# Patient Record
Sex: FEMALE | Race: WHITE | Employment: OTHER | URBAN - NONMETROPOLITAN AREA
[De-identification: names, ages, dates, MRNs, and addresses within clinical notes are randomized per-mention and may not be internally consistent; named-entity substitution may affect disease eponyms.]

---

## 2019-06-19 RX ORDER — PREDNISONE 1 MG/1
10 TABLET ORAL 2 TIMES DAILY
Status: ON HOLD | COMMUNITY
End: 2019-06-28 | Stop reason: SDUPTHER

## 2019-06-19 RX ORDER — CARVEDILOL 6.25 MG/1
6.25 TABLET ORAL 2 TIMES DAILY WITH MEALS
COMMUNITY

## 2019-06-19 RX ORDER — LOSARTAN POTASSIUM 50 MG/1
50 TABLET ORAL 2 TIMES DAILY
Status: ON HOLD | COMMUNITY
End: 2019-06-28 | Stop reason: SDUPTHER

## 2019-06-19 RX ORDER — TRAZODONE HYDROCHLORIDE 100 MG/1
200 TABLET ORAL NIGHTLY
COMMUNITY

## 2019-06-19 RX ORDER — HYDROCODONE BITARTRATE AND ACETAMINOPHEN 5; 325 MG/1; MG/1
1 TABLET ORAL EVERY 6 HOURS PRN
Status: ON HOLD | COMMUNITY
End: 2019-06-20 | Stop reason: HOSPADM

## 2019-06-19 RX ORDER — LEVOTHYROXINE SODIUM 0.07 MG/1
75 TABLET ORAL DAILY
Status: ON HOLD | COMMUNITY
End: 2019-06-28 | Stop reason: HOSPADM

## 2019-06-19 RX ORDER — POLYETHYLENE GLYCOL 3350 17 G/17G
17 POWDER, FOR SOLUTION ORAL DAILY PRN
COMMUNITY

## 2019-06-19 RX ORDER — OXYBUTYNIN CHLORIDE 15 MG/1
15 TABLET, EXTENDED RELEASE ORAL DAILY
COMMUNITY

## 2019-06-19 RX ORDER — NAPROXEN 500 MG/1
500 TABLET ORAL 2 TIMES DAILY WITH MEALS
Status: ON HOLD | COMMUNITY
End: 2019-06-28 | Stop reason: HOSPADM

## 2019-06-19 RX ORDER — ROPINIROLE 1 MG/1
1 TABLET, FILM COATED ORAL
COMMUNITY

## 2019-06-19 RX ORDER — ASPIRIN 81 MG/1
81 TABLET ORAL DAILY
COMMUNITY

## 2019-06-19 RX ORDER — CYCLOBENZAPRINE HCL 10 MG
20 TABLET ORAL NIGHTLY
COMMUNITY

## 2019-06-19 RX ORDER — PAROXETINE HYDROCHLORIDE 20 MG/1
20 TABLET, FILM COATED ORAL EVERY MORNING
COMMUNITY

## 2019-06-19 RX ORDER — ATORVASTATIN CALCIUM 80 MG/1
80 TABLET, FILM COATED ORAL NIGHTLY
COMMUNITY

## 2019-06-19 RX ORDER — CHLORAL HYDRATE 500 MG
1000 CAPSULE ORAL DAILY
COMMUNITY

## 2019-06-19 RX ORDER — PANTOPRAZOLE SODIUM 40 MG/1
40 TABLET, DELAYED RELEASE ORAL 2 TIMES DAILY
COMMUNITY

## 2019-06-19 RX ORDER — AMLODIPINE BESYLATE 5 MG/1
5 TABLET ORAL DAILY
Status: ON HOLD | COMMUNITY
End: 2019-06-28 | Stop reason: HOSPADM

## 2019-06-19 RX ORDER — DOCUSATE SODIUM 100 MG/1
100 CAPSULE, LIQUID FILLED ORAL 2 TIMES DAILY PRN
COMMUNITY

## 2019-06-19 NOTE — PROGRESS NOTES
NPO after midnight  [place] and drivers license  Wear comfortable clean clothing  Do not bring jewelry  Shower night before and morning of surgery with a liquid antibacterial soap  Bring  medications , over night bag, walker  Follow all instructions given by your physician   needed at discharge  Call [phone] for any questions    In preparation for their surgical procedure above patient was screened for Obstructive Sleep Apnea (MATEUSZ) using the STOP-Bang Questionnaire by the [place] [address] department. This is a pre-surgical screening tool for patient safety and serves as a recommendation, this WILL NOT cause cancellation of surgery. STOP-Bang Questionnaire  * Do you currently see a pulmonologist?  No     If yes STOP, do not complete. Patient follows with  .    1. Do you snore loudly (able to be heard in the next room)? No    2. Do you often feel tired or sleepy during the daytime? No       3. Has anyone ever told you that you stop breathing during your sleep? No    4. Do you have or are you being treated for high blood pressure? Yes      5. BMI more than 35? BMI (Calculated): 23.8        No    6. Age over 48 years? 68 y.o. Yes    7. Neck Circumference greater than 17 inches for male or 16 inches for female? Measured           (visits only)            Not Applicable    8. Gender Male? No      TOTAL SCORE: 2    MATEUSZ - Low Risk : Yes to 0 - 2 questions  MATEUSZ - Intermediate Risk : Yes to 3 - 4 questions  MATEUSZ - High Risk : Yes to 5 - 8 questions    Adapted from:   STOP Questionnaire: A Tool to Screen Patients for Obstructive Sleep Apnea   JER Red[facility]P.[facility]., MUMTAZ Mathews.B.B.S., Sonia Klinefelter, M.D., Chanelle Lerma. Karina Ruiz, Ph.D., MUMTAZ Regan.B.B.S., Mevelyn Sicard, M.Sc., Socorro Zarate M.D., Dara Ritchie. BRET Azul[facility]P.[facility]    Anesthesiology 2008; 982:696-21 Copyright 2008, the [address] of [employer].   ----------------------------------------------------------------------------------------------------------------

## 2019-06-20 ENCOUNTER — APPOINTMENT (OUTPATIENT)
Dept: GENERAL RADIOLOGY | Age: 69
DRG: 470 | End: 2019-06-20
Attending: ORTHOPAEDIC SURGERY
Payer: MEDICARE

## 2019-06-20 ENCOUNTER — ANESTHESIA EVENT (OUTPATIENT)
Dept: OPERATING ROOM | Age: 69
DRG: 470 | End: 2019-06-20
Payer: MEDICARE

## 2019-06-20 ENCOUNTER — HOSPITAL ENCOUNTER (INPATIENT)
Age: 69
LOS: 3 days | Discharge: HOME HEALTH | DRG: 470 | End: 2019-06-23
Attending: ORTHOPAEDIC SURGERY | Admitting: ORTHOPAEDIC SURGERY
Payer: MEDICARE

## 2019-06-20 ENCOUNTER — ANESTHESIA (OUTPATIENT)
Dept: OPERATING ROOM | Age: 69
DRG: 470 | End: 2019-06-20
Payer: MEDICARE

## 2019-06-20 VITALS
DIASTOLIC BLOOD PRESSURE: 57 MMHG | RESPIRATION RATE: 8 BRPM | TEMPERATURE: 97.9 F | OXYGEN SATURATION: 71 % | SYSTOLIC BLOOD PRESSURE: 118 MMHG

## 2019-06-20 DIAGNOSIS — M16.12 PRIMARY OSTEOARTHRITIS OF LEFT HIP: ICD-10-CM

## 2019-06-20 DIAGNOSIS — Z96.642 STATUS POST TOTAL REPLACEMENT OF LEFT HIP: Primary | ICD-10-CM

## 2019-06-20 LAB
ABO: NORMAL
ANTIBODY SCREEN: NORMAL
RH FACTOR: NORMAL

## 2019-06-20 PROCEDURE — 1200000000 HC SEMI PRIVATE

## 2019-06-20 PROCEDURE — 36415 COLL VENOUS BLD VENIPUNCTURE: CPT

## 2019-06-20 PROCEDURE — 6360000002 HC RX W HCPCS: Performed by: ORTHOPAEDIC SURGERY

## 2019-06-20 PROCEDURE — 2709999900 HC NON-CHARGEABLE SUPPLY: Performed by: ORTHOPAEDIC SURGERY

## 2019-06-20 PROCEDURE — 6360000002 HC RX W HCPCS: Performed by: ANESTHESIOLOGY

## 2019-06-20 PROCEDURE — 6360000002 HC RX W HCPCS: Performed by: REGISTERED NURSE

## 2019-06-20 PROCEDURE — 3700000000 HC ANESTHESIA ATTENDED CARE: Performed by: ORTHOPAEDIC SURGERY

## 2019-06-20 PROCEDURE — 0SRB0JZ REPLACEMENT OF LEFT HIP JOINT WITH SYNTHETIC SUBSTITUTE, OPEN APPROACH: ICD-10-PCS | Performed by: ORTHOPAEDIC SURGERY

## 2019-06-20 PROCEDURE — 6370000000 HC RX 637 (ALT 250 FOR IP): Performed by: NURSE PRACTITIONER

## 2019-06-20 PROCEDURE — 86850 RBC ANTIBODY SCREEN: CPT

## 2019-06-20 PROCEDURE — C1713 ANCHOR/SCREW BN/BN,TIS/BN: HCPCS | Performed by: ORTHOPAEDIC SURGERY

## 2019-06-20 PROCEDURE — 7100000011 HC PHASE II RECOVERY - ADDTL 15 MIN: Performed by: ORTHOPAEDIC SURGERY

## 2019-06-20 PROCEDURE — 7100000001 HC PACU RECOVERY - ADDTL 15 MIN: Performed by: ORTHOPAEDIC SURGERY

## 2019-06-20 PROCEDURE — 3600000005 HC SURGERY LEVEL 5 BASE: Performed by: ORTHOPAEDIC SURGERY

## 2019-06-20 PROCEDURE — 3700000001 HC ADD 15 MINUTES (ANESTHESIA): Performed by: ORTHOPAEDIC SURGERY

## 2019-06-20 PROCEDURE — 2500000003 HC RX 250 WO HCPCS: Performed by: ORTHOPAEDIC SURGERY

## 2019-06-20 PROCEDURE — 86900 BLOOD TYPING SEROLOGIC ABO: CPT

## 2019-06-20 PROCEDURE — 86901 BLOOD TYPING SEROLOGIC RH(D): CPT

## 2019-06-20 PROCEDURE — 7100000010 HC PHASE II RECOVERY - FIRST 15 MIN: Performed by: ORTHOPAEDIC SURGERY

## 2019-06-20 PROCEDURE — 99223 1ST HOSP IP/OBS HIGH 75: CPT | Performed by: PHYSICIAN ASSISTANT

## 2019-06-20 PROCEDURE — 6360000002 HC RX W HCPCS: Performed by: NURSE PRACTITIONER

## 2019-06-20 PROCEDURE — 2500000003 HC RX 250 WO HCPCS: Performed by: REGISTERED NURSE

## 2019-06-20 PROCEDURE — C1776 JOINT DEVICE (IMPLANTABLE): HCPCS | Performed by: ORTHOPAEDIC SURGERY

## 2019-06-20 PROCEDURE — 73502 X-RAY EXAM HIP UNI 2-3 VIEWS: CPT

## 2019-06-20 PROCEDURE — 7100000000 HC PACU RECOVERY - FIRST 15 MIN: Performed by: ORTHOPAEDIC SURGERY

## 2019-06-20 PROCEDURE — 3600000015 HC SURGERY LEVEL 5 ADDTL 15MIN: Performed by: ORTHOPAEDIC SURGERY

## 2019-06-20 PROCEDURE — 2580000003 HC RX 258: Performed by: NURSE PRACTITIONER

## 2019-06-20 PROCEDURE — 2580000003 HC RX 258: Performed by: ORTHOPAEDIC SURGERY

## 2019-06-20 PROCEDURE — 6360000002 HC RX W HCPCS

## 2019-06-20 DEVICE — HEAD FEM DIA36MM +8.5MM OFFSET 12/14 TAPR HIP MTL M-SPEC: Type: IMPLANTABLE DEVICE | Site: HIP | Status: FUNCTIONAL

## 2019-06-20 DEVICE — SCREW BNE L50MM DIA6.5MM CANC HIP DOME PINN: Type: IMPLANTABLE DEVICE | Site: HIP | Status: FUNCTIONAL

## 2019-06-20 DEVICE — SHELL ACET DIA52MM HIP PORCOAT LOK PRI SECT PRESSFIT PINN: Type: IMPLANTABLE DEVICE | Site: HIP | Status: FUNCTIONAL

## 2019-06-20 DEVICE — LINER ACET OD52MM ID36MM HIP ALTRX PINN: Type: IMPLANTABLE DEVICE | Site: HIP | Status: FUNCTIONAL

## 2019-06-20 RX ORDER — FENTANYL CITRATE 50 UG/ML
INJECTION, SOLUTION INTRAMUSCULAR; INTRAVENOUS
Status: COMPLETED
Start: 2019-06-20 | End: 2019-06-20

## 2019-06-20 RX ORDER — LEVOTHYROXINE SODIUM 0.07 MG/1
75 TABLET ORAL DAILY
Status: DISCONTINUED | OUTPATIENT
Start: 2019-06-20 | End: 2019-06-23 | Stop reason: HOSPADM

## 2019-06-20 RX ORDER — CELECOXIB 200 MG/1
200 CAPSULE ORAL ONCE
Status: CANCELLED | OUTPATIENT
Start: 2019-06-20 | End: 2019-06-20

## 2019-06-20 RX ORDER — HYDROCODONE BITARTRATE AND ACETAMINOPHEN 5; 325 MG/1; MG/1
1 TABLET ORAL EVERY 4 HOURS PRN
Status: DISCONTINUED | OUTPATIENT
Start: 2019-06-20 | End: 2019-06-23 | Stop reason: HOSPADM

## 2019-06-20 RX ORDER — HYDROCODONE BITARTRATE AND ACETAMINOPHEN 5; 325 MG/1; MG/1
2 TABLET ORAL EVERY 4 HOURS PRN
Status: DISCONTINUED | OUTPATIENT
Start: 2019-06-20 | End: 2019-06-23 | Stop reason: HOSPADM

## 2019-06-20 RX ORDER — PREDNISONE 1 MG/1
5 TABLET ORAL 2 TIMES DAILY
Status: DISCONTINUED | OUTPATIENT
Start: 2019-06-20 | End: 2019-06-23 | Stop reason: HOSPADM

## 2019-06-20 RX ORDER — OXYCODONE HCL 10 MG/1
10 TABLET, FILM COATED, EXTENDED RELEASE ORAL ONCE
Status: CANCELLED | OUTPATIENT
Start: 2019-06-20 | End: 2019-06-20

## 2019-06-20 RX ORDER — LIDOCAINE HYDROCHLORIDE 20 MG/ML
INJECTION, SOLUTION INTRAVENOUS PRN
Status: DISCONTINUED | OUTPATIENT
Start: 2019-06-20 | End: 2019-06-20 | Stop reason: SDUPTHER

## 2019-06-20 RX ORDER — PANTOPRAZOLE SODIUM 40 MG/1
40 TABLET, DELAYED RELEASE ORAL 2 TIMES DAILY
Status: DISCONTINUED | OUTPATIENT
Start: 2019-06-20 | End: 2019-06-23 | Stop reason: HOSPADM

## 2019-06-20 RX ORDER — KETOROLAC TROMETHAMINE 30 MG/ML
INJECTION, SOLUTION INTRAMUSCULAR; INTRAVENOUS PRN
Status: DISCONTINUED | OUTPATIENT
Start: 2019-06-20 | End: 2019-06-20 | Stop reason: SDUPTHER

## 2019-06-20 RX ORDER — ATORVASTATIN CALCIUM 80 MG/1
80 TABLET, FILM COATED ORAL NIGHTLY
Status: DISCONTINUED | OUTPATIENT
Start: 2019-06-20 | End: 2019-06-23 | Stop reason: HOSPADM

## 2019-06-20 RX ORDER — DIPHENHYDRAMINE HYDROCHLORIDE 50 MG/ML
INJECTION INTRAMUSCULAR; INTRAVENOUS PRN
Status: DISCONTINUED | OUTPATIENT
Start: 2019-06-20 | End: 2019-06-20 | Stop reason: SDUPTHER

## 2019-06-20 RX ORDER — TRANEXAMIC ACID 100 MG/ML
INJECTION, SOLUTION INTRAVENOUS PRN
Status: DISCONTINUED | OUTPATIENT
Start: 2019-06-20 | End: 2019-06-20 | Stop reason: ALTCHOICE

## 2019-06-20 RX ORDER — SODIUM CHLORIDE 0.9 % (FLUSH) 0.9 %
10 SYRINGE (ML) INJECTION PRN
Status: DISCONTINUED | OUTPATIENT
Start: 2019-06-20 | End: 2019-06-20 | Stop reason: HOSPADM

## 2019-06-20 RX ORDER — DEXAMETHASONE SODIUM PHOSPHATE 4 MG/ML
INJECTION, SOLUTION INTRA-ARTICULAR; INTRALESIONAL; INTRAMUSCULAR; INTRAVENOUS; SOFT TISSUE PRN
Status: DISCONTINUED | OUTPATIENT
Start: 2019-06-20 | End: 2019-06-20 | Stop reason: SDUPTHER

## 2019-06-20 RX ORDER — FENTANYL CITRATE 50 UG/ML
25 INJECTION, SOLUTION INTRAMUSCULAR; INTRAVENOUS ONCE
Status: COMPLETED | OUTPATIENT
Start: 2019-06-20 | End: 2019-06-20

## 2019-06-20 RX ORDER — ACETAMINOPHEN 325 MG/1
650 TABLET ORAL EVERY 4 HOURS PRN
Status: DISCONTINUED | OUTPATIENT
Start: 2019-06-20 | End: 2019-06-23 | Stop reason: HOSPADM

## 2019-06-20 RX ORDER — SODIUM CHLORIDE 0.9 % (FLUSH) 0.9 %
10 SYRINGE (ML) INJECTION EVERY 12 HOURS SCHEDULED
Status: DISCONTINUED | OUTPATIENT
Start: 2019-06-20 | End: 2019-06-20 | Stop reason: HOSPADM

## 2019-06-20 RX ORDER — TRAZODONE HYDROCHLORIDE 100 MG/1
200 TABLET ORAL NIGHTLY
Status: DISCONTINUED | OUTPATIENT
Start: 2019-06-20 | End: 2019-06-23 | Stop reason: HOSPADM

## 2019-06-20 RX ORDER — HYDROCODONE BITARTRATE AND ACETAMINOPHEN 5; 325 MG/1; MG/1
1-2 TABLET ORAL
Qty: 50 TABLET | Refills: 0 | Status: ON HOLD | OUTPATIENT
Start: 2019-06-20 | End: 2019-06-28

## 2019-06-20 RX ORDER — LOSARTAN POTASSIUM 50 MG/1
50 TABLET ORAL 2 TIMES DAILY
Status: DISCONTINUED | OUTPATIENT
Start: 2019-06-20 | End: 2019-06-23 | Stop reason: HOSPADM

## 2019-06-20 RX ORDER — POLYETHYLENE GLYCOL 3350 17 G/17G
17 POWDER, FOR SOLUTION ORAL DAILY PRN
Status: DISCONTINUED | OUTPATIENT
Start: 2019-06-20 | End: 2019-06-23 | Stop reason: HOSPADM

## 2019-06-20 RX ORDER — ROPINIROLE 1 MG/1
2 TABLET, FILM COATED ORAL NIGHTLY
Status: DISCONTINUED | OUTPATIENT
Start: 2019-06-20 | End: 2019-06-23 | Stop reason: HOSPADM

## 2019-06-20 RX ORDER — PAROXETINE HYDROCHLORIDE 20 MG/1
20 TABLET, FILM COATED ORAL EVERY MORNING
Status: DISCONTINUED | OUTPATIENT
Start: 2019-06-21 | End: 2019-06-23 | Stop reason: HOSPADM

## 2019-06-20 RX ORDER — ACETAMINOPHEN 500 MG
1000 TABLET ORAL EVERY 6 HOURS
Status: CANCELLED | OUTPATIENT
Start: 2019-06-20 | End: 2019-06-21

## 2019-06-20 RX ORDER — ROCURONIUM BROMIDE 10 MG/ML
INJECTION, SOLUTION INTRAVENOUS PRN
Status: DISCONTINUED | OUTPATIENT
Start: 2019-06-20 | End: 2019-06-20 | Stop reason: SDUPTHER

## 2019-06-20 RX ORDER — MORPHINE SULFATE 2 MG/ML
2 INJECTION, SOLUTION INTRAMUSCULAR; INTRAVENOUS
Status: DISCONTINUED | OUTPATIENT
Start: 2019-06-20 | End: 2019-06-23 | Stop reason: HOSPADM

## 2019-06-20 RX ORDER — FENTANYL CITRATE 50 UG/ML
INJECTION, SOLUTION INTRAMUSCULAR; INTRAVENOUS PRN
Status: DISCONTINUED | OUTPATIENT
Start: 2019-06-20 | End: 2019-06-20 | Stop reason: SDUPTHER

## 2019-06-20 RX ORDER — CARVEDILOL 6.25 MG/1
6.25 TABLET ORAL 2 TIMES DAILY WITH MEALS
Status: DISCONTINUED | OUTPATIENT
Start: 2019-06-20 | End: 2019-06-23 | Stop reason: HOSPADM

## 2019-06-20 RX ORDER — MORPHINE SULFATE 2 MG/ML
4 INJECTION, SOLUTION INTRAMUSCULAR; INTRAVENOUS
Status: DISCONTINUED | OUTPATIENT
Start: 2019-06-20 | End: 2019-06-23 | Stop reason: HOSPADM

## 2019-06-20 RX ORDER — CEFAZOLIN SODIUM 1 G/50ML
1 INJECTION, SOLUTION INTRAVENOUS EVERY 8 HOURS
Status: COMPLETED | OUTPATIENT
Start: 2019-06-20 | End: 2019-06-21

## 2019-06-20 RX ORDER — ONDANSETRON 2 MG/ML
4 INJECTION INTRAMUSCULAR; INTRAVENOUS EVERY 6 HOURS PRN
Status: DISCONTINUED | OUTPATIENT
Start: 2019-06-20 | End: 2019-06-23 | Stop reason: HOSPADM

## 2019-06-20 RX ORDER — DOCUSATE SODIUM 100 MG/1
100 CAPSULE, LIQUID FILLED ORAL DAILY
Status: DISCONTINUED | OUTPATIENT
Start: 2019-06-20 | End: 2019-06-23 | Stop reason: HOSPADM

## 2019-06-20 RX ORDER — KETOROLAC TROMETHAMINE 30 MG/ML
15 INJECTION, SOLUTION INTRAMUSCULAR; INTRAVENOUS EVERY 6 HOURS
Status: DISPENSED | OUTPATIENT
Start: 2019-06-20 | End: 2019-06-22

## 2019-06-20 RX ORDER — SODIUM CHLORIDE 9 MG/ML
INJECTION, SOLUTION INTRAVENOUS CONTINUOUS
Status: DISCONTINUED | OUTPATIENT
Start: 2019-06-20 | End: 2019-06-23 | Stop reason: HOSPADM

## 2019-06-20 RX ORDER — SODIUM CHLORIDE 9 MG/ML
INJECTION, SOLUTION INTRAVENOUS CONTINUOUS
Status: DISCONTINUED | OUTPATIENT
Start: 2019-06-20 | End: 2019-06-20

## 2019-06-20 RX ORDER — OMEGA-3-ACID ETHYL ESTERS 1 G/1
1000 CAPSULE, LIQUID FILLED ORAL DAILY
Status: DISCONTINUED | OUTPATIENT
Start: 2019-06-21 | End: 2019-06-23 | Stop reason: HOSPADM

## 2019-06-20 RX ORDER — PROPOFOL 10 MG/ML
INJECTION, EMULSION INTRAVENOUS PRN
Status: DISCONTINUED | OUTPATIENT
Start: 2019-06-20 | End: 2019-06-20 | Stop reason: SDUPTHER

## 2019-06-20 RX ORDER — AMLODIPINE BESYLATE 5 MG/1
5 TABLET ORAL DAILY
Status: DISCONTINUED | OUTPATIENT
Start: 2019-06-21 | End: 2019-06-23 | Stop reason: HOSPADM

## 2019-06-20 RX ORDER — ONDANSETRON 2 MG/ML
INJECTION INTRAMUSCULAR; INTRAVENOUS PRN
Status: DISCONTINUED | OUTPATIENT
Start: 2019-06-20 | End: 2019-06-20 | Stop reason: SDUPTHER

## 2019-06-20 RX ORDER — CYCLOBENZAPRINE HCL 10 MG
10 TABLET ORAL 3 TIMES DAILY PRN
Status: DISCONTINUED | OUTPATIENT
Start: 2019-06-20 | End: 2019-06-23 | Stop reason: HOSPADM

## 2019-06-20 RX ORDER — GABAPENTIN 600 MG/1
600 TABLET ORAL ONCE
Status: CANCELLED | OUTPATIENT
Start: 2019-06-20 | End: 2019-06-20

## 2019-06-20 RX ADMIN — SUGAMMADEX 200 MG: 100 INJECTION, SOLUTION INTRAVENOUS at 10:07

## 2019-06-20 RX ADMIN — HYDROMORPHONE HYDROCHLORIDE 0.5 MG: 1 INJECTION, SOLUTION INTRAMUSCULAR; INTRAVENOUS; SUBCUTANEOUS at 10:30

## 2019-06-20 RX ADMIN — HYDROCODONE BITARTRATE AND ACETAMINOPHEN 2 TABLET: 5; 325 TABLET ORAL at 22:10

## 2019-06-20 RX ADMIN — RIVAROXABAN 10 MG: 10 TABLET, FILM COATED ORAL at 18:58

## 2019-06-20 RX ADMIN — FENTANYL CITRATE 25 MCG: 50 INJECTION, SOLUTION INTRAMUSCULAR; INTRAVENOUS at 10:35

## 2019-06-20 RX ADMIN — KETOROLAC TROMETHAMINE 15 MG: 30 INJECTION, SOLUTION INTRAMUSCULAR at 18:58

## 2019-06-20 RX ADMIN — DEXAMETHASONE SODIUM PHOSPHATE 4 MG: 4 INJECTION, SOLUTION INTRAMUSCULAR; INTRAVENOUS at 09:21

## 2019-06-20 RX ADMIN — ONDANSETRON HYDROCHLORIDE 4 MG: 4 INJECTION, SOLUTION INTRAMUSCULAR; INTRAVENOUS at 09:21

## 2019-06-20 RX ADMIN — ROCURONIUM BROMIDE 10 MG: 10 INJECTION INTRAVENOUS at 09:26

## 2019-06-20 RX ADMIN — SODIUM CHLORIDE: 9 INJECTION, SOLUTION INTRAVENOUS at 20:26

## 2019-06-20 RX ADMIN — CYCLOBENZAPRINE HYDROCHLORIDE 10 MG: 10 TABLET, FILM COATED ORAL at 23:11

## 2019-06-20 RX ADMIN — ROCURONIUM BROMIDE 40 MG: 10 INJECTION INTRAVENOUS at 09:18

## 2019-06-20 RX ADMIN — PROPOFOL 120 MG: 10 INJECTION, EMULSION INTRAVENOUS at 09:18

## 2019-06-20 RX ADMIN — FENTANYL CITRATE 25 MCG: 50 INJECTION INTRAMUSCULAR; INTRAVENOUS at 11:00

## 2019-06-20 RX ADMIN — HYDROMORPHONE HYDROCHLORIDE 0.5 MG: 1 INJECTION, SOLUTION INTRAMUSCULAR; INTRAVENOUS; SUBCUTANEOUS at 10:45

## 2019-06-20 RX ADMIN — DOCUSATE SODIUM 100 MG: 100 CAPSULE, LIQUID FILLED ORAL at 18:58

## 2019-06-20 RX ADMIN — CEFAZOLIN SODIUM 1 G: 1 INJECTION, SOLUTION INTRAVENOUS at 18:58

## 2019-06-20 RX ADMIN — TRAZODONE HYDROCHLORIDE 200 MG: 100 TABLET ORAL at 23:11

## 2019-06-20 RX ADMIN — PREDNISONE 5 MG: 5 TABLET ORAL at 18:58

## 2019-06-20 RX ADMIN — ROCURONIUM BROMIDE 20 MG: 10 INJECTION INTRAVENOUS at 09:33

## 2019-06-20 RX ADMIN — FENTANYL CITRATE 25 MCG: 50 INJECTION INTRAMUSCULAR; INTRAVENOUS at 10:35

## 2019-06-20 RX ADMIN — ROPINIROLE HYDROCHLORIDE 2 MG: 1 TABLET, FILM COATED ORAL at 20:36

## 2019-06-20 RX ADMIN — FENTANYL CITRATE 100 MCG: 50 INJECTION INTRAMUSCULAR; INTRAVENOUS at 09:18

## 2019-06-20 RX ADMIN — LEVOTHYROXINE SODIUM 75 MCG: 75 TABLET ORAL at 18:58

## 2019-06-20 RX ADMIN — OXYBUTYNIN CHLORIDE 15 MG: 10 TABLET, EXTENDED RELEASE ORAL at 18:58

## 2019-06-20 RX ADMIN — LOSARTAN POTASSIUM 50 MG: 50 TABLET, FILM COATED ORAL at 20:36

## 2019-06-20 RX ADMIN — LIDOCAINE HYDROCHLORIDE 60 MG: 20 INJECTION, SOLUTION INTRAVENOUS at 09:18

## 2019-06-20 RX ADMIN — ATORVASTATIN CALCIUM 80 MG: 80 TABLET, FILM COATED ORAL at 20:36

## 2019-06-20 RX ADMIN — KETOROLAC TROMETHAMINE 15 MG: 30 INJECTION, SOLUTION INTRAMUSCULAR; INTRAVENOUS at 10:01

## 2019-06-20 RX ADMIN — PANTOPRAZOLE SODIUM 40 MG: 40 TABLET, DELAYED RELEASE ORAL at 20:36

## 2019-06-20 RX ADMIN — CARVEDILOL 6.25 MG: 6.25 TABLET, FILM COATED ORAL at 18:58

## 2019-06-20 RX ADMIN — SODIUM CHLORIDE: 9 INJECTION, SOLUTION INTRAVENOUS at 08:36

## 2019-06-20 RX ADMIN — Medication 2 G: at 09:21

## 2019-06-20 RX ADMIN — DIPHENHYDRAMINE HYDROCHLORIDE 12.5 MG: 50 INJECTION, SOLUTION INTRAMUSCULAR; INTRAVENOUS at 09:21

## 2019-06-20 ASSESSMENT — PULMONARY FUNCTION TESTS
PIF_VALUE: 19
PIF_VALUE: 9
PIF_VALUE: 20
PIF_VALUE: 20
PIF_VALUE: 3
PIF_VALUE: 19
PIF_VALUE: 21
PIF_VALUE: 19
PIF_VALUE: 19
PIF_VALUE: 6
PIF_VALUE: 20
PIF_VALUE: 20
PIF_VALUE: 21
PIF_VALUE: 3
PIF_VALUE: 21
PIF_VALUE: 19
PIF_VALUE: 19
PIF_VALUE: 21
PIF_VALUE: 22
PIF_VALUE: 21
PIF_VALUE: 20
PIF_VALUE: 19
PIF_VALUE: 20
PIF_VALUE: 19
PIF_VALUE: 21
PIF_VALUE: 20
PIF_VALUE: 1
PIF_VALUE: 2
PIF_VALUE: 19
PIF_VALUE: 19
PIF_VALUE: 20
PIF_VALUE: 19
PIF_VALUE: 21
PIF_VALUE: 20
PIF_VALUE: 21
PIF_VALUE: 20
PIF_VALUE: 18
PIF_VALUE: 20
PIF_VALUE: 19
PIF_VALUE: 1
PIF_VALUE: 20
PIF_VALUE: 21
PIF_VALUE: 21
PIF_VALUE: 2
PIF_VALUE: 28
PIF_VALUE: 19
PIF_VALUE: 1
PIF_VALUE: 19
PIF_VALUE: 9
PIF_VALUE: 1
PIF_VALUE: 0
PIF_VALUE: 20
PIF_VALUE: 20
PIF_VALUE: 13
PIF_VALUE: 22
PIF_VALUE: 19
PIF_VALUE: 19
PIF_VALUE: 3

## 2019-06-20 ASSESSMENT — PAIN DESCRIPTION - LOCATION
LOCATION: HIP

## 2019-06-20 ASSESSMENT — PAIN DESCRIPTION - ORIENTATION
ORIENTATION: LEFT

## 2019-06-20 ASSESSMENT — PAIN SCALES - GENERAL
PAINLEVEL_OUTOF10: 8
PAINLEVEL_OUTOF10: 7
PAINLEVEL_OUTOF10: 2
PAINLEVEL_OUTOF10: 8
PAINLEVEL_OUTOF10: 4
PAINLEVEL_OUTOF10: 4
PAINLEVEL_OUTOF10: 6
PAINLEVEL_OUTOF10: 10
PAINLEVEL_OUTOF10: 3
PAINLEVEL_OUTOF10: 2
PAINLEVEL_OUTOF10: 4
PAINLEVEL_OUTOF10: 10
PAINLEVEL_OUTOF10: 10
PAINLEVEL_OUTOF10: 5

## 2019-06-20 ASSESSMENT — PAIN DESCRIPTION - PAIN TYPE
TYPE: ACUTE PAIN
TYPE: SURGICAL PAIN
TYPE: ACUTE PAIN
TYPE: SURGICAL PAIN

## 2019-06-20 ASSESSMENT — PAIN DESCRIPTION - DESCRIPTORS
DESCRIPTORS: ACHING
DESCRIPTORS: ACHING
DESCRIPTORS: SORE
DESCRIPTORS: ACHING

## 2019-06-20 ASSESSMENT — PAIN DESCRIPTION - ONSET
ONSET: ON-GOING
ONSET: ON-GOING

## 2019-06-20 ASSESSMENT — PAIN DESCRIPTION - PROGRESSION
CLINICAL_PROGRESSION: GRADUALLY IMPROVING
CLINICAL_PROGRESSION: NOT CHANGED
CLINICAL_PROGRESSION: GRADUALLY IMPROVING
CLINICAL_PROGRESSION: NOT CHANGED
CLINICAL_PROGRESSION: NOT CHANGED

## 2019-06-20 ASSESSMENT — PAIN DESCRIPTION - FREQUENCY
FREQUENCY: INTERMITTENT
FREQUENCY: CONTINUOUS
FREQUENCY: CONTINUOUS

## 2019-06-20 ASSESSMENT — PAIN - FUNCTIONAL ASSESSMENT: PAIN_FUNCTIONAL_ASSESSMENT: PREVENTS OR INTERFERES WITH MANY ACTIVE NOT PASSIVE ACTIVITIES

## 2019-06-20 NOTE — OP NOTE
Orthopaedic [facility]  Post-Operative Note    Patient Name:  Giovanna Jackman  MRN:  [id number] YOB: 1950  Admission Date:  6/20/2019    Surgery Date:  6/20/2019    Pre-operative Diagnosis: Osteoarthritis Left  Hip    Post-operative Diagnosis: Same    Procedure: Total hip arthroplasty (41442)    Surgeon: Caren Burkett MD    Assistants:   Cheyenne Juarez      Anesthesia: General    Estimated Blood Loss:  less than 618 ml    Complications:  None    Specimens: 0    Implants: Depuy Actis Stem size 6    Cup size 52, Head size 36+8.5         INDICATION FOR PROCEDURE     The patient is a 68y.o.-year-old with Left  hip pain for quite some time. The patient has tried activity modification, pain medication without relief. Occasionally assistive devices such as rails are required to mobilize. NSAIDS have not been satisfactory. On physical exam there is limited ROM, pain, and crepitus. X-rays demonstrate bone on bone arthritis. . Discussed the option of doing a total hip replacement and we have elected to proceed     PROCEDURE DETAILS     The patient was taken to the operating room, underwent a general anesthetic, placed in lateral position Left  side up. Care was taken to padall bony prominences. Timeout was taken, consent was confirmed. The patient received Ancef 2 grams within 30 minutes of incision. Started with a lateral approach to the hip with skin knife followed by electrocautery down to the iliotibial band. The iliotibial band was then split. Charnley C retractor was put in position and took down the anterior third of the gluteus medius tendon. Placed the leg in a figure-of-four, made a cut about a fingerbreadth above the lesser trochanter. We removed the remaining head and neck. Placed Hohmann's around the acetabulum, cleaned soft tissue from the acetabulum deepened the acetabulum Reamed up sequentially to size 52.  Implanted size 52 cup at 45 degrees abduction, 20 degrees of anteversion. We placed one 50 mm screw between the tables of the pelvis. Nice fixation was obtained. Standard 36 mm liner. Removed a few posterior osteophytes. We then used the  followed by the canal finder. We lateralized, reamed a bit,  broached to to size 6 stem. We implanted the same size stem. We trialed head and neck lengths, elected to go with a +8.5 neck length. It was stable throughout all range of motion. Limb lengths appear to be appropriate. Wounds were thoroughly irrigated. Repaired the abductor and capsule back with #5 Ethibond through bone tunnels. The capsule with injected with Duromorph, Toradol, marcaine with epi, and tranexamic acid. The iliotibial band was repaired with #2 Jacqualin Able. Skin was repaired with 0 Quill, Prineo, and dry dressings. The patient was then awakened and returned to the recovery room in fair condition. POSTOPERATIVE PLAN: Weightbearing as tolerated, total hip arthroplasty protocol. First postop visit will be a clinical exam, no x-rays in 8 weeks. The physician assistant assisted throughout the procedure with positioning, draping, retraction, wound closure, and dressings.     Regina Land MD

## 2019-06-20 NOTE — PROGRESS NOTES
Called to patient's room in Same Day Surgery for evaluation. Patient has bruising on bilateral ventrolateral surface with some visible bruising on the dorsal surface as well. No swelling noted. Patent denies pain or other abnormal sensation. States that she was actually unaware of anything abnormal until a family noticed the discoloration. Intubation was atraumatic. There was no observed mechanism of trauma during the operative course. Advised patient that if symptoms worsen to notify us, but otherwise no intervention recommended at this time. Patent states understanding.

## 2019-06-20 NOTE — FLOWSHEET NOTE
Pt admitted to [facility] room 17. ID, fall and Allergy band applied. Family at the bedside. Call light in reach.

## 2019-06-20 NOTE — ANESTHESIA POSTPROCEDURE EVALUATION
Department of Anesthesiology  Postprocedure Note    Patient: China Courtney  MRN: [id number]  YOB: 1950  Date of evaluation: 6/20/2019  Time:  12:03 PM     Procedure Summary     Date:  06/20/19 Room / Location:  [address] [place]    Anesthesia Start:  0913 Anesthesia Stop:  1020    Procedure:  LEFT TOTAL HIP REPLACEMENT (Left Hip) Diagnosis:  (LEFT PIP OSTEOARTHRITIS)    Surgeon:  Mike Rose MD Responsible Provider:  Bora Crawford DO    Anesthesia Type:  general ASA Status:  3          Anesthesia Type: general    Mona Phase I: Mona Score: 8    Mona Phase II:      Last vitals: Reviewed and per EMR flowsheets.        Anesthesia Post Evaluation    Patient location during evaluation: PACU  Patient participation: complete - patient participated  Level of consciousness: awake  Airway patency: patent  Nausea & Vomiting: no nausea and no vomiting  Complications: no  Cardiovascular status: hemodynamically stable  Respiratory status: acceptable  Hydration status: stable

## 2019-06-20 NOTE — H&P
History and Physical Update    Pt Name: Dora Parker  MRN: [id number]  YOB: 1950  Date of evaluation: 6/20/2019    [x] I have examined the patient and reviewed the H&P/Consult and there are no changes to the patient or plans.     [] I have examined the patient and reviewed the H&P/Consult and have noted the following changes:        CINDY Gaitan CNP   Electronically signed 6/20/2019 at 0992

## 2019-06-20 NOTE — ANESTHESIA PRE PROCEDURE
Department of Anesthesiology  Preprocedure Note       Name:  Tom Gramajo   Age:  68 y.o.  :  1950                                          MRN:  [id number]         Date:  2019      Surgeon: Cedrick Glass):  Ash Belle MD    Procedure: LEFT TOTAL HIP REPLACEMENT (Left Hip)    Medications prior to admission:   Prior to Admission medications    Medication Sig Start Date End Date Taking?  Authorizing Provider   amLODIPine (NORVASC) 5 MG tablet Take 5 mg by mouth daily   Yes Historical Provider, MD   aspirin 81 MG EC tablet Take 81 mg by mouth daily   Yes Historical Provider, MD   atorvastatin (LIPITOR) 80 MG tablet Take 80 mg by mouth nightly   Yes Historical Provider, MD   Calcium Carbonate-Vit D-Min (CALCIUM 600+D3 PLUS MINERALS PO) Take 1 tablet by mouth daily   Yes Historical Provider, MD   carvedilol (COREG) 6.25 MG tablet Take 6.25 mg by mouth 2 times daily (with meals)   Yes Historical Provider, MD   cyclobenzaprine (FLEXERIL) 10 MG tablet Take 20 mg by mouth nightly Takes 2 tabs at bedtime   Yes Historical Provider, MD   docusate sodium (COLACE) 100 MG capsule Take 100 mg by mouth 2 times daily as needed for Constipation   Yes Historical Provider, MD   Omega-3 Fatty Acids (FISH OIL) 1000 MG CAPS Take 1,000 mg by mouth daily   Yes Historical Provider, MD   levothyroxine (SYNTHROID) 75 MCG tablet Take 75 mcg by mouth Daily   Yes Historical Provider, MD   linaCLOtide (LINZESS) 72 MCG CAPS capsule Take 72 mcg by mouth every morning (before breakfast)   Yes Historical Provider, MD   losartan (COZAAR) 50 MG tablet Take 50 mg by mouth 2 times daily   Yes Historical Provider, MD   polyethylene glycol (GLYCOLAX) packet Take 17 g by mouth daily as needed for Constipation   Yes Historical Provider, MD   Multiple Vitamins-Minerals (MULTIVITAMIN PO) Take 1 tablet by mouth daily   Yes Historical Provider, MD   naproxen (NAPROSYN) 500 MG tablet Take 500 mg by mouth 2 times daily (with meals)   Yes Historical SURGERY  2004    cabg x1    CARPAL TUNNEL RELEASE Right      SECTION      x2    CHOLECYSTECTOMY      EYE SURGERY Bilateral 2015    cataracts    JOINT REPLACEMENT Right 2013    hip       Social History:    Social History     Tobacco Use    Smoking status: Former Smoker     Types: Cigarettes     Last attempt to quit: 2004     Years since quitting: 15.4    Smokeless tobacco: Never Used   Substance Use Topics    Alcohol use: Not on file     Comment: occ                                Counseling given: Not Answered      Vital Signs (Current):   Vitals:    19 0916 19 0815   BP:  128/74   Pulse:  95   Resp:  18   Temp:  98.7 °F (37.1 °C)   TempSrc:  Temporal   SpO2:  92%   Weight: 130 lb (59 kg) 130 lb 8.2 oz (59.2 kg)   Height: 5' 2\" (1.575 m) 5' 2\" (1.575 m)                                              BP Readings from Last 3 Encounters:   19 128/74       NPO Status: Time of last liquid consumption: 2300                        Time of last solid consumption: 2300                        Date of last liquid consumption: 19                        Date of last solid food consumption: 19    BMI:   Wt Readings from Last 3 Encounters:   19 130 lb 8.2 oz (59.2 kg)     Body mass index is 23.87 kg/m². CBC: No results found for: WBC, RBC, HGB, HCT, MCV, RDW, PLT    CMP: No results found for: NA, K, CL, CO2, BUN, CREATININE, GFRAA, AGRATIO, LABGLOM, GLUCOSE, PROT, CALCIUM, BILITOT, ALKPHOS, AST, ALT    POC Tests: No results for input(s): POCGLU, POCNA, POCK, POCCL, POCBUN, POCHEMO, POCHCT in the last 72 hours.     Coags: No results found for: PROTIME, INR, APTT    HCG (If Applicable): No results found for: PREGTESTUR, PREGSERUM, HCG, HCGQUANT     ABGs: No results found for: PHART, PO2ART, OAN3PIR, BHX6JDF, BEART, D1LKGZNW     Type & Screen (If Applicable):  No results found for: LABABO, [address]    Anesthesia Evaluation  Patient summary reviewed and Nursing notes reviewed no history of anesthetic complications:   Airway: Mallampati: II  TM distance: >3 FB   Neck ROM: full  Mouth opening: > = 3 FB Dental:          Pulmonary: breath sounds clear to auscultation                             Cardiovascular:  Exercise tolerance: poor (<4 METS),   (+) hypertension:, CAD:, CABG/stent:,       ECG reviewed  Rhythm: regular  Rate: normal    Stress test reviewed                Neuro/Psych:               GI/Hepatic/Renal:             Endo/Other:                     Abdominal:       Abdomen: soft. Vascular:                                        Anesthesia Plan      general     ASA 3       Induction: intravenous. MIPS: Postoperative opioids intended. Anesthetic plan and risks discussed with patient. Plan discussed with CRNA.                   [facility],    6/20/2019

## 2019-06-21 PROBLEM — I10 ESSENTIAL HYPERTENSION: Status: ACTIVE | Noted: 2019-06-21

## 2019-06-21 LAB
ANION GAP SERPL CALCULATED.3IONS-SCNC: 13 MEQ/L (ref 8–16)
BASOPHILS # BLD: 0.2 %
BASOPHILS ABSOLUTE: 0 THOU/MM3 (ref 0–0.1)
BUN BLDV-MCNC: 16 MG/DL (ref 7–22)
CALCIUM SERPL-MCNC: 8.6 MG/DL (ref 8.5–10.5)
CHLORIDE BLD-SCNC: 107 MEQ/L (ref 98–111)
CO2: 22 MEQ/L (ref 23–33)
CREAT SERPL-MCNC: 0.9 MG/DL (ref 0.4–1.2)
EOSINOPHIL # BLD: 0.2 %
EOSINOPHILS ABSOLUTE: 0 THOU/MM3 (ref 0–0.4)
ERYTHROCYTE [DISTWIDTH] IN BLOOD BY AUTOMATED COUNT: 14.3 % (ref 11.5–14.5)
ERYTHROCYTE [DISTWIDTH] IN BLOOD BY AUTOMATED COUNT: 56.3 FL (ref 35–45)
GFR SERPL CREATININE-BSD FRML MDRD: 62 ML/MIN/1.73M2
GLUCOSE BLD-MCNC: 119 MG/DL (ref 70–108)
HCT VFR BLD CALC: 31 % (ref 37–47)
HEMOGLOBIN: 9.5 GM/DL (ref 12–16)
IMMATURE GRANS (ABS): 0.14 THOU/MM3 (ref 0–0.07)
IMMATURE GRANULOCYTES: 0.9 %
LYMPHOCYTES # BLD: 12.7 %
LYMPHOCYTES ABSOLUTE: 2.1 THOU/MM3 (ref 1–4.8)
MCH RBC QN AUTO: 32.6 PG (ref 26–33)
MCHC RBC AUTO-ENTMCNC: 30.6 GM/DL (ref 32.2–35.5)
MCV RBC AUTO: 106.5 FL (ref 81–99)
MONOCYTES # BLD: 8.8 %
MONOCYTES ABSOLUTE: 1.4 THOU/MM3 (ref 0.4–1.3)
NUCLEATED RED BLOOD CELLS: 0 /100 WBC
PLATELET # BLD: 313 THOU/MM3 (ref 130–400)
PMV BLD AUTO: 8.9 FL (ref 9.4–12.4)
POTASSIUM SERPL-SCNC: 4.1 MEQ/L (ref 3.5–5.2)
RBC # BLD: 2.91 MILL/MM3 (ref 4.2–5.4)
SEG NEUTROPHILS: 77.2 %
SEGMENTED NEUTROPHILS ABSOLUTE COUNT: 12.6 THOU/MM3 (ref 1.8–7.7)
SODIUM BLD-SCNC: 142 MEQ/L (ref 135–145)
WBC # BLD: 16.3 THOU/MM3 (ref 4.8–10.8)

## 2019-06-21 PROCEDURE — 97166 OT EVAL MOD COMPLEX 45 MIN: CPT

## 2019-06-21 PROCEDURE — 97530 THERAPEUTIC ACTIVITIES: CPT

## 2019-06-21 PROCEDURE — 97110 THERAPEUTIC EXERCISES: CPT

## 2019-06-21 PROCEDURE — 80048 BASIC METABOLIC PNL TOTAL CA: CPT

## 2019-06-21 PROCEDURE — 97116 GAIT TRAINING THERAPY: CPT

## 2019-06-21 PROCEDURE — 6370000000 HC RX 637 (ALT 250 FOR IP): Performed by: NURSE PRACTITIONER

## 2019-06-21 PROCEDURE — 6360000002 HC RX W HCPCS: Performed by: NURSE PRACTITIONER

## 2019-06-21 PROCEDURE — 1200000000 HC SEMI PRIVATE

## 2019-06-21 PROCEDURE — 99232 SBSQ HOSP IP/OBS MODERATE 35: CPT | Performed by: INTERNAL MEDICINE

## 2019-06-21 PROCEDURE — 2580000003 HC RX 258: Performed by: NURSE PRACTITIONER

## 2019-06-21 PROCEDURE — 85025 COMPLETE CBC W/AUTO DIFF WBC: CPT

## 2019-06-21 PROCEDURE — 97535 SELF CARE MNGMENT TRAINING: CPT

## 2019-06-21 PROCEDURE — 97162 PT EVAL MOD COMPLEX 30 MIN: CPT

## 2019-06-21 PROCEDURE — 36415 COLL VENOUS BLD VENIPUNCTURE: CPT

## 2019-06-21 RX ADMIN — OXYBUTYNIN CHLORIDE 15 MG: 10 TABLET, EXTENDED RELEASE ORAL at 09:22

## 2019-06-21 RX ADMIN — LOSARTAN POTASSIUM 50 MG: 50 TABLET, FILM COATED ORAL at 09:22

## 2019-06-21 RX ADMIN — ROPINIROLE HYDROCHLORIDE 2 MG: 1 TABLET, FILM COATED ORAL at 21:00

## 2019-06-21 RX ADMIN — MORPHINE SULFATE 4 MG: 2 INJECTION, SOLUTION INTRAMUSCULAR; INTRAVENOUS at 20:32

## 2019-06-21 RX ADMIN — KETOROLAC TROMETHAMINE 15 MG: 30 INJECTION, SOLUTION INTRAMUSCULAR at 12:57

## 2019-06-21 RX ADMIN — RIVAROXABAN 10 MG: 10 TABLET, FILM COATED ORAL at 17:54

## 2019-06-21 RX ADMIN — OMEGA-3-ACID ETHYL ESTERS 1000 MG: 1 CAPSULE, LIQUID FILLED ORAL at 09:22

## 2019-06-21 RX ADMIN — TRAZODONE HYDROCHLORIDE 200 MG: 100 TABLET ORAL at 21:00

## 2019-06-21 RX ADMIN — SODIUM CHLORIDE: 9 INJECTION, SOLUTION INTRAVENOUS at 15:46

## 2019-06-21 RX ADMIN — CEFAZOLIN SODIUM 1 G: 1 INJECTION, SOLUTION INTRAVENOUS at 03:23

## 2019-06-21 RX ADMIN — LEVOTHYROXINE SODIUM 75 MCG: 75 TABLET ORAL at 06:13

## 2019-06-21 RX ADMIN — HYDROCODONE BITARTRATE AND ACETAMINOPHEN 2 TABLET: 5; 325 TABLET ORAL at 11:20

## 2019-06-21 RX ADMIN — KETOROLAC TROMETHAMINE 15 MG: 30 INJECTION, SOLUTION INTRAMUSCULAR at 01:09

## 2019-06-21 RX ADMIN — PANTOPRAZOLE SODIUM 40 MG: 40 TABLET, DELAYED RELEASE ORAL at 21:00

## 2019-06-21 RX ADMIN — AMLODIPINE BESYLATE 5 MG: 5 TABLET ORAL at 09:22

## 2019-06-21 RX ADMIN — PAROXETINE HYDROCHLORIDE 20 MG: 20 TABLET, FILM COATED ORAL at 09:22

## 2019-06-21 RX ADMIN — SODIUM CHLORIDE: 9 INJECTION, SOLUTION INTRAVENOUS at 06:10

## 2019-06-21 RX ADMIN — PANTOPRAZOLE SODIUM 40 MG: 40 TABLET, DELAYED RELEASE ORAL at 09:22

## 2019-06-21 RX ADMIN — CARVEDILOL 6.25 MG: 6.25 TABLET, FILM COATED ORAL at 09:22

## 2019-06-21 RX ADMIN — CYCLOBENZAPRINE HYDROCHLORIDE 10 MG: 10 TABLET, FILM COATED ORAL at 13:02

## 2019-06-21 RX ADMIN — KETOROLAC TROMETHAMINE 15 MG: 30 INJECTION, SOLUTION INTRAMUSCULAR at 06:16

## 2019-06-21 RX ADMIN — CARVEDILOL 6.25 MG: 6.25 TABLET, FILM COATED ORAL at 17:54

## 2019-06-21 RX ADMIN — PREDNISONE 5 MG: 5 TABLET ORAL at 09:22

## 2019-06-21 RX ADMIN — ATORVASTATIN CALCIUM 80 MG: 80 TABLET, FILM COATED ORAL at 21:00

## 2019-06-21 RX ADMIN — HYDROCODONE BITARTRATE AND ACETAMINOPHEN 2 TABLET: 5; 325 TABLET ORAL at 06:13

## 2019-06-21 RX ADMIN — KETOROLAC TROMETHAMINE 15 MG: 30 INJECTION, SOLUTION INTRAMUSCULAR at 18:00

## 2019-06-21 RX ADMIN — DOCUSATE SODIUM 100 MG: 100 CAPSULE, LIQUID FILLED ORAL at 09:23

## 2019-06-21 RX ADMIN — PREDNISONE 5 MG: 5 TABLET ORAL at 17:54

## 2019-06-21 RX ADMIN — LOSARTAN POTASSIUM 50 MG: 50 TABLET, FILM COATED ORAL at 21:00

## 2019-06-21 RX ADMIN — CYCLOBENZAPRINE HYDROCHLORIDE 10 MG: 10 TABLET, FILM COATED ORAL at 21:02

## 2019-06-21 ASSESSMENT — PAIN SCALES - GENERAL
PAINLEVEL_OUTOF10: 2
PAINLEVEL_OUTOF10: 7
PAINLEVEL_OUTOF10: 9
PAINLEVEL_OUTOF10: 4
PAINLEVEL_OUTOF10: 2
PAINLEVEL_OUTOF10: 3
PAINLEVEL_OUTOF10: 9
PAINLEVEL_OUTOF10: 2
PAINLEVEL_OUTOF10: 3
PAINLEVEL_OUTOF10: 2
PAINLEVEL_OUTOF10: 7
PAINLEVEL_OUTOF10: 3
PAINLEVEL_OUTOF10: 7
PAINLEVEL_OUTOF10: 6
PAINLEVEL_OUTOF10: 7

## 2019-06-21 ASSESSMENT — PAIN - FUNCTIONAL ASSESSMENT
PAIN_FUNCTIONAL_ASSESSMENT: PREVENTS OR INTERFERES SOME ACTIVE ACTIVITIES AND ADLS
PAIN_FUNCTIONAL_ASSESSMENT: PREVENTS OR INTERFERES SOME ACTIVE ACTIVITIES AND ADLS

## 2019-06-21 ASSESSMENT — PAIN DESCRIPTION - DESCRIPTORS
DESCRIPTORS: ACHING
DESCRIPTORS: ACHING;THROBBING

## 2019-06-21 ASSESSMENT — PAIN DESCRIPTION - ORIENTATION
ORIENTATION: LEFT

## 2019-06-21 ASSESSMENT — PAIN DESCRIPTION - ONSET
ONSET: ON-GOING

## 2019-06-21 ASSESSMENT — PAIN DESCRIPTION - LOCATION
LOCATION: HIP

## 2019-06-21 ASSESSMENT — PAIN DESCRIPTION - FREQUENCY
FREQUENCY: CONTINUOUS
FREQUENCY: INTERMITTENT
FREQUENCY: INTERMITTENT
FREQUENCY: CONTINUOUS

## 2019-06-21 ASSESSMENT — PAIN DESCRIPTION - PAIN TYPE
TYPE: SURGICAL PAIN

## 2019-06-21 ASSESSMENT — PAIN DESCRIPTION - DIRECTION: RADIATING_TOWARDS: LEFT LEG

## 2019-06-21 ASSESSMENT — PAIN DESCRIPTION - PROGRESSION
CLINICAL_PROGRESSION: NOT CHANGED
CLINICAL_PROGRESSION: NOT CHANGED

## 2019-06-21 NOTE — PLAN OF CARE
elevation, ice, norco, and toradol. White board updated. Pain goal 3/10. Patient satisfied with pain management      Problem: Neurological  Goal: Maximum potential motor/sensory/cognitive function  Outcome: Ongoing  Note:   Patient denies numbness and tingling. Oriented x4. Moderate pedal push and pull to left leg. Problem: Cardiovascular  Goal: No DVT, peripheral vascular complications  Outcome: Ongoing  Note:   Vital signs stable. Peripheral vascular within normal limits. Denies calf pain and tenderness. Scds to bilateral legs. Problem: Respiratory  Goal: No pulmonary complications  Outcome: Ongoing  Note:   Lung sounds clear throughout. Oxygen saturation above 92% on room air. Encouraged to cough and deep breathe and use incentive spirometer 10 times every hour. Patient demonstrates incentive spirometer appropriately      Problem: GI  Goal: No bowel complications  Outcome: Ongoing  Note:   Bowel sounds active, abdomen soft and non-tender. Patient on stool softeners. Denies passing gas since surgery      Problem:   Goal: Adequate urinary output  Outcome: Ongoing  Note:   Adequate urine output. Urine is yellow and clear      Problem: Nutrition  Goal: Optimal nutrition therapy  Outcome: Ongoing  Note:   Patient on general diet. Denies nausea. Patient given evening snack      Problem: Skin Integrity/Risk  Goal: No skin breakdown during hospitalization  Outcome: Ongoing  Note:   Surgical incision to left hip, dressing clean, dry, and intact. No drainage noted. Legs elevated off bed. Head of bed lowered. Pillow in between legs while in bed. Patient educated on total hip protocol. Patient able to turn and reposition in bed as needed. Problem: Musculor/Skeletal Functional Status  Goal: Highest potential functional level  Outcome: Ongoing  Note:   Moderate pedal push and pull to left leg. Patient up with 2 assist, gait belt, and walker. Care plan reviewed with patient.   Patient  verbalize understanding of the plan of care and contribute to goal setting.

## 2019-06-21 NOTE — PROGRESS NOTES
impulsive with decreased insight and safety awareness     ADL;s:  Grooming: Stand by assistance  UE Bathing: Verbal cueing, Setup, Increased time to complete  LE Bathing: Moderate assistance  UE Dressing: Minimal assistance  LE Dressing: Moderate assistance  Additional Comments: Pt completed sponge bath at sink this date, max cues to maintain hip precuations. education on adaptive strategies an dtechniques initiatied, needs reinforced        Functional Mobility:  Bed mobility  Supine to Sit: Contact guard assistance(HOB flat, no bedrail)  Scooting: Stand by assistance    Functional Mobility  Functional - Mobility Device: Rolling Walker  Activity: To/from bathroom  Assist Level: Stand by assistance  Functional Mobility Comments: cues needed for safety and to maintain hip precuations, pt moves quickly, very impulsive      Balance:  Balance  Sitting Balance: Stand by assistance(-S. pt with tendency to lean forward to reach for items breaking hip precuations, vc needed throughout session)  Standing Balance: Contact guard assistance(BUE dynamic reaching task, SBA static stand)  Standing Balance  Time: 3 minutes X 2 trials, 2 minutes  Activity: ADLs    Transfers:  Sit to stand: Stand by assistance, Minimal assistance(min A from lower surface with cues for hip precautions, SBA from recliner, with cues needed for hand placement )  Stand to sit: Stand by assistance       Upper Extremity Assessment:   LUE AROM : WFL  RUE AROM : WFL    LUE Strength  Gross LUE Strength: WFL  RUE Strength  Gross RUE Strength: WFL    RUE Tone: Normotonic  LUE Tone: Normotonic       Activity Tolerance: Patient Tolerated treatment well       Assessment:  Assessment: Pt s/p L DEVANTE with new hip precuations. Pt exhibiting deficits detailed below, requiring additional OT intervention to restore pt to independent PLOF.  Pt now requiring assist for ADLs, IADLs, mobility, t/fs, and standing balance and is very impulsive with movement needing constant cues for hip precautions. Without skilled OT intervention pt at risk for functional decline, increased falls, and readmission to hospital.    Performance deficits / Impairments: Decreased functional mobility , Decreased ADL status, Decreased endurance, Decreased high-level IADLs, Decreased safe awareness, Decreased strength, Decreased balance  Prognosis: Good  REQUIRES OT FOLLOW UP: Yes    Treatment Initiated: Treatment and education initiated within context of evaluation. Evaluation time included review of current medical information, gathering information related to past medical, social and functional history, completion of standardized testing, formal and informal observation of tasks, assessment of data and development of plan of care and goals. Treatment time included skilled education and facilitation of tasks to increase safety and independence with ADL's for improved functional independence and quality of life.     [facility]  [address] [facility] or assist    Patient Education:  Patient Education: OT POC, role of OT, hip precuations, safet with ADLs, initiated [employer] education    Equipment Recommendations:       Plan:  Times per week: 6x  Current Treatment Recommendations: Strengthening, Balance Training, Functional Mobility Training, Endurance Training, Patient/Caregiver Education & Training, Self-Care / ADL, Safety Education & Training    Goals:  Patient goals : to go home  Short term goals  Time Frame for Short term goals: by discharge  Short term goal 1: Pt will complete LB ADLs with SBA and LHAE prn with no vc for hip precautions  Short term goal 2: Pt will complete dynamic standing task with S for > 3 minutes with no LOB or vc for hip precuations to increase ease with IADLs  Short term goal 3: Pt will complete functional mobility within environment with S and RW with no vc for hip precautions to incresae ease with kitchen mobility  Short term goal 4: Pt will complete simple IADL with S and no vc for hip precautions  Short term goal 5: Pt will demo indep with BUE strengthening HEP toincrease UB strength and activity tolerance needed for ADLs  Long term goals  Time Frame for Long term goals : No LTG established d/t short ELOS    Following session, patient left in safe position with all fall risk precautions in place.

## 2019-06-21 NOTE — CONSULTS
INITIAL CONSULTATION    Chief Complaint  No chief complaint on file. Current Status/[place]  Consulted for med management of chronic problems - hypertension, hyperlipidemia, hypothyroidism. Patient is POD 0 left DEVANTE. Patient is doing well. Past Medical History Complicated by  Past Medical History:   Diagnosis Date    CAD (coronary artery disease)     Hyperlipidemia     Hypertension     Primary osteoarthritis of left hip 2019    Thyroid disease        Past Surgical History  Past Surgical History:   Procedure Laterality Date    CARDIAC SURGERY  2004    cabg x1    CARPAL TUNNEL RELEASE Right      SECTION      x2    CHOLECYSTECTOMY      EYE SURGERY Bilateral 2015    cataracts    JOINT REPLACEMENT Right 2013    hip       Family History  History reviewed. No pertinent family history. Social History  Social History     Socioeconomic History    Marital status:       Spouse name: None    Number of children: None    Years of education: None    Highest education level: None   Occupational History    None   Social Needs    Financial resource strain: None    Food insecurity:     Worry: None     Inability: None    Transportation needs:     Medical: None     Non-medical: None   Tobacco Use    Smoking status: Former Smoker     Types: Cigarettes     Last attempt to quit: 2004     Years since quitting: 15.4    Smokeless tobacco: Never Used   Substance and Sexual Activity    Alcohol use: None     Comment: occ    Drug use: Not Currently    Sexual activity: None   Lifestyle    Physical activity:     Days per week: None     Minutes per session: None    Stress: None   Relationships    Social connections:     Talks on phone: None     Gets together: None     Attends Worship service: None     Active member of club or organization: None     Attends meetings of clubs or organizations: None     Relationship status: None    Intimate partner violence:     Fear of current or ex partner: None     Emotionally abused: None     Physically abused: None     Forced sexual activity: None   Other Topics Concern    None   Social History Narrative    None       Allergies  Allergies   Allergen Reactions    Codeine      hallucinate    Lisinopril      cough       Current Medications  Current Facility-Administered Medications   Medication Dose Route Frequency Provider Last Rate Last Dose    0.9 % sodium chloride infusion   Intravenous Continuous CINDY Villarreal  mL/hr at 06/20/19 2026      morphine (PF) injection 2 mg  2 mg Intravenous Q2H PRN CINDY Villarreal CNP        Or    morphine (PF) injection 4 mg  4 mg Intravenous Q2H PRN CINDY Villarreal CNP        HYDROcodone-acetaminophen (NORCO) 5-325 MG per tablet 1 tablet  1 tablet Oral Q4H PRN CINDY Villarreal CNP        Or    HYDROcodone-acetaminophen (NORCO) 5-325 MG per tablet 2 tablet  2 tablet Oral Q4H PRN CINDY Nevarez CNP   2 tablet at 06/20/19 2210    cyclobenzaprine (FLEXERIL) tablet 10 mg  10 mg Oral TID PRN CINDY Nevarez CNP        ketorolac (TORADOL) injection 15 mg  15 mg Intravenous Q6H CINDY Villarreal CNP   15 mg at 06/20/19 1858    ondansetron (ZOFRAN) injection 4 mg  4 mg Intravenous Q6H PRN CINDY Villarreal CNP        rivaroxaban (XARELTO) tablet 10 mg  10 mg Oral Daily CINDY Villarreal CNP   10 mg at 06/20/19 1858    docusate sodium (COLACE) capsule 100 mg  100 mg Oral Daily CINDY Villarreal CNP   100 mg at 06/20/19 1858    magnesium hydroxide (MILK OF MAGNESIA) 400 MG/5ML suspension 30 mL  30 mL Oral Daily PRN CINDY Villarreal CNP        acetaminophen (TYLENOL) tablet 650 mg  650 mg Oral Q4H PRN CINDY Villarreal CNP        ceFAZolin (ANCEF) 1 g in dextrose 5 % 50 mL IVPB (premix)  1 g Intravenous Q8H CINDY Villarreal CNP   Stopped at 06/20/19 1928    [START ON 6/21/2019] amLODIPine (NORVASC) tablet 5 mg  5 mg Oral Daily CINDY Villarreal CNP        atorvastatin (LIPITOR) tablet 80 mg  80 mg Oral Nightly CINDY Villarreal CNP   80 mg at 06/20/19 2036    carvedilol (COREG) tablet 6.25 mg  6.25 mg Oral BID WC CINDY Villarreal CNP   6.25 mg at 06/20/19 1858    levothyroxine (SYNTHROID) tablet 75 mcg  75 mcg Oral Daily CINDY Villarreal CNP   75 mcg at 06/20/19 1858    [START ON 6/21/2019] linaCLOtide (LINZESS) capsule 72 mcg  72 mcg Oral QAM AC CINDY Villarreal CNP        losartan (COZAAR) tablet 50 mg  50 mg Oral BID CINDY Villarreal CNP   50 mg at 06/20/19 2036    [START ON 6/21/2019] omega-3 acid ethyl esters (LOVAZA) capsule 1,000 mg  1,000 mg Oral Daily CINDY Villarreal CNP        oxybutynin (DITROPAN-XL) extended release tablet 15 mg  15 mg Oral Daily CINDY Villarreal CNP   15 mg at 06/20/19 1858    pantoprazole (PROTONIX) tablet 40 mg  40 mg Oral BID CINDY Villarreal CNP   40 mg at 06/20/19 2036    [START ON 6/21/2019] PARoxetine (PAXIL) tablet 20 mg  20 mg Oral QAM CINDY Villarreal CNP        polyethylene glycol (GLYCOLAX) packet 17 g  17 g Oral Daily PRN CINDY Villarreal CNP        predniSONE (DELTASONE) tablet 5 mg  5 mg Oral BID CINDY Villarreal CNP   5 mg at 06/20/19 1858    rOPINIRole (REQUIP) tablet 2 mg  2 mg Oral Nightly CINDY Villarreal CNP   2 mg at 06/20/19 2036    traZODone (DESYREL) tablet 200 mg  200 mg Oral Nightly Joyce A Heitmeyer, APRN - CNP           Review of Systems  Review of Systems   All other systems reviewed and are negative. Physical Exam  Physical Exam   Constitutional: She is oriented to person, place, and time. She appears well-developed and well-nourished. No distress. HENT:   Head: Normocephalic and atraumatic.    Nose: Nose normal.   Eyes: Pupils are equal,

## 2019-06-21 NOTE — PROGRESS NOTES
[address] [address]  INPATIENT PHYSICAL THERAPY  DAILY NOTE  [facility] ORTHOPEDICS 7K - 5W-76/442-X    Time In: 1325  Time Out: 1348  Timed Code Treatment Minutes: 23 Minutes  Minutes: 23          Date: 2019  Patient Name: Charlene Hightower,  Gender:  female        MRN: [id number]  : 1950  (68 y.o.)     Referring Practitioner: Dr Bertrand Baker  Diagnosis: Primary Osteoarthritis of left hip  Additional Pertinent Hx: Pt admitted for left THR sx 6-20. Prior Level of Function:  Lives With: Alone(but will stay with daughter once able to move mostly onher own)  Type of Home: House  Home Layout: One level  Home Access: Stairs to enter without rails  Entrance Stairs - Number of Steps: 1 platfrom step  Home Equipment: Rolling walker    Restrictions/Precautions:  Restrictions/Precautions: Weight Bearing, Fall Risk, Surgical Protocols  Left Lower Extremity Weight Bearing: Weight Bearing As Tolerated  Position Activity Restriction  Hip Precautions: No hip flexion > 90 degrees, No ADduction, Posterior hip precautions, No hip internal rotation  Other position/activity restrictions: WBAT left THR    SUBJECTIVE: RN approved session. Pt in bed at arrival, agreeable to session. Pt states she is sore this afternoon. PAIN: 610: LLE    OBJECTIVE:  Bed Mobility:  Rolling to Right: Stand By Assistance   Supine to Sit: Contact Guard Assistance  Sit to Supine: Contact Guard Assistance   Scooting: Stand By Assistance    Transfers:  Sit to Stand: Stand By Assistance  Stand to Sit:Stand By Assistance    Ambulation:  Stand By Assistance  Distance: 60ftx2  Surface: Level Tile  Device:Rolling Walker  Gait Deviations:  Decreased Step Length Bilaterally, Decreased Heel Strike on Left. Decrease zunilda, decrease stance time on LLE, slightly unsteady, no LOB, impulsive    EXERCISE:  Patient was guided in seated BLE ex set(s) 10 reps of exercise to both lower extremities. Ankle pumps, Glut sets, Seated hip flexion and Long arc quads. Exercises were completed for increased independence with functional mobility. Stairs:  Stand By Assistance  Number of Steps: 1, performed x2   Height: 6\" step with Rolling Walker, curb step, cues for sequencing, pt slightly impulsive. Functional Outcome Measures: Completed  AM-PAC Inpatient Mobility without Stair Climbing Raw Score : 15  AM-PAC Inpatient without Stair Climbing T-Scale Score : 43.03    ASSESSMENT:  Activity Tolerance:  Patient tolerance of  treatment: good. Pt return to bed at end of session. Pt able to recall 3/3 hip precautions, however still needing cues throughout session to maintain. Pt impulsive at times and lacks safety awareness. Pt would benefit from cont therapy.         Equipment Recommendations:Equipment Needed: No  Discharge Recommendations:  Discharge Recommendations: Patient would benefit from continued therapy after discharge, Continue to assess pending progress    Plan: Times per week: BID 6 X and daily X 1  Specific instructions for Next Treatment: HEP if she doesn't have one, written precautions , platform step, car transfer  Current Treatment Recommendations: Strengthening, Gait Training, Stair training, Balance Training, Functional Mobility Training, Endurance Training, Transfer Training, Home Exercise Program    Patient Education  Patient Education: Plan of Care, Gait, Stairs, Hip precautions    Goals:  Patient goals : Go to TCU if able then home with dtr when can handle herself with mobility  Short term goals  Time Frame for Short term goals: by discharge   Short term goal 1: supine to sit and return with S to get in and out of bed   Short term goal 2: sit to stand with S to get on and off various surfaces  Short term goal 3: ambulate with  feet with S to walk safely in the home  Short term goal 4: ascend/descend a platform step with AD and CGA to enter home  Short term goal 5: car transfer with Min A to leave hospitral for home  Long term goals  Time Frame for Long term goals : NA due to short ELOS    Following session, patient left in safe position with all fall risk precautions in place.

## 2019-06-21 NOTE — PROGRESS NOTES
[facility]  INPATIENT PHYSICAL THERAPY  EVALUATION  [facility] ORTHOPEDICS 7K - 0W-33/036-X    Time In: 825  Time Out: 9277  Timed Code Treatment Minutes: 34 Minutes  Minutes: 39          Date: 2019  Patient Name: Kathleen Andrade,  Gender:  female        MRN: [id number]  : 1950  (68 y.o.)      Referring Practitioner: Dr Deborah Sexton  Diagnosis: Primary Osteoarthritis of left hip  Additional Pertinent Hx: Pt admitted for left THR sx . Past Medical History:   Diagnosis Date    CAD (coronary artery disease)     Hyperlipidemia     Hypertension     Primary osteoarthritis of left hip 2019    Thyroid disease      Past Surgical History:   Procedure Laterality Date    CARDIAC SURGERY  2004    cabg x1    CARPAL TUNNEL RELEASE Right      SECTION      x2    CHOLECYSTECTOMY      EYE SURGERY Bilateral 2015    cataracts    JOINT REPLACEMENT Right 2013    hip       Restrictions/Precautions:  Weight Bearing, Fall Risk, Surgical Protocols           Left Lower Extremity Weight Bearing: Weight Bearing As Tolerated        Hip Precautions: No hip flexion > 90 degrees, No ADduction, Posterior hip precautions, No hip internal rotation  Other position/activity restrictions: WBAT left THR       Subjective:  Chart Reviewed: Yes  Patient assessed for rehabilitation services?: Yes  Family / Caregiver Present: No  Subjective: Pt in bed agreed to PT . Pt motivated     General:  Overall Orientation Status: Within Functional Limits  Follows Commands: Within Functional Limits    Vision: Within Functional Limits    Hearing: Within functional limits         Pain:  Yes.   Pain Assessment  Pain Assessment: 0-10  Pain Level: 3  Pain Type: Surgical pain  Pain Location: Hip  Pain Orientation: Left       Social/Functional History:    Lives With: Alone(but will stay with daughter once able to move mostly onher own)  Type of Home: House  Home Layout: One level  Home Access: Stairs to enter without rails  Entrance Stairs - Number of Steps: 1 platfrom step  Home Equipment: Rolling walker             ADL Assistance: Independent     Ambulation Assistance: Independent  Transfer Assistance: Independent    Active : Yes     Additional Comments: Pt has neede more help with household chores due to increased pain in left hip over the last month. Has been using RW due to hip pain      Objective:  RLE PROM: WNL    RLE AROM: WNL    LLE PROM: Exceptions  LLE General PROM: hip flexion seated about 80 degrees, knee and ankle WFLs          Strength RLE: WFL    Strength LLE: Exception  Comment: min A with S. L.Rs, good quad contraction, DF WFL's             Balance  Sitting - Static: Good  Sitting - Dynamic: Good  Standing - Static: Good, -  Standing - Dynamic: Fair    Supine to Sit: Contact guard assistance(HOB flat, no bedrail)  Scooting: Stand by assistance    Transfers  Sit to Stand: Contact guard assistance(cues on  hand placement and position of left LE, cues to watcg to keep hip flexion less than 90 degrees )  Stand to sit: Contact guard assistance(to toilet and chair )       Ambulation 1  Surface: level tile  Device: Rolling Walker  Assistance: Contact guard assistance  Quality of Gait: decreased stance left LE, cues on technqiue for step through , cues to slow down, cues on turns so as not to pivot on left LE  Distance: 20 feet X 2             Exercises:  Comments: Pt instructed in 10 reps quad and glute sets and ankle pumps and heelslides to improve strength for function          Activity Tolerance:  Activity Tolerance: Patient Tolerated treatment well    Treatment Initiated: see there ex the patient ambulated with RW CGA 80 feet with decreased stance left LE, step through technique,educated in THR precautions     Assessment:   Body structures, Functions, Activity limitations: Decreased functional mobility , Decreased endurance, Decreased ROM, Decreased strength, Decreased balance  Assessment: Pt limited due to THR sx 6-20 resulting in decreased ROM ,strength ,pain and decreased functional mobility. Pt needs skilled PT to improve ROM ,strength and safety with gait   Prognosis: Excellent    Clinical Presentation: Moderate - Evolving with Changing Characteristics: Pt limited due to THR sx 6-20 resulting in decreased ROM ,strength ,pain and decreased functional mobility. Pt needs skilled PT to improve ROM ,strength and safety with gait     Decision Making:  Moderate Complexitybased on patient assessment and decision making process of determining plan of care and establishing reasonable expectations for measurable functional outcomes    REQUIRES PT FOLLOW UP: Yes    Discharge Recommendations:  Discharge Recommendations: Patient would benefit from continued therapy after discharge, Continue to assess pending progress    Patient Education:  Patient Education: POC, THR precautions,ther ex     Equipment Recommendations:  Equipment Needed: No    Safety:  Type of devices: Left in chair, Call light within reach, Chair alarm in place, Nurse notified, Gait belt, All fall risk precautions in place  Restraints  Initially in place: No    Plan:  Times per week: BID 6 X and daily X 1  Specific instructions for Next Treatment: HEP if she doesn't have one, written precautions , platform step, car transfer  Current Treatment Recommendations: Strengthening, Gait Training, Stair training, Balance Training, Functional Mobility Training, Endurance Training, Transfer Training, Home Exercise Program    Goals:  Patient goals : Go to TCU if able then home with dtr when can handle herself with mobility  Short term goals  Time Frame for Short term goals: by discharge   Short term goal 1: supine to sit and return with S to get in and out of bed   Short term goal 2: sit to stand with S to get on and off various surfaces  Short term goal 3: ambulate with  feet with S to walk safely in the home  Short term goal 4: ascend/descend a platform step with AD and CGA to

## 2019-06-21 NOTE — PROGRESS NOTES
Hospitalist Progress Note    Patient:  Dora Parker      Unit/Bed:7K-09/009-A    YOB: 1950    MRN: [id number]       Acct: [de-identified]     PCP: Mimi Owens MD    Date of Admission: 6/20/2019    Chief Complaint: medical management of HTN and HLD     Hospital Course: Consulted for med management of chronic problems - hypertension, hyperlipidemia, hypothyroidism. Pt is POD 1 of left DEVANTE. Doing well. Subjective: no acute events overnight. No fevers, chills, chest pain or sob. Tolerating PO intake. Medications:  Reviewed    Infusion Medications    sodium chloride 100 mL/hr at 06/21/19 9160     Scheduled Medications    ketorolac  15 mg Intravenous Q6H    rivaroxaban  10 mg Oral Daily    docusate sodium  100 mg Oral Daily    amLODIPine  5 mg Oral Daily    atorvastatin  80 mg Oral Nightly    carvedilol  6.25 mg Oral BID WC    levothyroxine  75 mcg Oral Daily    linaCLOtide  72 mcg Oral QAM AC    losartan  50 mg Oral BID    omega-3 acid ethyl esters  1,000 mg Oral Daily    oxybutynin  15 mg Oral Daily    pantoprazole  40 mg Oral BID    PARoxetine  20 mg Oral QAM    predniSONE  5 mg Oral BID    rOPINIRole  2 mg Oral Nightly    traZODone  200 mg Oral Nightly     PRN Meds: morphine **OR** morphine, HYDROcodone 5 mg - acetaminophen **OR** HYDROcodone 5 mg - acetaminophen, cyclobenzaprine, ondansetron, magnesium hydroxide, acetaminophen, polyethylene glycol      Intake/Output Summary (Last 24 hours) at 6/21/2019 1016  Last data filed at 6/21/2019 0610  Gross per 24 hour   Intake 3339 ml   Output 600 ml   Net 2739 ml       Diet:  DIET GENERAL;    Exam:  BP (!) 155/60   Pulse 94   Temp 98.6 °F (37 °C) (Oral)   Resp 18   Ht 5' 2\" (1.575 m)   Wt 130 lb 8.2 oz (59.2 kg)   SpO2 92%   BMI 23.87 kg/m²     General appearance: No apparent distress, appears stated age and cooperative. HEENT: Pupils equal, round, and reactive to light. Conjunctivae/corneas clear.   Neck: Supple, with full range of motion. No jugular venous distention. Trachea midline. Respiratory:  Normal respiratory effort. Clear to auscultation, bilaterally without Rales/Wheezes/Rhonchi. Cardiovascular: Regular rate and rhythm with normal S1/S2 without murmurs, rubs or gallops. Abdomen: Soft, non-tender, non-distended with normal bowel sounds. Musculoskeletal: left hip ROM limited due to pain   Skin: Skin color, texture, turgor normal.  No rashes or lesions. Neurologic:  Neurovascularly intact without any focal sensory/motor deficits. Cranial nerves: II-XII intact, grossly non-focal.  Psychiatric: Alert and oriented, thought content appropriate, normal insight  Capillary Refill: Brisk,< 3 seconds   Peripheral Pulses: +2 palpable, equal bilaterally       Labs:   Recent Labs     06/21/19  0611   WBC 16.3*   HGB 9.5*   HCT 31.0*        Recent Labs     06/21/19  0611      K 4.1      CO2 22*   BUN 16   CREATININE 0.9   CALCIUM 8.6     No results for input(s): AST, ALT, BILIDIR, BILITOT, ALKPHOS in the last 72 hours. No results for input(s): INR in the last 72 hours. No results for input(s): Giovanni Dusky in the last 72 hours. Urinalysis:    No results found for: Harris Budds, BACTERIA, RBCUA, BLOODU, SPECGRAV, [address]    Radiology:  XR HIP LEFT (2-3 VIEWS)   Final Result   Postop appearance left hip. **This report has been created using voice recognition software. It may contain minor errors which are inherent in voice recognition technology. **      Final report electronically signed by Dr. Suyapa Skinner on 6/20/2019 10:52 AM          Diet: DIET GENERAL;    Code Status: Full Code    Assessment/Plan:    Anticipated Discharge in : per primary service     Active Hospital Problems    Diagnosis Date Noted    Essential hypertension [I10] 06/21/2019    Primary osteoarthritis of left hip [M16.12] 06/20/2019            Assessment/Plan:    1. Left DEVANTE - primary to manage.   2. Hypertension - home medications resumed. Blood pressures have been well controlled. 3. Hypothyroidism - continue synthroid 75 mcg daily   4. Depression. Cont paxil and trazodone        Pt ok to discharge from our standpoint.        Please call with any questions         Electronically signed by Dino Munroe MD on 6/21/2019 at 10:16 AM

## 2019-06-22 LAB
ANION GAP SERPL CALCULATED.3IONS-SCNC: 8 MEQ/L (ref 8–16)
BASOPHILS # BLD: 0.3 %
BASOPHILS ABSOLUTE: 0 THOU/MM3 (ref 0–0.1)
BUN BLDV-MCNC: 11 MG/DL (ref 7–22)
CALCIUM SERPL-MCNC: 8.3 MG/DL (ref 8.5–10.5)
CHLORIDE BLD-SCNC: 114 MEQ/L (ref 98–111)
CO2: 22 MEQ/L (ref 23–33)
CREAT SERPL-MCNC: 0.7 MG/DL (ref 0.4–1.2)
EOSINOPHIL # BLD: 1 %
EOSINOPHILS ABSOLUTE: 0.1 THOU/MM3 (ref 0–0.4)
ERYTHROCYTE [DISTWIDTH] IN BLOOD BY AUTOMATED COUNT: 14.4 % (ref 11.5–14.5)
ERYTHROCYTE [DISTWIDTH] IN BLOOD BY AUTOMATED COUNT: 55.4 FL (ref 35–45)
GFR SERPL CREATININE-BSD FRML MDRD: 82 ML/MIN/1.73M2
GLUCOSE BLD-MCNC: 100 MG/DL (ref 70–108)
HCT VFR BLD CALC: 26.5 % (ref 37–47)
HEMOGLOBIN: 7.9 GM/DL (ref 12–16)
IMMATURE GRANS (ABS): 0.08 THOU/MM3 (ref 0–0.07)
IMMATURE GRANULOCYTES: 0.7 %
LYMPHOCYTES # BLD: 18.4 %
LYMPHOCYTES ABSOLUTE: 2.1 THOU/MM3 (ref 1–4.8)
MCH RBC QN AUTO: 31.6 PG (ref 26–33)
MCHC RBC AUTO-ENTMCNC: 29.8 GM/DL (ref 32.2–35.5)
MCV RBC AUTO: 106 FL (ref 81–99)
MONOCYTES # BLD: 9.3 %
MONOCYTES ABSOLUTE: 1 THOU/MM3 (ref 0.4–1.3)
NUCLEATED RED BLOOD CELLS: 0 /100 WBC
PLATELET # BLD: 278 THOU/MM3 (ref 130–400)
PMV BLD AUTO: 9 FL (ref 9.4–12.4)
POTASSIUM SERPL-SCNC: 4 MEQ/L (ref 3.5–5.2)
RBC # BLD: 2.5 MILL/MM3 (ref 4.2–5.4)
SEG NEUTROPHILS: 70.3 %
SEGMENTED NEUTROPHILS ABSOLUTE COUNT: 7.9 THOU/MM3 (ref 1.8–7.7)
SODIUM BLD-SCNC: 144 MEQ/L (ref 135–145)
WBC # BLD: 11.2 THOU/MM3 (ref 4.8–10.8)

## 2019-06-22 PROCEDURE — 80048 BASIC METABOLIC PNL TOTAL CA: CPT

## 2019-06-22 PROCEDURE — 2580000003 HC RX 258: Performed by: INTERNAL MEDICINE

## 2019-06-22 PROCEDURE — 6370000000 HC RX 637 (ALT 250 FOR IP): Performed by: NURSE PRACTITIONER

## 2019-06-22 PROCEDURE — 36415 COLL VENOUS BLD VENIPUNCTURE: CPT

## 2019-06-22 PROCEDURE — 97530 THERAPEUTIC ACTIVITIES: CPT

## 2019-06-22 PROCEDURE — 6370000000 HC RX 637 (ALT 250 FOR IP): Performed by: INTERNAL MEDICINE

## 2019-06-22 PROCEDURE — 99232 SBSQ HOSP IP/OBS MODERATE 35: CPT | Performed by: INTERNAL MEDICINE

## 2019-06-22 PROCEDURE — 2580000003 HC RX 258: Performed by: NURSE PRACTITIONER

## 2019-06-22 PROCEDURE — 97116 GAIT TRAINING THERAPY: CPT

## 2019-06-22 PROCEDURE — 6360000002 HC RX W HCPCS: Performed by: NURSE PRACTITIONER

## 2019-06-22 PROCEDURE — 1200000000 HC SEMI PRIVATE

## 2019-06-22 PROCEDURE — 85025 COMPLETE CBC W/AUTO DIFF WBC: CPT

## 2019-06-22 PROCEDURE — 97535 SELF CARE MNGMENT TRAINING: CPT

## 2019-06-22 RX ADMIN — ATORVASTATIN CALCIUM 80 MG: 80 TABLET, FILM COATED ORAL at 21:40

## 2019-06-22 RX ADMIN — LOSARTAN POTASSIUM 50 MG: 50 TABLET, FILM COATED ORAL at 21:41

## 2019-06-22 RX ADMIN — PREDNISONE 5 MG: 5 TABLET ORAL at 09:43

## 2019-06-22 RX ADMIN — PANTOPRAZOLE SODIUM 40 MG: 40 TABLET, DELAYED RELEASE ORAL at 09:43

## 2019-06-22 RX ADMIN — AMLODIPINE BESYLATE 5 MG: 5 TABLET ORAL at 12:41

## 2019-06-22 RX ADMIN — SODIUM CHLORIDE: 9 INJECTION, SOLUTION INTRAVENOUS at 12:38

## 2019-06-22 RX ADMIN — ROPINIROLE HYDROCHLORIDE 2 MG: 1 TABLET, FILM COATED ORAL at 21:41

## 2019-06-22 RX ADMIN — PAROXETINE HYDROCHLORIDE 20 MG: 20 TABLET, FILM COATED ORAL at 09:43

## 2019-06-22 RX ADMIN — PREDNISONE 5 MG: 5 TABLET ORAL at 19:44

## 2019-06-22 RX ADMIN — RIVAROXABAN 10 MG: 10 TABLET, FILM COATED ORAL at 19:44

## 2019-06-22 RX ADMIN — CARVEDILOL 6.25 MG: 6.25 TABLET, FILM COATED ORAL at 12:41

## 2019-06-22 RX ADMIN — LEVOTHYROXINE SODIUM 75 MCG: 75 TABLET ORAL at 06:38

## 2019-06-22 RX ADMIN — TRAZODONE HYDROCHLORIDE 200 MG: 100 TABLET ORAL at 21:41

## 2019-06-22 RX ADMIN — CYCLOBENZAPRINE HYDROCHLORIDE 10 MG: 10 TABLET, FILM COATED ORAL at 05:24

## 2019-06-22 RX ADMIN — DOCUSATE SODIUM 100 MG: 100 CAPSULE, LIQUID FILLED ORAL at 09:43

## 2019-06-22 RX ADMIN — OMEGA-3-ACID ETHYL ESTERS 1000 MG: 1 CAPSULE, LIQUID FILLED ORAL at 09:43

## 2019-06-22 RX ADMIN — KETOROLAC TROMETHAMINE 15 MG: 30 INJECTION, SOLUTION INTRAMUSCULAR at 06:38

## 2019-06-22 RX ADMIN — KETOROLAC TROMETHAMINE 15 MG: 30 INJECTION, SOLUTION INTRAMUSCULAR at 01:17

## 2019-06-22 RX ADMIN — MAGNESIUM HYDROXIDE 30 ML: 400 SUSPENSION ORAL at 09:43

## 2019-06-22 RX ADMIN — POLYETHYLENE GLYCOL 3350 17 G: 17 POWDER, FOR SOLUTION ORAL at 09:43

## 2019-06-22 RX ADMIN — OXYBUTYNIN CHLORIDE 15 MG: 10 TABLET, EXTENDED RELEASE ORAL at 09:43

## 2019-06-22 RX ADMIN — CARVEDILOL 6.25 MG: 6.25 TABLET, FILM COATED ORAL at 21:40

## 2019-06-22 RX ADMIN — PANTOPRAZOLE SODIUM 40 MG: 40 TABLET, DELAYED RELEASE ORAL at 21:40

## 2019-06-22 RX ADMIN — LOSARTAN POTASSIUM 50 MG: 50 TABLET, FILM COATED ORAL at 12:41

## 2019-06-22 RX ADMIN — HYDROCODONE BITARTRATE AND ACETAMINOPHEN 2 TABLET: 5; 325 TABLET ORAL at 15:16

## 2019-06-22 RX ADMIN — HYDROCODONE BITARTRATE AND ACETAMINOPHEN 2 TABLET: 5; 325 TABLET ORAL at 09:43

## 2019-06-22 RX ADMIN — SODIUM CHLORIDE: 9 INJECTION, SOLUTION INTRAVENOUS at 01:12

## 2019-06-22 RX ADMIN — HYDROCODONE BITARTRATE AND ACETAMINOPHEN 2 TABLET: 5; 325 TABLET ORAL at 19:44

## 2019-06-22 RX ADMIN — CYCLOBENZAPRINE HYDROCHLORIDE 10 MG: 10 TABLET, FILM COATED ORAL at 21:40

## 2019-06-22 RX ADMIN — HYDROCODONE BITARTRATE AND ACETAMINOPHEN 2 TABLET: 5; 325 TABLET ORAL at 05:17

## 2019-06-22 RX ADMIN — HYDROCODONE BITARTRATE AND ACETAMINOPHEN 2 TABLET: 5; 325 TABLET ORAL at 01:11

## 2019-06-22 ASSESSMENT — PAIN SCALES - GENERAL
PAINLEVEL_OUTOF10: 7
PAINLEVEL_OUTOF10: 7
PAINLEVEL_OUTOF10: 10
PAINLEVEL_OUTOF10: 3
PAINLEVEL_OUTOF10: 7
PAINLEVEL_OUTOF10: 5
PAINLEVEL_OUTOF10: 10
PAINLEVEL_OUTOF10: 7
PAINLEVEL_OUTOF10: 10
PAINLEVEL_OUTOF10: 7

## 2019-06-22 ASSESSMENT — PAIN DESCRIPTION - DESCRIPTORS: DESCRIPTORS: SORE

## 2019-06-22 ASSESSMENT — PAIN - FUNCTIONAL ASSESSMENT
PAIN_FUNCTIONAL_ASSESSMENT: 0-10
PAIN_FUNCTIONAL_ASSESSMENT: PREVENTS OR INTERFERES SOME ACTIVE ACTIVITIES AND ADLS

## 2019-06-22 NOTE — PROGRESS NOTES
Pt voiced to this nurse that she had someone come to the house to check on some \"black spots\" on her walls. They informed pts daughter that she has \"black mold\" in her house. She will need to have someone remove some walls to have it removed. Pt has also informed this nurse earlier in the day that she has had \"bronchitis\" for 6 weeks. Dr Wynn Heads sent message and informed of above conversation with this nurse and there is no further intervention needed for treatment. Pt notified that dr was notified and no further treatment was needed for her \"bronchitis\".

## 2019-06-22 NOTE — PROGRESS NOTES
[address] [address]  INPATIENT PHYSICAL THERAPY  DAILY NOTE  [facility] ORTHOPEDICS 7K - 3L-15/810-I    Time In: 6304  Time Out: 1056  Timed Code Treatment Minutes: 23 Minutes  Minutes: 23          Date: 2019  Patient Name: Kim Yeager,  Gender:  female        MRN: [id number]  : 1950  (68 y.o.)     Referring Practitioner: Dr Ozzy Reed  Diagnosis: Primary Osteoarthritis of left hip  Additional Pertinent Hx: Pt admitted for left THR sx . Past Medical History:   Diagnosis Date    CAD (coronary artery disease)     Hyperlipidemia     Hypertension     Primary osteoarthritis of left hip 2019    Thyroid disease      Past Surgical History:   Procedure Laterality Date    CARDIAC SURGERY  2004    cabg x1    CARPAL TUNNEL RELEASE Right      SECTION      x2    CHOLECYSTECTOMY      EYE SURGERY Bilateral 2015    cataracts    JOINT REPLACEMENT Right 2013    hip       Restrictions/Precautions:  Weight Bearing, Fall Risk, Surgical Protocols           Left Lower Extremity Weight Bearing: Weight Bearing As Tolerated        Hip Precautions: No hip flexion > 90 degrees, No ADduction, Posterior hip precautions, No hip internal rotation  Other position/activity restrictions: WBAT left THR       Prior Level of Function:  ADL Assistance: Independent  Homemaking Assistance: Independent  Ambulation Assistance: Independent  Transfer Assistance: Independent  Additional Comments: Pt has neede more help with household chores due to increased pain in left hip over the last month. Has been using RW due to hip pain    Subjective:     Subjective: RN approved session, pt in bathroom getting cleaned up with tech. Pt agreeable to PT. Pain:   .      Pre Treatment Pain Screening  Pain at present: 2  Scale Used: Numeric Score  Intervention List: Patient able to continue with treatment    Social/Functional:  Type of Home: House  Home Layout: One level  Home Access: Stairs to enter without rails  Entrance Stairs - for Short term goals: by discharge   Short term goal 1: supine to sit and return with S to get in and out of bed   Short term goal 2: sit to stand with S to get on and off various surfaces  Short term goal 3: ambulate with  feet with S to walk safely in the home  Short term goal 4: ascend/descend a platform step with AD and CGA to enter home  Short term goal 5: car transfer with Min A to leave hospitral for home    Long term goals  Time Frame for Long term goals : NA due to short ELOS  If patient is discharged prior to progress note completion, this note is to serve as the discharge note with all goals being unmet unless indicated otherwise.

## 2019-06-22 NOTE — PLAN OF CARE
medications used for pain management along with scheduled Toradol. Patient also repositioned and resting for pain control as well. Patient satisfied and pain was able to be controlled with these measures throughout rest of shift to help achieve the patient's pain goal of 2. Problem: Neurological  Goal: Maximum potential motor/sensory/cognitive function  Outcome: Ongoing  Note:   Patient alert and oriented x 4. All extremities with pulses of +2. All extremities active with limited movement to left extremity due to hip surgery. Weight bearing as tolerated left leg. Problem: Cardiovascular  Goal: No DVT, peripheral vascular complications  Outcome: Ongoing  Note:   No signs and symptoms of DVT, soft calves. Patient wearing SCDs to help prevent DVTs. Problem: Respiratory  Goal: No pulmonary complications  Outcome: Ongoing  Note:   Lung sounds clear bilaterally. Chest expansion symmetrical. O2 sats remain greater than 90% on room air. Problem: GI  Goal: No bowel complications  Outcome: Ongoing  Note:   Bowel sounds active x 4 quadrants. Patient did not have bowel movement this shift. Problem:   Goal: Adequate urinary output  Outcome: Ongoing  Note:   Patient had adequate urinary output. Problem: Nutrition  Goal: Optimal nutrition therapy  Outcome: Ongoing  Note:   Patient tolerating diet well. On general diet. Problem: Skin Integrity/Risk  Goal: No skin breakdown during hospitalization  Outcome: Ongoing  Note:   Skin remains clean, dry and intact. No sign of new skin breakdown. Patient turns and repositioned on own and with staff reminders and encouragement to prevent skin breakdown. Problem: Musculor/Skeletal Functional Status  Goal: Highest potential functional level  Outcome: Ongoing  Note:   All extremities active with left extremity limited due to total hip surgery. Care plan reviewed with patient.   Patient verbalize understanding of the plan of care and contribute to goal

## 2019-06-22 NOTE — DISCHARGE SUMMARY
Orthopaedic Discharge Summary     Patient ID:  Dory Nick  [id number]  64 y.o.  1950    Admit date: 6/20/2019    Discharge date and time: 6/23/2019     Admitting Physician: Regina Land MD     Discharge Physician: Kristina Crum    Admission Diagnoses: Primary osteoarthritis of left hip [M16.12]    Discharge Diagnoses: Same    Admission Condition: stable    Discharged Condition: stable    Indication for Admission: Patient is a 68year old female with known history of OA of the left hip. Unfortunately her hip pain has been slowly worsening and conservative management has failed. She has elected to proceed with a total hip arthroplasty at this time. Patient medically optimized to proceed with surgery and had pre-op lab workup as well. Patient underwent left total hip arthroplasty on 8/93/7528 with complications. She has done well post-operatively and ready for discharge at this time. Surgical procedure: Left Total Hip Arthroplasty    Consults: Internal Medicine        Disposition: home    Patient Instructions:   Current Discharge Medication List      START taking these medications    Details   rivaroxaban (XARELTO) 10 MG TABS tablet Take 1 tablet by mouth every 24 hours  Qty: 18 tablet, Refills: 0         CONTINUE these medications which have CHANGED    Details   HYDROcodone-acetaminophen (NORCO) 5-325 MG per tablet Take 1-2 tablets by mouth every 4-6 hours as needed for Pain for up to 7 days.   Qty: 50 tablet, Refills: 0    Comments: Reduce doses taken as pain becomes manageable  Associated Diagnoses: Status post total replacement of left hip         CONTINUE these medications which have NOT CHANGED    Details   amLODIPine (NORVASC) 5 MG tablet Take 5 mg by mouth daily      aspirin 81 MG EC tablet Take 81 mg by mouth daily      atorvastatin (LIPITOR) 80 MG tablet Take 80 mg by mouth nightly      Calcium Carbonate-Vit D-Min (CALCIUM 600+D3 PLUS MINERALS PO) Take 1 tablet by mouth daily      carvedilol (COREG) 6.25 MG tablet Take 6.25 mg by mouth 2 times daily (with meals)      cyclobenzaprine (FLEXERIL) 10 MG tablet Take 20 mg by mouth nightly Takes 2 tabs at bedtime      docusate sodium (COLACE) 100 MG capsule Take 100 mg by mouth 2 times daily as needed for Constipation      Omega-3 Fatty Acids (FISH OIL) 1000 MG CAPS Take 1,000 mg by mouth daily      levothyroxine (SYNTHROID) 75 MCG tablet Take 75 mcg by mouth Daily      linaCLOtide (LINZESS) 72 MCG CAPS capsule Take 72 mcg by mouth every morning (before breakfast)      losartan (COZAAR) 50 MG tablet Take 50 mg by mouth 2 times daily      polyethylene glycol (GLYCOLAX) packet Take 17 g by mouth daily as needed for Constipation      Multiple Vitamins-Minerals (MULTIVITAMIN PO) Take 1 tablet by mouth daily      naproxen (NAPROSYN) 500 MG tablet Take 500 mg by mouth 2 times daily (with meals)      oxybutynin (DITROPAN XL) 15 MG extended release tablet Take 15 mg by mouth daily      pantoprazole (PROTONIX) 40 MG tablet Take 40 mg by mouth 2 times daily      PARoxetine (PAXIL) 20 MG tablet Take 20 mg by mouth every morning      rOPINIRole (REQUIP) 1 MG tablet Take 1 mg by mouth 2 tabs at bedtime and 1 tab through day prn      traZODone (DESYREL) 100 MG tablet Take 200 mg by mouth nightly      vitamin D (CHOLECALCIFEROL) 1000 units TABS tablet Take 1,000 Units by mouth daily      predniSONE (DELTASONE) 5 MG tablet Take 5 mg by mouth 2 times daily         STOP taking these medications       prednisoLONE 5 MG TABS Comments:   Reason for Stopping:             Activity: Weight-bearing as tolerated to the left leg, activity as tolerated  Diet: regular diet  Wound Care: keep wound clean and dry    Follow-up with Pedro in 8 weeks.     Signed:  Brianna Shell PA-C  6/22/2019  12:37 PM

## 2019-06-22 NOTE — PROGRESS NOTES
[address] [facility]  Occupational Therapy  Daily Note    Time In: 1440  Time Out: 1508  Timed Code Treatment Minutes: 28 Minutes  Minutes: 28          Date: 2019  Patient Name: Tavo Hancock,   Gender: female      Room: [facility]009-A  MRN: [id number]  : 1950  (68 y.o.)  Referring Practitioner: CINDY Olson CNP  Diagnosis: primary OA of left hip   Additional Pertinent Hx: Total hip arthroplasty on     Restrictions/Precautions:  Restrictions/Precautions: Weight Bearing, Fall Risk, Surgical Protocols  Left Lower Extremity Weight Bearing: Weight Bearing As Tolerated  Position Activity Restriction  Hip Precautions: No hip flexion > 90 degrees, No ADduction, Posterior hip precautions, No hip internal rotation  Other position/activity restrictions: WBAT left THR    SUBJECTIVE: Pt up in chair upon OT arrival, pt anxious about discharge d/t recent mold found in her home. Daughters present. Discussed continuum of care. Daughters wanting HH therapies instead of OP therapies at this time d/t the change in home dynamic. Notified RN. PAIN: reports needing a pain pill, however did not rate pain    COGNITION: anxious throughout session    ADL:   Toiletin [address]. Toilet Transfer: Contact Guard Assistance. Abiola Camara BALANCE:  Sitting Balance:  Supervision  Standing Balance: Contact Guard Assistance      TRANSFERS:  Sit to Stand:  Contact Guard Assistance. Stand to Sit: Contact Guard Assistance. FUNCTIONAL MOBILITY:  Assistive Device: Rolling Walker  Assist Level:  Contact Guard Assistance. Distance: To and from bathroom  Slow pace     ADDITIONAL ACTIVITIES:  Extensive education on [facility] therapy and continuum of care. ASSESSMENT:  Activity Tolerance:  Patient tolerance of  treatment: fair.        Discharge Recommendations: [address], 24 hour supervision or assist  Equipment Recommendations:    Plan: Times per week: 6x  Current Treatment Recommendations: Strengthening, Balance Training, Functional Mobility Training, Endurance Training, Patient/Caregiver Education & Training, Self-Care / ADL, Safety Education & Training    Patient Education  Patient Education: continuum of care    Goals  Short term goals  Time Frame for Short term goals: by discharge  Short term goal 1: Pt will complete LB ADLs with SBA and LHAE prn with no vc for hip precautions  Short term goal 2: Pt will complete dynamic standing task with S for > 3 minutes with no LOB or vc for hip precuations to increase ease with IADLs  Short term goal 3: Pt will complete functional mobility within environment with S and RW with no vc for hip precautions to incresae ease with kitchen mobility  Short term goal 4: Pt will complete simple IADL with S and no vc for hip precautions  Short term goal 5: Pt will demo indep with BUE strengthening HEP toincrease UB strength and activity tolerance needed for ADLs  Long term goals  Time Frame for Long term goals : No LTG established d/t short ELOS    Following session, patient left in safe position with all fall risk precautions in place.

## 2019-06-22 NOTE — PROGRESS NOTES
distention. Trachea midline. Respiratory:  Normal respiratory effort. Clear to auscultation, bilaterally without Rales/Wheezes/Rhonchi. Cardiovascular: Regular rate and rhythm with normal S1/S2 without murmurs, rubs or gallops. Abdomen: Soft, non-tender, non-distended with normal bowel sounds. Musculoskeletal: passive and active ROM x 4 extremities. Skin: Skin color, texture, turgor normal.  No rashes or lesions. Neurologic:  Neurovascularly intact without any focal sensory/motor deficits. Cranial nerves: II-XII intact, grossly non-focal.  Psychiatric: Alert and oriented, thought content appropriate, normal insight  Capillary Refill: Brisk,< 3 seconds   Peripheral Pulses: +2 palpable, equal bilaterally       Labs:   Recent Labs     06/21/19  0611 06/22/19  0548   WBC 16.3* 11.2*   HGB 9.5* 7.9*   HCT 31.0* 26.5*    278     Recent Labs     06/21/19  0611 06/22/19  0548    144   K 4.1 4.0    114*   CO2 22* 22*   BUN 16 11   CREATININE 0.9 0.7   CALCIUM 8.6 8.3*     No results for input(s): AST, ALT, BILIDIR, BILITOT, ALKPHOS in the last 72 hours. No results for input(s): INR in the last 72 hours. No results for input(s): Madalynn Jeff in the last 72 hours. Urinalysis:    No results found for: Roge Knock, BACTERIA, RBCUA, BLOODU, SPECGRAV, [address]    Radiology:  XR HIP LEFT (2-3 VIEWS)   Final Result   Postop appearance left hip. **This report has been created using voice recognition software. It may contain minor errors which are inherent in voice recognition technology. **      Final report electronically signed by Dr. Sidney Cabrera on 6/20/2019 10:52 AM          Diet: DIET GENERAL;    Code Status: Full Code    Assessment/Plan:    Anticipated Discharge in : recommend to monitor overnight     Active Hospital Problems    Diagnosis Date Noted    Essential hypertension [I10] 06/21/2019    Primary osteoarthritis of left hip [M16.12] 06/20/2019    Assessment/Plan:     1. Left DEVANTE - primary to manage. 2. Hypertension - home medications resumed.  Blood pressures have been well controlled. 3. Hypothyroidism - continue synthroid 75 mcg daily   4. Depression. Cont paxil and trazodone    5. Acute Blood Loss Anemia. Hemoglobin dropped from 9.5 to 7.9. No melena, hematochezia. EBL 100cc. On Eliquis. Will recommend to monitor overnight.  Transfuse if Hb<7.       Will recommend to monitor overnight, if hemoglobin stable by tomorrow can discharge.                Electronically signed by Omaira Guzman MD on 6/22/2019 at 9:03 AM

## 2019-06-22 NOTE — PLAN OF CARE
Problem: Pain:  Goal: Pain level will decrease  Description  Pain level will decrease  6/22/2019 1837 by Lita Delgadillo RN  Outcome: Ongoing  Note:   Pt report pain at 3-8 on scale. Pt states oral medication helping to achieve pain goal of a 5 on scale. Problem: Pain:  Goal: Control of acute pain  Description  Control of acute pain  6/22/2019 1837 by Lita Delgadillo RN  Outcome: Ongoing  Note:   Pt report pain at 3-8 on scale. Pt states oral medication helping to achieve pain goal of a 5 on scale. Problem: Pain:  Goal: Control of chronic pain  Description  Control of chronic pain  6/22/2019 1837 by Lita Delgadillo RN  Outcome: Ongoing  Note:   Pt report pain at 3-8 on scale. Pt states oral medication helping to achieve pain goal of a 5 on scale. Problem: Falls - Risk of:  Goal: Will remain free from falls  Description  Will remain free from falls  6/22/2019 1837 by Lita Delgadillo RN  Outcome: Ongoing  Note:   Pt using call light appropriately to call for assistance with ambulation to the bathroom and to chair. Pt is also compliant with use of non-skid slippers. Pt reports understanding of fall prevention when discussed. Problem: Falls - Risk of:  Goal: Absence of physical injury  Description  Absence of physical injury  6/22/2019 1837 by Lita Delgadillo RN  Outcome: Ongoing  Note:   Pt using call light appropriately to call for assistance with ambulation to the bathroom and to chair. Pt is also compliant with use of non-skid slippers. Pt reports understanding of fall prevention when discussed. Problem: Discharge Planning:  Goal: Patients continuum of care needs are met  Description  Patients continuum of care needs are met  6/22/2019 1837 by Lita Delgadillo RN  Outcome: Ongoing  Note:   Pt plans home at discharge. Care manager and social working helping with discharge needs.        Problem: Pain Control  Goal: Maintain pain level at or below patient's acceptable level (or 5 if patient is unable to determine acceptable level)  6/22/2019 1837 by Raudel Hansen RN  Outcome: Ongoing  Note:   Pt report pain at 3-8 on scale. Pt states oral medication helping to achieve pain goal of a 5 on scale. Problem: Neurological  Goal: Maximum potential motor/sensory/cognitive function  6/22/2019 1837 by Raudel Hansen RN  Outcome: Ongoing  Note:   Eating independently at meals and able to eat adequate amounts. Problem: Cardiovascular  Goal: No DVT, peripheral vascular complications  3/72/3137 1837 by Raudel Hansen RN  Outcome: Ongoing  Note:   Pt without s/s of DVT. Pt able to take prescribed anticoagulants and SCD,S in place to help prevent development of DVT. Problem: Respiratory  Goal: No pulmonary complications  9/51/1966 1837 by Raudel Hansen RN  Outcome: Ongoing  Note:   Pt sats >90% on room air. No shortness of breath noted. Lungs clear, uses IS, and able to C&DB  as ordered. Problem: GI  Goal: No bowel complications  4/90/2103 1837 by Raudel Hansen RN  Outcome: Ongoing  Note:   Pt with bowel sounds, passing flatus, and without nausea. Taking prescribed medications to assist with BM      Problem:   Goal: Adequate urinary output  6/22/2019 1837 by Raudel Hansen RN  Outcome: Ongoing  Note:   Pt voiding adequate amounts without difficulty. Problem: Nutrition  Goal: Optimal nutrition therapy  6/22/2019 1837 by Raudel Hansen RN  Outcome: Ongoing  Note:   Eating independently at meals and able to eat adequate amounts. Problem: Skin Integrity/Risk  Goal: No skin breakdown during hospitalization  6/22/2019 1837 by Raudel Hansen RN  Outcome: Ongoing  Note:   Pt's surgical incision healing. Dressing is dry and intact and not due to be changed today. No other skin impairments noted. Pt understands the importance of frequent repositioning in order to prevent any skin breakdown.       Problem: Musculor/Skeletal Functional Status  Goal: Highest potential functional level  6/22/2019 1837 by Rod Isaac RN  Outcome: Ongoing  Note:   Up with 1 assist, walker, and gait belt. Requires assistance to mobilize out of bed, chair and to bathroom. Independent once set up for meals and bathing.

## 2019-06-22 NOTE — PROGRESS NOTES
Orthopaedic Progress Note      SUBJECTIVE   Ms. Dino Robles is post op day # 2     Patient s/p left total hip arthroplasty. Patient seen resting comfortably in chair eating her lunch. States pain is improving, really only worsens with therapy. No new concerns. Hoping to go home tomorrow with her sister. OBJECTIVE      Physical    VITALS:  BP (!) 104/57   Pulse 90   Temp 98.9 °F (37.2 °C) (Oral)   Resp 18   Ht 5' 2\" (1.575 m)   Wt 130 lb 8.2 oz (59.2 kg)   SpO2 90%   BMI 23.87 kg/m²   I/O last 3 completed shifts: In: 9715 [P. O.:680; I.V.:2574]  Out: 2700 [Urine:2700]      LLE:  Prinio dressing in place with no signs of erythema, infections, or drainage noted. ROM knee, ankle, toes intact without difficulty or pain. Calf soft nontender to palpation. NVI, no numbness/tingling. Intact dorsalis pedal pulse.       Data  CBC:   Lab Results   Component Value Date    WBC 11.2 06/22/2019    HGB 7.9 06/22/2019     06/22/2019     BMP:    Lab Results   Component Value Date     06/22/2019    K 4.0 06/22/2019     06/22/2019    CO2 22 06/22/2019    BUN 11 06/22/2019    CREATININE 0.7 06/22/2019    CALCIUM 8.3 06/22/2019    GLUCOSE 100 06/22/2019     Uric Acid:  No components found for: URIC  PT/INR:  No results found for: PROTIME, INR  Troponin:  No results found for: TROPONINI  Urine Culture:  No components found for: CURINE      Current Inpatient Medications    Current Facility-Administered Medications: 0.9 % sodium chloride infusion, , Intravenous, Continuous  morphine (PF) injection 2 mg, 2 mg, Intravenous, Q2H PRN **OR** morphine (PF) injection 4 mg, 4 mg, Intravenous, Q2H PRN  HYDROcodone-acetaminophen (NORCO) 5-325 MG per tablet 1 tablet, 1 tablet, Oral, Q4H PRN **OR** HYDROcodone-acetaminophen (NORCO) 5-325 MG per tablet 2 tablet, 2 tablet, Oral, Q4H PRN  cyclobenzaprine (FLEXERIL) tablet 10 mg, 10 mg, Oral, TID PRN  ketorolac (TORADOL) injection 15 mg, 15 mg, Intravenous, Q6H  [facility]ansetron [facility]) injection 4 mg, 4 mg, Intravenous, Q6H PRN  rivaroxaban (XARELTO) tablet 10 mg, 10 mg, Oral, Daily  docusate sodium (COLACE) capsule 100 mg, 100 mg, Oral, Daily  magnesium hydroxide (MILK OF MAGNESIA) 400 MG/5ML suspension 30 mL, 30 mL, Oral, Daily PRN  acetaminophen (TYLENOL) tablet 650 mg, 650 mg, Oral, Q4H PRN  amLODIPine (NORVASC) tablet 5 mg, 5 mg, Oral, Daily  atorvastatin (LIPITOR) tablet 80 mg, 80 mg, Oral, Nightly  carvedilol (COREG) tablet 6.25 mg, 6.25 mg, Oral, BID WC  levothyroxine (SYNTHROID) tablet 75 mcg, 75 mcg, Oral, Daily  linaCLOtide (LINZESS) capsule 72 mcg, 72 mcg, Oral, QAM AC  losartan (COZAAR) tablet 50 mg, 50 mg, Oral, BID  omega-3 acid ethyl esters (LOVAZA) capsule 1,000 mg, 1,000 mg, Oral, Daily  oxybutynin (DITROPAN-XL) extended release tablet 15 mg, 15 mg, Oral, Daily  pantoprazole (PROTONIX) tablet 40 mg, 40 mg, Oral, BID  PARoxetine (PAXIL) tablet 20 mg, 20 mg, Oral, QAM  polyethylene glycol (GLYCOLAX) packet 17 g, 17 g, Oral, Daily PRN  predniSONE (DELTASONE) tablet 5 mg, 5 mg, Oral, BID  rOPINIRole (REQUIP) tablet 2 mg, 2 mg, Oral, Nightly  traZODone (DESYREL) tablet 200 mg, 200 mg, Oral, Nightly        PLAN    1)  Cont with current medical management  2)  WBAT LLE, activity as tolerated  3)  Dry dressing changes PRN  4)  Therapy to cont with treatment  5)  Plan on discharge to home tomorrow    Jim Tineo PA-C

## 2019-06-23 VITALS
OXYGEN SATURATION: 94 % | RESPIRATION RATE: 16 BRPM | DIASTOLIC BLOOD PRESSURE: 96 MMHG | SYSTOLIC BLOOD PRESSURE: 125 MMHG | BODY MASS INDEX: 24.02 KG/M2 | TEMPERATURE: 99.1 F | WEIGHT: 130.51 LBS | HEART RATE: 101 BPM | HEIGHT: 62 IN

## 2019-06-23 LAB
ANION GAP SERPL CALCULATED.3IONS-SCNC: 14 MEQ/L (ref 8–16)
BASOPHILS # BLD: 0.3 %
BASOPHILS ABSOLUTE: 0 THOU/MM3 (ref 0–0.1)
BUN BLDV-MCNC: 13 MG/DL (ref 7–22)
CALCIUM SERPL-MCNC: 8.9 MG/DL (ref 8.5–10.5)
CHLORIDE BLD-SCNC: 108 MEQ/L (ref 98–111)
CO2: 24 MEQ/L (ref 23–33)
CREAT SERPL-MCNC: 0.7 MG/DL (ref 0.4–1.2)
EOSINOPHIL # BLD: 1.5 %
EOSINOPHILS ABSOLUTE: 0.2 THOU/MM3 (ref 0–0.4)
ERYTHROCYTE [DISTWIDTH] IN BLOOD BY AUTOMATED COUNT: 14.5 % (ref 11.5–14.5)
ERYTHROCYTE [DISTWIDTH] IN BLOOD BY AUTOMATED COUNT: 55 FL (ref 35–45)
GFR SERPL CREATININE-BSD FRML MDRD: 82 ML/MIN/1.73M2
GLUCOSE BLD-MCNC: 108 MG/DL (ref 70–108)
HCT VFR BLD CALC: 26.7 % (ref 37–47)
HEMOGLOBIN: 8.5 GM/DL (ref 12–16)
IMMATURE GRANS (ABS): 0.09 THOU/MM3 (ref 0–0.07)
IMMATURE GRANULOCYTES: 0.8 %
LYMPHOCYTES # BLD: 17 %
LYMPHOCYTES ABSOLUTE: 1.9 THOU/MM3 (ref 1–4.8)
MCH RBC QN AUTO: 32.9 PG (ref 26–33)
MCHC RBC AUTO-ENTMCNC: 31.8 GM/DL (ref 32.2–35.5)
MCV RBC AUTO: 103.5 FL (ref 81–99)
MONOCYTES # BLD: 7.1 %
MONOCYTES ABSOLUTE: 0.8 THOU/MM3 (ref 0.4–1.3)
NUCLEATED RED BLOOD CELLS: 0 /100 WBC
PLATELET # BLD: 297 THOU/MM3 (ref 130–400)
PMV BLD AUTO: 9.1 FL (ref 9.4–12.4)
POTASSIUM SERPL-SCNC: 4.3 MEQ/L (ref 3.5–5.2)
RBC # BLD: 2.58 MILL/MM3 (ref 4.2–5.4)
SEG NEUTROPHILS: 73.3 %
SEGMENTED NEUTROPHILS ABSOLUTE COUNT: 8.1 THOU/MM3 (ref 1.8–7.7)
SODIUM BLD-SCNC: 146 MEQ/L (ref 135–145)
WBC # BLD: 11 THOU/MM3 (ref 4.8–10.8)

## 2019-06-23 PROCEDURE — 6370000000 HC RX 637 (ALT 250 FOR IP): Performed by: NURSE PRACTITIONER

## 2019-06-23 PROCEDURE — 80048 BASIC METABOLIC PNL TOTAL CA: CPT

## 2019-06-23 PROCEDURE — 6370000000 HC RX 637 (ALT 250 FOR IP): Performed by: INTERNAL MEDICINE

## 2019-06-23 PROCEDURE — 85025 COMPLETE CBC W/AUTO DIFF WBC: CPT

## 2019-06-23 PROCEDURE — 36415 COLL VENOUS BLD VENIPUNCTURE: CPT

## 2019-06-23 PROCEDURE — 99232 SBSQ HOSP IP/OBS MODERATE 35: CPT | Performed by: INTERNAL MEDICINE

## 2019-06-23 RX ADMIN — HYDROCODONE BITARTRATE AND ACETAMINOPHEN 2 TABLET: 5; 325 TABLET ORAL at 14:17

## 2019-06-23 RX ADMIN — PAROXETINE HYDROCHLORIDE 20 MG: 20 TABLET, FILM COATED ORAL at 09:08

## 2019-06-23 RX ADMIN — LOSARTAN POTASSIUM 50 MG: 50 TABLET, FILM COATED ORAL at 09:08

## 2019-06-23 RX ADMIN — HYDROCODONE BITARTRATE AND ACETAMINOPHEN 2 TABLET: 5; 325 TABLET ORAL at 04:07

## 2019-06-23 RX ADMIN — CARVEDILOL 6.25 MG: 6.25 TABLET, FILM COATED ORAL at 09:08

## 2019-06-23 RX ADMIN — OMEGA-3-ACID ETHYL ESTERS 1000 MG: 1 CAPSULE, LIQUID FILLED ORAL at 09:08

## 2019-06-23 RX ADMIN — HYDROCODONE BITARTRATE AND ACETAMINOPHEN 2 TABLET: 5; 325 TABLET ORAL at 00:02

## 2019-06-23 RX ADMIN — MAGNESIUM HYDROXIDE 30 ML: 400 SUSPENSION ORAL at 09:07

## 2019-06-23 RX ADMIN — HYDROCODONE BITARTRATE AND ACETAMINOPHEN 2 TABLET: 5; 325 TABLET ORAL at 09:08

## 2019-06-23 RX ADMIN — OXYBUTYNIN CHLORIDE 15 MG: 10 TABLET, EXTENDED RELEASE ORAL at 09:08

## 2019-06-23 RX ADMIN — PREDNISONE 5 MG: 5 TABLET ORAL at 09:07

## 2019-06-23 RX ADMIN — POLYETHYLENE GLYCOL 3350 17 G: 17 POWDER, FOR SOLUTION ORAL at 09:07

## 2019-06-23 RX ADMIN — LEVOTHYROXINE SODIUM 75 MCG: 75 TABLET ORAL at 09:08

## 2019-06-23 RX ADMIN — DOCUSATE SODIUM 100 MG: 100 CAPSULE, LIQUID FILLED ORAL at 09:08

## 2019-06-23 RX ADMIN — AMLODIPINE BESYLATE 5 MG: 5 TABLET ORAL at 09:08

## 2019-06-23 RX ADMIN — PANTOPRAZOLE SODIUM 40 MG: 40 TABLET, DELAYED RELEASE ORAL at 09:08

## 2019-06-23 ASSESSMENT — PAIN DESCRIPTION - PROGRESSION: CLINICAL_PROGRESSION: GRADUALLY IMPROVING

## 2019-06-23 ASSESSMENT — PAIN DESCRIPTION - DIRECTION: RADIATING_TOWARDS: LEFT THIGH

## 2019-06-23 ASSESSMENT — PAIN DESCRIPTION - FREQUENCY: FREQUENCY: CONTINUOUS

## 2019-06-23 ASSESSMENT — PAIN SCALES - GENERAL
PAINLEVEL_OUTOF10: 2
PAINLEVEL_OUTOF10: 7
PAINLEVEL_OUTOF10: 3
PAINLEVEL_OUTOF10: 6

## 2019-06-23 ASSESSMENT — PAIN DESCRIPTION - LOCATION: LOCATION: HIP

## 2019-06-23 ASSESSMENT — PAIN DESCRIPTION - DESCRIPTORS: DESCRIPTORS: SORE

## 2019-06-23 ASSESSMENT — PAIN - FUNCTIONAL ASSESSMENT: PAIN_FUNCTIONAL_ASSESSMENT: PREVENTS OR INTERFERES SOME ACTIVE ACTIVITIES AND ADLS

## 2019-06-23 ASSESSMENT — PAIN DESCRIPTION - PAIN TYPE: TYPE: SURGICAL PAIN

## 2019-06-23 ASSESSMENT — PAIN DESCRIPTION - ORIENTATION: ORIENTATION: LEFT

## 2019-06-23 ASSESSMENT — PAIN DESCRIPTION - ONSET: ONSET: ON-GOING

## 2019-06-23 NOTE — PLAN OF CARE
Problem: Falls - Risk of:  Goal: Will remain free from falls  Description  Will remain free from falls  6/23/2019 0508 by Nirmala Garcia RN  Outcome: Met This Shift  Note:   Patient has not had any falls during this shift. Bed in lowest position with wheels locked and call light in reach. Patient compliant with using call light to request assistance from staff when needed. Fall prevention measures in place and patient compliant. Hourly rounding in place and bed alarm on. Problem: Cardiovascular  Goal: No DVT, peripheral vascular complications  0/23/7850 0508 by Nirmala Garcia RN  Outcome: Met This Shift  Note:   No signs or symptoms of DVT noted to patient this shift. Negative Alexa's sign bialterally. No redness, swelling, or warmth noted to bilateral calves. Patient compliant with wearing SCDs while in bed. Will continue to monitor. Problem: Nutrition  Goal: Optimal nutrition therapy  6/23/2019 0508 by Nirmala Garcia RN  Outcome: Met This Shift  Note:   Patient on general diet and tolerating well. No nausea/vomiting noted to patient this shift. Problem: Skin Integrity/Risk  Goal: No skin breakdown during hospitalization  6/23/2019 0508 by Nirmala Garcia RN  Outcome: Met This Shift  Note:   No skin breakdown noted to patient this shift. Patient is able to reposition herself in bed in order to prevent skin breakdown      Problem: Discharge Planning:  Goal: Patients continuum of care needs are met  Description  Patients continuum of care needs are met  6/23/2019 0508 by Nirmala Garcia RN  Outcome: Ongoing  Note:   Patient planning home at discharge.  assisting patient with discharge needs.       Problem: Pain Control  Goal: Maintain pain level at or below patient's acceptable level (or 5 if patient is unable to determine acceptable level)  6/23/2019 0508 by Nirmala Garcia RN  Outcome: Ongoing  Flowsheets (Taken 6/23/2019 0508)  Patient's Stated Pain Goal: 2  Note:

## 2019-06-23 NOTE — PROGRESS NOTES
Hospitalist Progress Note    Patient:  Kirk Bruce      Unit/Bed:7K-09/009-A    YOB: 1950    MRN: [id number]       Acct: [de-identified]     PCP: Antonio Reynoso MD    Date of Admission: 6/20/2019    Chief Complaint: medical management of HTN and HLD     Hospital Course:  Consulted for med management of chronic problems - hypertension, hyperlipidemia, hypothyroidism. Pt is POD 2 of left DEVANTE. Doing well.     Subjective: no acute events overnight. No melena, hematochezia. Medications:  Reviewed    Infusion Medications    sodium chloride 75 mL/hr at 06/22/19 1238     Scheduled Medications    rivaroxaban  10 mg Oral Daily    docusate sodium  100 mg Oral Daily    amLODIPine  5 mg Oral Daily    atorvastatin  80 mg Oral Nightly    carvedilol  6.25 mg Oral BID WC    levothyroxine  75 mcg Oral Daily    linaCLOtide  72 mcg Oral QAM AC    losartan  50 mg Oral BID    omega-3 acid ethyl esters  1,000 mg Oral Daily    oxybutynin  15 mg Oral Daily    pantoprazole  40 mg Oral BID    PARoxetine  20 mg Oral QAM    predniSONE  5 mg Oral BID    rOPINIRole  2 mg Oral Nightly    traZODone  200 mg Oral Nightly     PRN Meds: morphine **OR** morphine, HYDROcodone 5 mg - acetaminophen **OR** HYDROcodone 5 mg - acetaminophen, cyclobenzaprine, ondansetron, magnesium hydroxide, acetaminophen, polyethylene glycol      Intake/Output Summary (Last 24 hours) at 6/23/2019 0804  Last data filed at 6/23/2019 0206  Gross per 24 hour   Intake 2333. 59 ml   Output 2750 ml   Net -416.41 ml       Diet:  DIET GENERAL;    Exam:  /63   Pulse 79   Temp 98.8 °F (37.1 °C) (Oral)   Resp 18   Ht 5' 2\" (1.575 m)   Wt 130 lb 8.2 oz (59.2 kg)   SpO2 94%   BMI 23.87 kg/m²     General appearance: No apparent distress, appears stated age and cooperative. HEENT: Pupils equal, round, and reactive to light. Conjunctivae/corneas clear. Neck: Supple, with full range of motion. No jugular venous distention.  Trachea [x] Home       [] TCU       [] Rehab       [] Psych       [] SNF       [] [place]       [] Other-    Code Status: Full Code    Assessment/Plan:    Anticipated Discharge in : today     Active Hospital Problems    Diagnosis Date Noted    Essential hypertension [I10] 06/21/2019    Primary osteoarthritis of left hip [M16.12] 06/20/2019            Assessment/Plan:     1. Left DEVANTE - primary to manage. 2. Hypertension - home medications resumed.  Blood pressures have been well controlled. 3. Hypothyroidism - continue synthroid 75 mcg daily   4. Depression. Cont paxil and trazodone    5. Acute Blood Loss Anemia. Hemoglobin dropped from 9.5 to 7.9. Up to 8.5 today. No melena, hematochezia. EBL 100cc. On Eliquis. Hemoglobin stable. Pt stable for discharge      We will respectfully sign off. Pt will need to get CBC checked as outpatient.            Electronically signed by Susan Perdomo MD on 6/23/2019 at 8:04 AM

## 2019-06-25 ENCOUNTER — APPOINTMENT (OUTPATIENT)
Dept: CT IMAGING | Age: 69
DRG: 291 | End: 2019-06-25
Payer: MEDICARE

## 2019-06-25 ENCOUNTER — HOSPITAL ENCOUNTER (INPATIENT)
Age: 69
LOS: 3 days | Discharge: HOME HEALTH | DRG: 291 | End: 2019-06-29
Attending: FAMILY MEDICINE | Admitting: INTERNAL MEDICINE
Payer: MEDICARE

## 2019-06-25 ENCOUNTER — APPOINTMENT (OUTPATIENT)
Dept: INTERVENTIONAL RADIOLOGY/VASCULAR | Age: 69
DRG: 291 | End: 2019-06-25
Payer: MEDICARE

## 2019-06-25 ENCOUNTER — APPOINTMENT (OUTPATIENT)
Dept: GENERAL RADIOLOGY | Age: 69
DRG: 291 | End: 2019-06-25
Payer: MEDICARE

## 2019-06-25 DIAGNOSIS — I50.9 NEW ONSET OF CONGESTIVE HEART FAILURE (HCC): Primary | ICD-10-CM

## 2019-06-25 DIAGNOSIS — I25.10 CORONARY ARTERY DISEASE INVOLVING NATIVE CORONARY ARTERY OF NATIVE HEART WITHOUT ANGINA PECTORIS: Chronic | ICD-10-CM

## 2019-06-25 DIAGNOSIS — R09.02 HYPOXIA: ICD-10-CM

## 2019-06-25 DIAGNOSIS — Z96.642 STATUS POST TOTAL REPLACEMENT OF LEFT HIP: ICD-10-CM

## 2019-06-25 DIAGNOSIS — I50.21 ACUTE SYSTOLIC CHF (CONGESTIVE HEART FAILURE) (HCC): ICD-10-CM

## 2019-06-25 PROBLEM — R60.0 BILATERAL LOWER EXTREMITY EDEMA: Status: ACTIVE | Noted: 2019-06-25

## 2019-06-25 PROBLEM — R06.02 SHORTNESS OF BREATH: Status: ACTIVE | Noted: 2019-06-25

## 2019-06-25 LAB
ALBUMIN SERPL-MCNC: 3.5 G/DL (ref 3.5–5.1)
ALP BLD-CCNC: 57 U/L (ref 38–126)
ALT SERPL-CCNC: 24 U/L (ref 11–66)
ANION GAP SERPL CALCULATED.3IONS-SCNC: 12 MEQ/L (ref 8–16)
AST SERPL-CCNC: 38 U/L (ref 5–40)
BASOPHILS # BLD: 0.3 %
BASOPHILS ABSOLUTE: 0 THOU/MM3 (ref 0–0.1)
BILIRUB SERPL-MCNC: 0.3 MG/DL (ref 0.3–1.2)
BUN BLDV-MCNC: 20 MG/DL (ref 7–22)
CALCIUM SERPL-MCNC: 9.8 MG/DL (ref 8.5–10.5)
CHLORIDE BLD-SCNC: 104 MEQ/L (ref 98–111)
CO2: 23 MEQ/L (ref 23–33)
CREAT SERPL-MCNC: 0.7 MG/DL (ref 0.4–1.2)
EOSINOPHIL # BLD: 0.5 %
EOSINOPHILS ABSOLUTE: 0 THOU/MM3 (ref 0–0.4)
ERYTHROCYTE [DISTWIDTH] IN BLOOD BY AUTOMATED COUNT: 14.4 % (ref 11.5–14.5)
ERYTHROCYTE [DISTWIDTH] IN BLOOD BY AUTOMATED COUNT: 53.1 FL (ref 35–45)
GFR SERPL CREATININE-BSD FRML MDRD: 82 ML/MIN/1.73M2
GLUCOSE BLD-MCNC: 127 MG/DL (ref 70–108)
HCT VFR BLD CALC: 28 % (ref 37–47)
HEMOGLOBIN: 8.9 GM/DL (ref 12–16)
IMMATURE GRANS (ABS): 0.09 THOU/MM3 (ref 0–0.07)
IMMATURE GRANULOCYTES: 1 %
LYMPHOCYTES # BLD: 18.1 %
LYMPHOCYTES ABSOLUTE: 1.7 THOU/MM3 (ref 1–4.8)
MCH RBC QN AUTO: 32.4 PG (ref 26–33)
MCHC RBC AUTO-ENTMCNC: 31.8 GM/DL (ref 32.2–35.5)
MCV RBC AUTO: 101.8 FL (ref 81–99)
MONOCYTES # BLD: 5.7 %
MONOCYTES ABSOLUTE: 0.5 THOU/MM3 (ref 0.4–1.3)
NUCLEATED RED BLOOD CELLS: 0 /100 WBC
OSMOLALITY CALCULATION: 281.7 MOSMOL/KG (ref 275–300)
PLATELET # BLD: 439 THOU/MM3 (ref 130–400)
PMV BLD AUTO: 8.8 FL (ref 9.4–12.4)
POTASSIUM SERPL-SCNC: 4.4 MEQ/L (ref 3.5–5.2)
PRO-BNP: 2710 PG/ML (ref 0–900)
RBC # BLD: 2.75 MILL/MM3 (ref 4.2–5.4)
SEG NEUTROPHILS: 74.4 %
SEGMENTED NEUTROPHILS ABSOLUTE COUNT: 6.8 THOU/MM3 (ref 1.8–7.7)
SODIUM BLD-SCNC: 139 MEQ/L (ref 135–145)
TOTAL PROTEIN: 7 G/DL (ref 6.1–8)
TROPONIN T: < 0.01 NG/ML
WBC # BLD: 9.2 THOU/MM3 (ref 4.8–10.8)

## 2019-06-25 PROCEDURE — 2580000003 HC RX 258: Performed by: PHYSICIAN ASSISTANT

## 2019-06-25 PROCEDURE — 71045 X-RAY EXAM CHEST 1 VIEW: CPT

## 2019-06-25 PROCEDURE — 93971 EXTREMITY STUDY: CPT

## 2019-06-25 PROCEDURE — 6370000000 HC RX 637 (ALT 250 FOR IP): Performed by: PHYSICIAN ASSISTANT

## 2019-06-25 PROCEDURE — 71275 CT ANGIOGRAPHY CHEST: CPT

## 2019-06-25 PROCEDURE — 99220 PR INITIAL OBSERVATION CARE/DAY 70 MINUTES: CPT | Performed by: PHYSICIAN ASSISTANT

## 2019-06-25 PROCEDURE — 80053 COMPREHEN METABOLIC PANEL: CPT

## 2019-06-25 PROCEDURE — 6360000002 HC RX W HCPCS: Performed by: PHYSICIAN ASSISTANT

## 2019-06-25 PROCEDURE — 2500000003 HC RX 250 WO HCPCS: Performed by: FAMILY MEDICINE

## 2019-06-25 PROCEDURE — 83880 ASSAY OF NATRIURETIC PEPTIDE: CPT

## 2019-06-25 PROCEDURE — G0378 HOSPITAL OBSERVATION PER HR: HCPCS

## 2019-06-25 PROCEDURE — 6360000004 HC RX CONTRAST MEDICATION: Performed by: FAMILY MEDICINE

## 2019-06-25 PROCEDURE — 96375 TX/PRO/DX INJ NEW DRUG ADDON: CPT

## 2019-06-25 PROCEDURE — 85025 COMPLETE CBC W/AUTO DIFF WBC: CPT

## 2019-06-25 PROCEDURE — 36415 COLL VENOUS BLD VENIPUNCTURE: CPT

## 2019-06-25 PROCEDURE — 93005 ELECTROCARDIOGRAM TRACING: CPT | Performed by: FAMILY MEDICINE

## 2019-06-25 PROCEDURE — 96374 THER/PROPH/DIAG INJ IV PUSH: CPT

## 2019-06-25 PROCEDURE — 84484 ASSAY OF TROPONIN QUANT: CPT

## 2019-06-25 PROCEDURE — 99285 EMERGENCY DEPT VISIT HI MDM: CPT

## 2019-06-25 RX ORDER — ROPINIROLE 1 MG/1
1 TABLET, FILM COATED ORAL DAILY
Status: DISCONTINUED | OUTPATIENT
Start: 2019-06-26 | End: 2019-06-29 | Stop reason: HOSPADM

## 2019-06-25 RX ORDER — CALCIUM CARBONATE 500(1250)
1 TABLET ORAL 2 TIMES DAILY
Status: DISCONTINUED | OUTPATIENT
Start: 2019-06-25 | End: 2019-06-29 | Stop reason: HOSPADM

## 2019-06-25 RX ORDER — ASPIRIN 81 MG/1
81 TABLET ORAL DAILY
Status: DISCONTINUED | OUTPATIENT
Start: 2019-06-26 | End: 2019-06-29 | Stop reason: HOSPADM

## 2019-06-25 RX ORDER — CARVEDILOL 6.25 MG/1
6.25 TABLET ORAL 2 TIMES DAILY WITH MEALS
Status: DISCONTINUED | OUTPATIENT
Start: 2019-06-26 | End: 2019-06-26

## 2019-06-25 RX ORDER — ROPINIROLE 1 MG/1
1 TABLET, FILM COATED ORAL 3 TIMES DAILY
Status: DISCONTINUED | OUTPATIENT
Start: 2019-06-25 | End: 2019-06-25

## 2019-06-25 RX ORDER — PANTOPRAZOLE SODIUM 40 MG/1
40 TABLET, DELAYED RELEASE ORAL DAILY
Status: DISCONTINUED | OUTPATIENT
Start: 2019-06-26 | End: 2019-06-27

## 2019-06-25 RX ORDER — CARVEDILOL 3.12 MG/1
3.12 TABLET ORAL 2 TIMES DAILY WITH MEALS
Status: DISCONTINUED | OUTPATIENT
Start: 2019-06-26 | End: 2019-06-25

## 2019-06-25 RX ORDER — LOSARTAN POTASSIUM 50 MG/1
50 TABLET ORAL 2 TIMES DAILY
Status: DISCONTINUED | OUTPATIENT
Start: 2019-06-25 | End: 2019-06-29 | Stop reason: HOSPADM

## 2019-06-25 RX ORDER — CYCLOBENZAPRINE HCL 10 MG
10 TABLET ORAL DAILY
Status: DISCONTINUED | OUTPATIENT
Start: 2019-06-26 | End: 2019-06-29 | Stop reason: HOSPADM

## 2019-06-25 RX ORDER — LANOLIN ALCOHOL/MO/W.PET/CERES
10 CREAM (GRAM) TOPICAL NIGHTLY PRN
Status: DISCONTINUED | OUTPATIENT
Start: 2019-06-26 | End: 2019-06-26 | Stop reason: SDUPTHER

## 2019-06-25 RX ORDER — PHENOL 1.4 %
10 AEROSOL, SPRAY (ML) MUCOUS MEMBRANE NIGHTLY PRN
COMMUNITY

## 2019-06-25 RX ORDER — AMLODIPINE BESYLATE 5 MG/1
5 TABLET ORAL DAILY
Status: DISCONTINUED | OUTPATIENT
Start: 2019-06-26 | End: 2019-06-26

## 2019-06-25 RX ORDER — FLUTICASONE PROPIONATE 50 MCG
1 SPRAY, SUSPENSION (ML) NASAL DAILY
Status: DISCONTINUED | OUTPATIENT
Start: 2019-06-26 | End: 2019-06-29 | Stop reason: HOSPADM

## 2019-06-25 RX ORDER — ATORVASTATIN CALCIUM 40 MG/1
40 TABLET, FILM COATED ORAL NIGHTLY
Status: DISCONTINUED | OUTPATIENT
Start: 2019-06-25 | End: 2019-06-29 | Stop reason: HOSPADM

## 2019-06-25 RX ORDER — TRAZODONE HYDROCHLORIDE 100 MG/1
200 TABLET ORAL NIGHTLY
Status: DISCONTINUED | OUTPATIENT
Start: 2019-06-26 | End: 2019-06-29 | Stop reason: HOSPADM

## 2019-06-25 RX ORDER — LANOLIN ALCOHOL/MO/W.PET/CERES
9 CREAM (GRAM) TOPICAL NIGHTLY PRN
Status: DISCONTINUED | OUTPATIENT
Start: 2019-06-26 | End: 2019-06-29 | Stop reason: HOSPADM

## 2019-06-25 RX ORDER — DOCUSATE SODIUM 100 MG/1
100 CAPSULE, LIQUID FILLED ORAL 2 TIMES DAILY PRN
Status: DISCONTINUED | OUTPATIENT
Start: 2019-06-25 | End: 2019-06-29 | Stop reason: HOSPADM

## 2019-06-25 RX ORDER — TRAZODONE HYDROCHLORIDE 100 MG/1
100 TABLET ORAL NIGHTLY
Status: DISCONTINUED | OUTPATIENT
Start: 2019-06-25 | End: 2019-06-25

## 2019-06-25 RX ORDER — PREDNISONE 1 MG/1
5 TABLET ORAL 2 TIMES DAILY
Status: DISCONTINUED | OUTPATIENT
Start: 2019-06-25 | End: 2019-06-27

## 2019-06-25 RX ORDER — ROPINIROLE 1 MG/1
2 TABLET, FILM COATED ORAL NIGHTLY
Status: DISCONTINUED | OUTPATIENT
Start: 2019-06-26 | End: 2019-06-29 | Stop reason: HOSPADM

## 2019-06-25 RX ORDER — FUROSEMIDE 10 MG/ML
40 INJECTION INTRAMUSCULAR; INTRAVENOUS 2 TIMES DAILY
Status: DISCONTINUED | OUTPATIENT
Start: 2019-06-25 | End: 2019-06-27

## 2019-06-25 RX ORDER — ACETAMINOPHEN 325 MG/1
650 TABLET ORAL EVERY 4 HOURS PRN
Status: DISCONTINUED | OUTPATIENT
Start: 2019-06-25 | End: 2019-06-29 | Stop reason: HOSPADM

## 2019-06-25 RX ORDER — CYCLOBENZAPRINE HCL 10 MG
20 TABLET ORAL NIGHTLY
Status: DISCONTINUED | OUTPATIENT
Start: 2019-06-26 | End: 2019-06-29 | Stop reason: HOSPADM

## 2019-06-25 RX ORDER — CYCLOBENZAPRINE HCL 10 MG
10 TABLET ORAL 2 TIMES DAILY
Status: DISCONTINUED | OUTPATIENT
Start: 2019-06-25 | End: 2019-06-25

## 2019-06-25 RX ORDER — PAROXETINE HYDROCHLORIDE 20 MG/1
20 TABLET, FILM COATED ORAL EVERY MORNING
Status: DISCONTINUED | OUTPATIENT
Start: 2019-06-26 | End: 2019-06-29 | Stop reason: HOSPADM

## 2019-06-25 RX ORDER — HYDROCODONE BITARTRATE AND ACETAMINOPHEN 5; 325 MG/1; MG/1
1 TABLET ORAL EVERY 4 HOURS PRN
Status: DISCONTINUED | OUTPATIENT
Start: 2019-06-25 | End: 2019-06-26

## 2019-06-25 RX ORDER — SODIUM CHLORIDE 0.9 % (FLUSH) 0.9 %
10 SYRINGE (ML) INJECTION PRN
Status: DISCONTINUED | OUTPATIENT
Start: 2019-06-25 | End: 2019-06-29 | Stop reason: HOSPADM

## 2019-06-25 RX ORDER — ONDANSETRON 2 MG/ML
4 INJECTION INTRAMUSCULAR; INTRAVENOUS EVERY 6 HOURS PRN
Status: DISCONTINUED | OUTPATIENT
Start: 2019-06-25 | End: 2019-06-29 | Stop reason: HOSPADM

## 2019-06-25 RX ORDER — LEVOTHYROXINE SODIUM 0.07 MG/1
75 TABLET ORAL DAILY
Status: DISCONTINUED | OUTPATIENT
Start: 2019-06-26 | End: 2019-06-28

## 2019-06-25 RX ORDER — SODIUM CHLORIDE 0.9 % (FLUSH) 0.9 %
10 SYRINGE (ML) INJECTION EVERY 12 HOURS SCHEDULED
Status: DISCONTINUED | OUTPATIENT
Start: 2019-06-25 | End: 2019-06-29 | Stop reason: HOSPADM

## 2019-06-25 RX ORDER — BUMETANIDE 0.25 MG/ML
1 INJECTION, SOLUTION INTRAMUSCULAR; INTRAVENOUS ONCE
Status: COMPLETED | OUTPATIENT
Start: 2019-06-25 | End: 2019-06-25

## 2019-06-25 RX ADMIN — SODIUM CHLORIDE, PRESERVATIVE FREE 10 ML: 5 INJECTION INTRAVENOUS at 22:51

## 2019-06-25 RX ADMIN — HYDROCODONE BITARTRATE AND ACETAMINOPHEN 1 TABLET: 5; 325 TABLET ORAL at 22:51

## 2019-06-25 RX ADMIN — ATORVASTATIN CALCIUM 40 MG: 40 TABLET, FILM COATED ORAL at 22:53

## 2019-06-25 RX ADMIN — TRAZODONE HYDROCHLORIDE 200 MG: 100 TABLET ORAL at 22:41

## 2019-06-25 RX ADMIN — ROPINIROLE 1 MG: 1 TABLET, FILM COATED ORAL at 22:54

## 2019-06-25 RX ADMIN — FUROSEMIDE 40 MG: 10 INJECTION, SOLUTION INTRAMUSCULAR; INTRAVENOUS at 22:51

## 2019-06-25 RX ADMIN — BUMETANIDE 1 MG: 0.25 INJECTION INTRAMUSCULAR; INTRAVENOUS at 19:59

## 2019-06-25 RX ADMIN — PREDNISONE 5 MG: 5 TABLET ORAL at 22:54

## 2019-06-25 RX ADMIN — ROPINIROLE HYDROCHLORIDE 2 MG: 1 TABLET, FILM COATED ORAL at 22:41

## 2019-06-25 RX ADMIN — IOPAMIDOL 80 ML: 755 INJECTION, SOLUTION INTRAVENOUS at 17:23

## 2019-06-25 RX ADMIN — LOSARTAN POTASSIUM 50 MG: 50 TABLET, FILM COATED ORAL at 22:53

## 2019-06-25 ASSESSMENT — ENCOUNTER SYMPTOMS
EYE PAIN: 0
EYE DISCHARGE: 0
WHEEZING: 0
SORE THROAT: 0
RHINORRHEA: 0
SHORTNESS OF BREATH: 1
DIARRHEA: 0
NAUSEA: 0
COUGH: 1
BACK PAIN: 0
VOMITING: 0
ABDOMINAL PAIN: 0

## 2019-06-25 ASSESSMENT — PAIN DESCRIPTION - PROGRESSION
CLINICAL_PROGRESSION: NOT CHANGED
CLINICAL_PROGRESSION: NOT CHANGED

## 2019-06-25 ASSESSMENT — SLEEP AND FATIGUE QUESTIONNAIRES
SLEEP PATTERN: DISTURBED/INTERRUPTED SLEEP
DIFFICULTY STAYING ASLEEP: YES
DIFFICULTY FALLING ASLEEP: YES
DO YOU USE A SLEEP AID: YES
DO YOU HAVE DIFFICULTY SLEEPING: YES
DIFFICULTY ARISING: NO
RESTFUL SLEEP: NO
AVERAGE NUMBER OF SLEEP HOURS: 2

## 2019-06-25 ASSESSMENT — PAIN DESCRIPTION - ONSET
ONSET: ON-GOING
ONSET: ON-GOING

## 2019-06-25 ASSESSMENT — PATIENT HEALTH QUESTIONNAIRE - PHQ9: SUM OF ALL RESPONSES TO PHQ QUESTIONS 1-9: 8

## 2019-06-25 ASSESSMENT — LIFESTYLE VARIABLES: HISTORY_ALCOHOL_USE: NO

## 2019-06-25 ASSESSMENT — PAIN SCALES - GENERAL
PAINLEVEL_OUTOF10: 0
PAINLEVEL_OUTOF10: 9
PAINLEVEL_OUTOF10: 7

## 2019-06-25 ASSESSMENT — PAIN DESCRIPTION - LOCATION
LOCATION: HIP
LOCATION: HIP

## 2019-06-25 ASSESSMENT — PAIN DESCRIPTION - FREQUENCY
FREQUENCY: CONTINUOUS
FREQUENCY: CONTINUOUS

## 2019-06-25 ASSESSMENT — PAIN DESCRIPTION - DESCRIPTORS
DESCRIPTORS: ACHING
DESCRIPTORS: ACHING

## 2019-06-25 ASSESSMENT — PAIN DESCRIPTION - ORIENTATION
ORIENTATION: LEFT
ORIENTATION: LEFT

## 2019-06-25 ASSESSMENT — PAIN DESCRIPTION - PAIN TYPE
TYPE: ACUTE PAIN
TYPE: ACUTE PAIN;SURGICAL PAIN

## 2019-06-25 ASSESSMENT — PAIN - FUNCTIONAL ASSESSMENT: PAIN_FUNCTIONAL_ASSESSMENT: ACTIVITIES ARE NOT PREVENTED

## 2019-06-25 ASSESSMENT — PAIN DESCRIPTION - DIRECTION: RADIATING_TOWARDS: DOWN LEG

## 2019-06-25 NOTE — ED PROVIDER NOTES
[facility]  eMERGENCY dEPARTMENT eNCOUnter          CHIEF COMPLAINT       Chief Complaint   Patient presents with    Shortness of Breath       Nurses Notes reviewed and I agree except as noted in the HPI. HISTORY OF PRESENT ILLNESS    Mari House is a 68 y.o. female who presents to the Emergency Department with a history of CABG, CAD, HTN, and COPD for the evaluation of shortness of breath and increase left leg swelling the past 2 days. Patient reports she had total left hip replacement surgery on 6/20/19 by Dr. Ap Russell. Prior to surgery she was on naproxen and aspirin and after surgery she began Xarelto. Patient had a stress test done prior to surgery which was normal. She states she also had bronchitis prior to surgery and cough is still lingering. Patient is a previous smoker and quit in 2004. She denies fever, chills, nausea, emesis, numbness, tingling, abdominal pain, weakness, or chest pain. No further complaints at initial evaluation. The HPI was provided by the patient. REVIEW OF SYSTEMS     Review of Systems   Constitutional: Negative for appetite change, chills, fatigue and fever. HENT: Negative for congestion, ear pain, rhinorrhea and sore throat. Eyes: Negative for pain, discharge and visual disturbance. Respiratory: Positive for cough and shortness of breath. Negative for wheezing. Cardiovascular: Positive for leg swelling (left leg). Negative for chest pain and palpitations. Gastrointestinal: Negative for abdominal pain, diarrhea, nausea and vomiting. Genitourinary: Negative for difficulty urinating, dysuria, hematuria and vaginal discharge. Musculoskeletal: Negative for arthralgias, back pain, joint swelling and neck pain. Skin: Negative for pallor and rash. Neurological: Negative for dizziness, syncope, weakness, light-headedness, numbness and headaches. Hematological: Negative for adenopathy.    Psychiatric/Behavioral: Negative for confusion and 1000 MG CAPS Take 1,000 mg by mouth daily      !! levothyroxine (SYNTHROID) 75 MCG tablet Take 75 mcg by mouth Daily      linaCLOtide (LINZESS) 72 MCG CAPS capsule Take 72 mcg by mouth every morning (before breakfast)      ! ! losartan (COZAAR) 50 MG tablet Take 50 mg by mouth 2 times daily      polyethylene glycol (GLYCOLAX) packet Take 17 g by mouth daily as needed for Constipation      Multiple Vitamins-Minerals (MULTIVITAMIN PO) Take 1 tablet by mouth daily      !! naproxen (NAPROSYN) 500 MG tablet Take 500 mg by mouth 2 times daily (with meals)      oxybutynin (DITROPAN XL) 15 MG extended release tablet Take 15 mg by mouth daily      !! pantoprazole (PROTONIX) 40 MG tablet Take 40 mg by mouth 2 times daily      PARoxetine (PAXIL) 20 MG tablet Take 20 mg by mouth every morning      !! rOPINIRole (REQUIP) 1 MG tablet Take 1 mg by mouth 2 tabs at bedtime and 1 tab through day prn      !! traZODone (DESYREL) 100 MG tablet Take 200 mg by mouth nightly      vitamin D (CHOLECALCIFEROL) 1000 units TABS tablet Take 1,000 Units by mouth daily      predniSONE (DELTASONE) 5 MG tablet Take 5 mg by mouth 2 times daily      !! rivaroxaban (XARELTO) 10 MG TABS tablet for DVT PROPHYLAXIS Take 1 tablet by mouth Daily. Qty: 18 tablet, Refills: 0      !! naproxen (NAPROSYN) 500 MG tablet Take 500 mg by mouth 2 times daily (with meals). !! cyclobenzaprine (FLEXERIL) 10 MG tablet Take 10 mg by mouth 2 times daily. TAKES 1 IN AM, 2 AT BEDTIME       !! atorvastatin (LIPITOR) 40 MG tablet Take 40 mg by mouth nightly. !! carvedilol (COREG) 3.125 MG tablet Take 3.125 mg by mouth 2 times daily (with meals). !! losartan (COZAAR) 50 MG tablet Take 50 mg by mouth 2 times daily. !! levothyroxine (SYNTHROID) 75 MCG tablet Take 75 mcg by mouth daily. PARoxetine (PAXIL-CR) 25 MG CR tablet Take 25 mg by mouth nightly. !! traZODone (DESYREL) 100 MG tablet Take 100 mg by mouth nightly. 1 1/2 TAB       ! !

## 2019-06-26 ENCOUNTER — APPOINTMENT (OUTPATIENT)
Dept: GENERAL RADIOLOGY | Age: 69
DRG: 291 | End: 2019-06-26
Payer: MEDICARE

## 2019-06-26 ENCOUNTER — HOSPITAL ENCOUNTER (OUTPATIENT)
Dept: PHYSICAL THERAPY | Age: 69
Setting detail: THERAPIES SERIES
Discharge: HOME | End: 2019-06-26
Payer: MEDICARE

## 2019-06-26 LAB
ANION GAP SERPL CALCULATED.3IONS-SCNC: 15 MEQ/L (ref 8–16)
BUN BLDV-MCNC: 18 MG/DL (ref 7–22)
CALCIUM SERPL-MCNC: 9.5 MG/DL (ref 8.5–10.5)
CHLORIDE BLD-SCNC: 102 MEQ/L (ref 98–111)
CHOLESTEROL, TOTAL: 183 MG/DL (ref 100–199)
CO2: 25 MEQ/L (ref 23–33)
CREAT SERPL-MCNC: 0.9 MG/DL (ref 0.4–1.2)
EKG ATRIAL RATE: 89 BPM
EKG P AXIS: 55 DEGREES
EKG P-R INTERVAL: 146 MS
EKG Q-T INTERVAL: 364 MS
EKG QRS DURATION: 120 MS
EKG QTC CALCULATION (BAZETT): 442 MS
EKG R AXIS: 62 DEGREES
EKG T AXIS: -4 DEGREES
EKG VENTRICULAR RATE: 89 BPM
ERYTHROCYTE [DISTWIDTH] IN BLOOD BY AUTOMATED COUNT: 14.7 % (ref 11.5–14.5)
ERYTHROCYTE [DISTWIDTH] IN BLOOD BY AUTOMATED COUNT: 54.8 FL (ref 35–45)
GFR SERPL CREATININE-BSD FRML MDRD: 62 ML/MIN/1.73M2
GLUCOSE BLD-MCNC: 123 MG/DL (ref 70–108)
HCT VFR BLD CALC: 32.1 % (ref 37–47)
HDLC SERPL-MCNC: 54 MG/DL
HEMOGLOBIN: 9.9 GM/DL (ref 12–16)
LDL CHOLESTEROL CALCULATED: 86 MG/DL
LV EF: 33 %
LVEF MODALITY: NORMAL
MAGNESIUM: 1.9 MG/DL (ref 1.6–2.4)
MCH RBC QN AUTO: 31.6 PG (ref 26–33)
MCHC RBC AUTO-ENTMCNC: 30.8 GM/DL (ref 32.2–35.5)
MCV RBC AUTO: 102.6 FL (ref 81–99)
PLATELET # BLD: 462 THOU/MM3 (ref 130–400)
PMV BLD AUTO: 8.9 FL (ref 9.4–12.4)
POTASSIUM SERPL-SCNC: 3.5 MEQ/L (ref 3.5–5.2)
RBC # BLD: 3.13 MILL/MM3 (ref 4.2–5.4)
SODIUM BLD-SCNC: 142 MEQ/L (ref 135–145)
TRIGL SERPL-MCNC: 217 MG/DL (ref 0–199)
TROPONIN T: < 0.01 NG/ML
WBC # BLD: 10.7 THOU/MM3 (ref 4.8–10.8)

## 2019-06-26 PROCEDURE — 96376 TX/PRO/DX INJ SAME DRUG ADON: CPT

## 2019-06-26 PROCEDURE — 6370000000 HC RX 637 (ALT 250 FOR IP): Performed by: INTERNAL MEDICINE

## 2019-06-26 PROCEDURE — 2709999900 HC NON-CHARGEABLE SUPPLY

## 2019-06-26 PROCEDURE — 93306 TTE W/DOPPLER COMPLETE: CPT

## 2019-06-26 PROCEDURE — 80061 LIPID PANEL: CPT

## 2019-06-26 PROCEDURE — 99223 1ST HOSP IP/OBS HIGH 75: CPT | Performed by: INTERNAL MEDICINE

## 2019-06-26 PROCEDURE — 6360000002 HC RX W HCPCS: Performed by: PHYSICIAN ASSISTANT

## 2019-06-26 PROCEDURE — 85027 COMPLETE CBC AUTOMATED: CPT

## 2019-06-26 PROCEDURE — 36415 COLL VENOUS BLD VENIPUNCTURE: CPT

## 2019-06-26 PROCEDURE — 97162 PT EVAL MOD COMPLEX 30 MIN: CPT

## 2019-06-26 PROCEDURE — 80048 BASIC METABOLIC PNL TOTAL CA: CPT

## 2019-06-26 PROCEDURE — 1200000003 HC TELEMETRY R&B

## 2019-06-26 PROCEDURE — 2580000003 HC RX 258: Performed by: PHYSICIAN ASSISTANT

## 2019-06-26 PROCEDURE — 73560 X-RAY EXAM OF KNEE 1 OR 2: CPT

## 2019-06-26 PROCEDURE — 84484 ASSAY OF TROPONIN QUANT: CPT

## 2019-06-26 PROCEDURE — 6370000000 HC RX 637 (ALT 250 FOR IP): Performed by: PHYSICIAN ASSISTANT

## 2019-06-26 PROCEDURE — 71045 X-RAY EXAM CHEST 1 VIEW: CPT

## 2019-06-26 PROCEDURE — 83735 ASSAY OF MAGNESIUM: CPT

## 2019-06-26 PROCEDURE — 93010 ELECTROCARDIOGRAM REPORT: CPT | Performed by: INTERNAL MEDICINE

## 2019-06-26 PROCEDURE — 99232 SBSQ HOSP IP/OBS MODERATE 35: CPT | Performed by: INTERNAL MEDICINE

## 2019-06-26 PROCEDURE — 97116 GAIT TRAINING THERAPY: CPT

## 2019-06-26 RX ORDER — HYDROCODONE BITARTRATE AND ACETAMINOPHEN 5; 325 MG/1; MG/1
2 TABLET ORAL EVERY 4 HOURS PRN
Status: DISCONTINUED | OUTPATIENT
Start: 2019-06-26 | End: 2019-06-29 | Stop reason: HOSPADM

## 2019-06-26 RX ORDER — POTASSIUM CHLORIDE 20 MEQ/1
20 TABLET, EXTENDED RELEASE ORAL DAILY
Status: DISCONTINUED | OUTPATIENT
Start: 2019-06-26 | End: 2019-06-29 | Stop reason: HOSPADM

## 2019-06-26 RX ORDER — CARVEDILOL 6.25 MG/1
12.5 TABLET ORAL 2 TIMES DAILY WITH MEALS
Status: DISCONTINUED | OUTPATIENT
Start: 2019-06-26 | End: 2019-06-27

## 2019-06-26 RX ORDER — HYDROCODONE BITARTRATE AND ACETAMINOPHEN 5; 325 MG/1; MG/1
1 TABLET ORAL EVERY 4 HOURS PRN
Status: DISCONTINUED | OUTPATIENT
Start: 2019-06-26 | End: 2019-06-29 | Stop reason: HOSPADM

## 2019-06-26 RX ORDER — POTASSIUM CHLORIDE 20 MEQ/1
40 TABLET, EXTENDED RELEASE ORAL ONCE
Status: COMPLETED | OUTPATIENT
Start: 2019-06-26 | End: 2019-06-26

## 2019-06-26 RX ORDER — OMEGA-3-ACID ETHYL ESTERS 1 G/1
1000 CAPSULE, LIQUID FILLED ORAL DAILY
Status: DISCONTINUED | OUTPATIENT
Start: 2019-06-26 | End: 2019-06-29 | Stop reason: HOSPADM

## 2019-06-26 RX ORDER — LACTOBACILLUS RHAMNOSUS GG 10B CELL
1 CAPSULE ORAL DAILY
Status: DISCONTINUED | OUTPATIENT
Start: 2019-06-26 | End: 2019-06-29 | Stop reason: HOSPADM

## 2019-06-26 RX ADMIN — HYDROCODONE BITARTRATE AND ACETAMINOPHEN 2 TABLET: 5; 325 TABLET ORAL at 04:43

## 2019-06-26 RX ADMIN — CYCLOBENZAPRINE HYDROCHLORIDE 20 MG: 10 TABLET, FILM COATED ORAL at 21:11

## 2019-06-26 RX ADMIN — POTASSIUM CHLORIDE 40 MEQ: 20 TABLET, EXTENDED RELEASE ORAL at 09:42

## 2019-06-26 RX ADMIN — ROPINIROLE HYDROCHLORIDE 1 MG: 1 TABLET, FILM COATED ORAL at 09:22

## 2019-06-26 RX ADMIN — OXYBUTYNIN CHLORIDE 15 MG: 10 TABLET, EXTENDED RELEASE ORAL at 09:20

## 2019-06-26 RX ADMIN — HYDROCODONE BITARTRATE AND ACETAMINOPHEN 2 TABLET: 5; 325 TABLET ORAL at 19:42

## 2019-06-26 RX ADMIN — CARVEDILOL 6.25 MG: 6.25 TABLET, FILM COATED ORAL at 09:23

## 2019-06-26 RX ADMIN — ASPIRIN 81 MG: 81 TABLET ORAL at 09:16

## 2019-06-26 RX ADMIN — CARVEDILOL 12.5 MG: 6.25 TABLET, FILM COATED ORAL at 17:31

## 2019-06-26 RX ADMIN — SODIUM CHLORIDE, PRESERVATIVE FREE 10 ML: 5 INJECTION INTRAVENOUS at 13:54

## 2019-06-26 RX ADMIN — CYCLOBENZAPRINE HYDROCHLORIDE 10 MG: 10 TABLET, FILM COATED ORAL at 09:18

## 2019-06-26 RX ADMIN — HYDROCODONE BITARTRATE AND ACETAMINOPHEN 2 TABLET: 5; 325 TABLET ORAL at 13:54

## 2019-06-26 RX ADMIN — HYDROCODONE BITARTRATE AND ACETAMINOPHEN 1 TABLET: 5; 325 TABLET ORAL at 00:10

## 2019-06-26 RX ADMIN — AMLODIPINE BESYLATE 5 MG: 5 TABLET ORAL at 09:16

## 2019-06-26 RX ADMIN — ATORVASTATIN CALCIUM 40 MG: 40 TABLET, FILM COATED ORAL at 21:11

## 2019-06-26 RX ADMIN — RIVAROXABAN 10 MG: 10 TABLET, FILM COATED ORAL at 17:31

## 2019-06-26 RX ADMIN — PANTOPRAZOLE SODIUM 40 MG: 40 TABLET, DELAYED RELEASE ORAL at 09:19

## 2019-06-26 RX ADMIN — DOCUSATE SODIUM 100 MG: 100 CAPSULE, LIQUID FILLED ORAL at 21:17

## 2019-06-26 RX ADMIN — LOSARTAN POTASSIUM 50 MG: 50 TABLET, FILM COATED ORAL at 09:18

## 2019-06-26 RX ADMIN — FLUTICASONE PROPIONATE 1 SPRAY: 50 SPRAY, METERED NASAL at 09:43

## 2019-06-26 RX ADMIN — PAROXETINE HYDROCHLORIDE 20 MG: 20 TABLET, FILM COATED ORAL at 09:19

## 2019-06-26 RX ADMIN — DOCUSATE SODIUM 100 MG: 100 CAPSULE, LIQUID FILLED ORAL at 09:43

## 2019-06-26 RX ADMIN — FUROSEMIDE 40 MG: 10 INJECTION, SOLUTION INTRAMUSCULAR; INTRAVENOUS at 09:42

## 2019-06-26 RX ADMIN — LOSARTAN POTASSIUM 50 MG: 50 TABLET, FILM COATED ORAL at 21:11

## 2019-06-26 RX ADMIN — HYDROCODONE BITARTRATE AND ACETAMINOPHEN 2 TABLET: 5; 325 TABLET ORAL at 09:15

## 2019-06-26 RX ADMIN — ROPINIROLE HYDROCHLORIDE 2 MG: 1 TABLET, FILM COATED ORAL at 21:16

## 2019-06-26 RX ADMIN — TRAZODONE HYDROCHLORIDE 200 MG: 100 TABLET ORAL at 21:11

## 2019-06-26 RX ADMIN — CALCIUM 500 MG: 500 TABLET ORAL at 21:11

## 2019-06-26 RX ADMIN — SODIUM CHLORIDE, PRESERVATIVE FREE 10 ML: 5 INJECTION INTRAVENOUS at 21:11

## 2019-06-26 RX ADMIN — PREDNISONE 5 MG: 5 TABLET ORAL at 09:21

## 2019-06-26 RX ADMIN — Medication 1 CAPSULE: at 09:20

## 2019-06-26 RX ADMIN — FUROSEMIDE 40 MG: 10 INJECTION, SOLUTION INTRAMUSCULAR; INTRAVENOUS at 21:11

## 2019-06-26 RX ADMIN — PREDNISONE 5 MG: 5 TABLET ORAL at 21:11

## 2019-06-26 RX ADMIN — LEVOTHYROXINE SODIUM 75 MCG: 75 TABLET ORAL at 06:25

## 2019-06-26 RX ADMIN — POTASSIUM CHLORIDE 20 MEQ: 20 TABLET, EXTENDED RELEASE ORAL at 09:41

## 2019-06-26 ASSESSMENT — PAIN - FUNCTIONAL ASSESSMENT
PAIN_FUNCTIONAL_ASSESSMENT: ACTIVITIES ARE NOT PREVENTED

## 2019-06-26 ASSESSMENT — PAIN SCALES - GENERAL
PAINLEVEL_OUTOF10: 5
PAINLEVEL_OUTOF10: 5
PAINLEVEL_OUTOF10: 0
PAINLEVEL_OUTOF10: 5
PAINLEVEL_OUTOF10: 7
PAINLEVEL_OUTOF10: 5
PAINLEVEL_OUTOF10: 7

## 2019-06-26 ASSESSMENT — PAIN DESCRIPTION - FREQUENCY
FREQUENCY: CONTINUOUS

## 2019-06-26 ASSESSMENT — PAIN DESCRIPTION - PAIN TYPE
TYPE: ACUTE PAIN;SURGICAL PAIN
TYPE: ACUTE PAIN;SURGICAL PAIN
TYPE: ACUTE PAIN
TYPE: ACUTE PAIN;SURGICAL PAIN
TYPE: ACUTE PAIN
TYPE: ACUTE PAIN

## 2019-06-26 ASSESSMENT — PAIN DESCRIPTION - PROGRESSION
CLINICAL_PROGRESSION: NOT CHANGED

## 2019-06-26 ASSESSMENT — PAIN DESCRIPTION - ORIENTATION
ORIENTATION: LEFT

## 2019-06-26 ASSESSMENT — PAIN DESCRIPTION - DESCRIPTORS
DESCRIPTORS: ACHING;SORE
DESCRIPTORS: ACHING

## 2019-06-26 ASSESSMENT — PAIN DESCRIPTION - ONSET
ONSET: ON-GOING

## 2019-06-26 ASSESSMENT — PAIN DESCRIPTION - LOCATION
LOCATION: HIP

## 2019-06-26 NOTE — PROGRESS NOTES
Stop Norvasc 5 mg and increase Coreg dose from 3.125 mg was increased to 6.125 mg on 6/26 and subsequently to 12.5 mg twice a day. Patient might benefit from angiogram at some time because of the new onset CHF. X-ray of the left knee    Subjective (past 24 hours): Denies any chest pain, shortness of breath improved, edema in the legs improved, urinating a lot, had bowel movement 2 days ago, passing gas, admits to having pain in the left knee today       Medications:  Reviewed    Infusion Medications   Scheduled Medications    omega-3 acid ethyl esters  1,000 mg Oral Daily    lactobacillus  1 capsule Oral Daily    potassium (CARDIAC) replacement protocol   Other RX Placeholder    potassium chloride  20 mEq Oral Daily    sodium chloride flush  10 mL Intravenous 2 times per day    furosemide  40 mg Intravenous BID    amLODIPine  5 mg Oral Daily    aspirin  81 mg Oral Daily    atorvastatin  40 mg Oral Nightly    calcium elemental  1 tablet Oral BID    carvedilol  6.25 mg Oral BID WC    fluticasone  1 spray Nasal Daily    losartan  50 mg Oral BID    levothyroxine  75 mcg Oral Daily    oxybutynin  15 mg Oral Daily    pantoprazole  40 mg Oral Daily    PARoxetine  20 mg Oral QAM    predniSONE  5 mg Oral BID    rivaroxaban  10 mg Oral Q24H    vitamin D  1,000 Units Oral Daily    cyclobenzaprine  10 mg Oral Daily    cyclobenzaprine  20 mg Oral Nightly    traZODone  200 mg Oral Nightly    rOPINIRole  2 mg Oral Nightly    rOPINIRole  1 mg Oral Daily     PRN Meds: HYDROcodone 5 mg - acetaminophen **OR** HYDROcodone 5 mg - acetaminophen, sodium chloride flush, magnesium hydroxide, ondansetron, acetaminophen, docusate sodium, melatonin      Intake/Output Summary (Last 24 hours) at 6/26/2019 1453  Last data filed at 6/26/2019 1213  Gross per 24 hour   Intake 400 ml   Output 650 ml   Net -250 ml       Diet:  DIET CARB CONTROL;     Exam:  /64   Pulse 84   Temp 98.7 °F (37.1 °C) (Oral)   Resp 18 extremity edema [R60.0] 06/25/2019       Electronically signed by Mayco Lackey MD on 6/26/2019 at 2:53 PM

## 2019-06-26 NOTE — CONSULTS
affect. WORKUP:  Sodium 142, potassium 3.5, BUN 18, creatinine 0.9, magnesium  1.9. Troponin less than 0.01. White blood cell 10.7, hemoglobin 9.9  and platelet 012. Chest x-ray increased bronchovascular markings, interstitial infiltrate  suggestive for congestion. BNP 2710, troponin x1 less than 0.01. EKG  showed sinus rhythm and basically left bundle-branch block. Compared to  the EKG on 07/27/2004, there was no significant changes as per the  reading, but I do not have the EKG in front of me of the one from 2004. ASSESSMENT:  1. Acute congestive heart failure, systolic versus diastolic unknown. Get an echocardiogram.  So, the congest heart failure exacerbation  probably postoperative and preoperative fluid related to the surgery, I  believe. So, bilateral pedal, pretibial edema of +1 at this time the  patient stated that was better than yesterday. 2.  Coronary artery disease, status post previous bypass. 3.  Abnormal EKG with left bundle branch block. 4.  Hypertension. 5.  Hyperlipidemia. 6.  History of COPD. 7.  Postop state five days. PLAN AND RECOMMENDATION:  1. At this level, we will continue with home medication and continue  gentle diuresis with close followup of the renal function and  electrolytes and potassium, magnesium. 2.  Continue with standard potassium replacement protocol plus potassium  daily 20 mEq and lab BMP, magnesium in the morning. Chest x-ray in the  morning. Adjust diuresis accordingly and probably when discharged the  patient may need a low dose diuretics and to be adjusted further as  outpatient with her own cardiologist which is in [facility]. 3.  We will get an echocardiogram and based on the course, we will get  further care. Thank you for allowing me to participate in the care of this patient. I  will follow up with you. Merrick Perez.  Gust Sandhoff, M.D.    D: 06/26/2019 9:11:04       T: 06/26/2019 10:37:23     [email or web address]  Job#: [id number]     Doc#:

## 2019-06-26 NOTE — H&P
Provider, MD   oxybutynin (DITROPAN XL) 15 MG extended release tablet Take 15 mg by mouth daily    Historical Provider, MD   pantoprazole (PROTONIX) 40 MG tablet Take 40 mg by mouth 2 times daily    Historical Provider, MD   PARoxetine (PAXIL) 20 MG tablet Take 20 mg by mouth every morning    Historical Provider, MD   rOPINIRole (REQUIP) 1 MG tablet Take 1 mg by mouth 2 tabs at bedtime and 1 tab through day prn    Historical Provider, MD   traZODone (DESYREL) 100 MG tablet Take 200 mg by mouth nightly    Historical Provider, MD   vitamin D (CHOLECALCIFEROL) 1000 units TABS tablet Take 1,000 Units by mouth daily    Historical Provider, MD   predniSONE (DELTASONE) 5 MG tablet Take 5 mg by mouth 2 times daily    Historical Provider, MD   rivaroxaban (XARELTO) 10 MG TABS tablet for DVT PROPHYLAXIS Take 1 tablet by mouth Daily. 12/6/12   Ettie Essex, MD   naproxen (NAPROSYN) 500 MG tablet Take 500 mg by mouth 2 times daily (with meals). Historical Provider, MD   cyclobenzaprine (FLEXERIL) 10 MG tablet Take 10 mg by mouth 2 times daily. TAKES 1 IN AM, 2 AT BEDTIME     Historical Provider, MD   atorvastatin (LIPITOR) 40 MG tablet Take 40 mg by mouth nightly. Historical Provider, MD   carvedilol (COREG) 3.125 MG tablet Take 3.125 mg by mouth 2 times daily (with meals). Historical Provider, MD   losartan (COZAAR) 50 MG tablet Take 50 mg by mouth 2 times daily. Historical Provider, MD   levothyroxine (SYNTHROID) 75 MCG tablet Take 75 mcg by mouth daily. Historical Provider, MD   PARoxetine (PAXIL-CR) 25 MG CR tablet Take 25 mg by mouth nightly. Historical Provider, MD   traZODone (DESYREL) 100 MG tablet Take 100 mg by mouth nightly. 1 1/2 TAB     Historical Provider, MD   rOPINIRole (REQUIP) 1 MG tablet Take 1 mg by mouth 3 times daily. Historical Provider, MD   pantoprazole (PROTONIX) 40 MG tablet Take 40 mg by mouth daily.       Historical Provider, MD   darifenacin (ENABLEX) 7.5 MG ER

## 2019-06-26 NOTE — PLAN OF CARE
Return to [facility] home with [facility] for RN, PT and OT.

## 2019-06-26 NOTE — PROGRESS NOTES
[facility]  INPATIENT PHYSICAL THERAPY  EVALUATION  [facility] MED SURG 8B - 8B-31/031-A    Time In: 0914  Time Out: 0940  Timed Code Treatment Minutes: 10 Minutes  Minutes: 26          Date: 2019  Patient Name: Mikhail Ramos,  Gender:  female        MRN: [id number]  : 1950  (73 y.o.)      Referring Practitioner: Bijan Brothers PA-C  Diagnosis: new onset of congestive heart failure   Additional Pertinent Hx: Per EMR:\" 68 y.o. female with PMH of CABG, HTN, CAD, and COPD who presented to [facility] with complaint of SOB and lower extremity swelling that has been gradually worsening over the past 2 days. Pt is s/p left hip replacement with Dr. Curt Gray last  and pt states that she feels like she has been declining since then as she Brain Curd' felt good\" and has not been up walking around as much. Pt notes that she is a former smoker and quit in . Pt denies HA/dizziness/fever/chills/CP/palpitations/n/v/d/dysuria/numbness/tingling. She notes that she is constipated but that this is a chronic issue resulting from her IBS. \"     Past Medical History:   Diagnosis Date    CAD (coronary artery disease)     [facility]     Hyperlipidemia     Hypertension     Irritable bowel syndrome (IBS)     New onset of congestive heart failure ([facility] 2019    Primary osteoarthritis of left hip 2019    Thyroid disease      Past Surgical History:   Procedure Laterality Date    APPENDECTOMY  1972    CARDIAC SURGERY      cabg x1     CARDIAC SURGERY  2004    cabg x1    CARPAL TUNNEL RELEASE      right side    CARPAL TUNNEL RELEASE Right      SECTION  3227,1491     SECTION      x2    CHOLECYSTECTOMY  1970s    CHOLECYSTECTOMY      EYE SURGERY Bilateral 2015    cataracts    FRACTURE SURGERY      HIP ARTHROPLASTY  12/3/2012    RIGHT    JOINT REPLACEMENT Right 2013    hip    TOTAL HIP ARTHROPLASTY Left 2019 Restrictions/Precautions:  Weight Bearing           Left Lower Extremity Weight Bearing: Weight Bearing As Tolerated        Hip Precautions: No hip flexion > 90 degrees, No hip internal rotation, Posterior hip precautions, No ADduction       Subjective:  Chart Reviewed: Yes  Patient assessed for rehabilitation services?: Yes  Family / Caregiver Present: Yes  Subjective: RN approved PT evaluation. Pt in supine upon arrival and was agreeable to PT interventions. General:  Overall Orientation Status: Within Functional Limits  Follows Commands: Within Functional Limits    Vision: Impaired  Vision Exceptions: Wears glasses at all times    Hearing: Within functional limits         Pain:  Yes. Pain Assessment  Pain Assessment: 0-10  Pain Level: 5  Pain Type: Acute pain;Surgical pain  Pain Location: Hip  Pain Orientation: Left  Non-Pharmaceutical Pain Intervention(s): Repositioned  Response to Pain Intervention: Patient Satisfied       Social/Functional History:    Lives With: Spouse  Type of Home: House  Home Layout: One level  Home Access: Stairs to enter without rails  Entrance Stairs - Number of Steps: 1(platform)  Home Equipment: Rolling walker     Bathroom Shower/Tub: Tub/Shower unit  Bathroom Toilet: Standard  Bathroom Equipment: Tub transfer bench, Toilet raiser       ADL Assistance: Independent  Homemaking Assistance: Independent  Ambulation Assistance: Independent  Transfer Assistance: Independent    Active : Yes     Additional Comments: Pt was recently admitted secondary to a L DEVANTE by Dr. Lucina Orozco. Pt notes that she is currently living at her [facility] secondary to black mold at her home. Pt will be returning to her [facility] home post d/c which is noted above. Pt was I prior to recent hip surgery. [facility].        Objective:       RLE AROM: WFL         LLE AROM : WFL  LLE General AROM: pn with left knee flexion      Strength RLE: WFL    Strength LLE:

## 2019-06-26 NOTE — PROGRESS NOTES
Nutrition Assessment    Type and Reason for Visit: Initial, Consult(heart failure diet guidelines)    Nutrition Recommendations: Consider multivitamin. ONS initiated. Nutrition Assessment:   Pt. nutritionally compromised AEB patient report of drinking 1-2 high protein supplement drinks daily as she does not like to cook for herself since her  passed away 2 years ago. At risk for further nutrition compromise r/t increased nutrient needs for healing, underlying medical condition (COPD) and admit with CHF. Will provide Ensure high protein BID. Educated patient on CHF diet guidelines. Malnutrition Assessment:  · Malnutrition Status: At risk for malnutrition    Nutrition Risk Level:  Moderate    Nutrient Needs:  · Estimated Daily Total Kcal: 2126-2608 kcals (25-30 kcal/kgm- 60kgm 6/26)  · Estimated Daily Protein (g): 72-90 grams (1.2-1.5 grams protein/kgm- 60kgm 6/26)    Nutrition Diagnosis:   · Problem: Increased nutrient needs  · Etiology: related to Increased demand for energy/nutrients     Signs and symptoms:  as evidenced by (recent surgery)    Objective Information:  · Nutrition-Focused Physical Findings: has had esophagus dilated in the past and follows with GI doctor, avoids some meats and breads, dislikes cooking for self since  passed 2 years ago, medication includes lasix and culturelle, (states had been on prednisone for the past year) glucose 123, triglycerides 217   · Wound Type: (left hip surgery 6/20/19)  · Current Nutrition Therapies:  · Oral Diet Orders: (carbohydrate control)   · Oral Diet intake: (50% meals today per patient )  · Oral Nutrition Supplement (ONS) Orders: Low Calorie High Protein Supplement(BID)  · ONS intake: Unable to assess  · Anthropometric Measures:  · Ht: 5' 2\" (157.5 cm)   · Current Body Wt: 132 lb 14.4 oz (60.3 kg)  · Admission Body Wt: 132 lb 14.4 oz (60.3 kg)(6/26; +1,+2 pitting edema)  · Usual Body Wt: (patient reports hard to say because it has fluctuated over the years; per EHR: 6/20/19 130# 8.2oz)  · Ideal Body Wt: 110 lb (49.9 kg),  · BMI Classification: BMI 18.5 - 24.9 Normal Weight    Nutrition Interventions:   Continue current diet, Start ONS  Continued Inpatient Monitoring, Education Initiated, Coordination of Care(educated patient on low sodium and fluid restriction guidelines for CHF, information sheets provided for reference)    Nutrition Evaluation:   · Evaluation: Goals set   · Goals: Patient will consume 75% or more of meals to aid in healing during LOS.      · Monitoring: Meal Intake, Supplement Intake, Skin Integrity, Wound Healing, Weight, Pertinent Labs, Monitor Bowel Function, Patient/Family Education      Electronically signed by Vernell Dan RD, LD on 6/26/19 at 4:13 PM    Contact Number: [phone]

## 2019-06-27 ENCOUNTER — APPOINTMENT (OUTPATIENT)
Dept: GENERAL RADIOLOGY | Age: 69
DRG: 291 | End: 2019-06-27
Payer: MEDICARE

## 2019-06-27 LAB
ANION GAP SERPL CALCULATED.3IONS-SCNC: 14 MEQ/L (ref 8–16)
BUN BLDV-MCNC: 27 MG/DL (ref 7–22)
CALCIUM SERPL-MCNC: 10.2 MG/DL (ref 8.5–10.5)
CHLORIDE BLD-SCNC: 96 MEQ/L (ref 98–111)
CO2: 27 MEQ/L (ref 23–33)
CREAT SERPL-MCNC: 0.9 MG/DL (ref 0.4–1.2)
ERYTHROCYTE [DISTWIDTH] IN BLOOD BY AUTOMATED COUNT: 14.6 % (ref 11.5–14.5)
ERYTHROCYTE [DISTWIDTH] IN BLOOD BY AUTOMATED COUNT: 52.7 FL (ref 35–45)
GFR SERPL CREATININE-BSD FRML MDRD: 62 ML/MIN/1.73M2
GLUCOSE BLD-MCNC: 131 MG/DL (ref 70–108)
HCT VFR BLD CALC: 33.2 % (ref 37–47)
HEMOGLOBIN: 10.5 GM/DL (ref 12–16)
MAGNESIUM: 2 MG/DL (ref 1.6–2.4)
MCH RBC QN AUTO: 32 PG (ref 26–33)
MCHC RBC AUTO-ENTMCNC: 31.6 GM/DL (ref 32.2–35.5)
MCV RBC AUTO: 101.2 FL (ref 81–99)
PLATELET # BLD: 503 THOU/MM3 (ref 130–400)
PMV BLD AUTO: 8.7 FL (ref 9.4–12.4)
POTASSIUM SERPL-SCNC: 4.1 MEQ/L (ref 3.5–5.2)
PRO-BNP: 863.4 PG/ML (ref 0–900)
RBC # BLD: 3.28 MILL/MM3 (ref 4.2–5.4)
SODIUM BLD-SCNC: 137 MEQ/L (ref 135–145)
WBC # BLD: 9.6 THOU/MM3 (ref 4.8–10.8)

## 2019-06-27 PROCEDURE — 2580000003 HC RX 258: Performed by: PHYSICIAN ASSISTANT

## 2019-06-27 PROCEDURE — 83880 ASSAY OF NATRIURETIC PEPTIDE: CPT

## 2019-06-27 PROCEDURE — 94640 AIRWAY INHALATION TREATMENT: CPT

## 2019-06-27 PROCEDURE — 6360000002 HC RX W HCPCS: Performed by: PHYSICIAN ASSISTANT

## 2019-06-27 PROCEDURE — 6370000000 HC RX 637 (ALT 250 FOR IP): Performed by: INTERNAL MEDICINE

## 2019-06-27 PROCEDURE — 6370000000 HC RX 637 (ALT 250 FOR IP): Performed by: PHYSICIAN ASSISTANT

## 2019-06-27 PROCEDURE — 97166 OT EVAL MOD COMPLEX 45 MIN: CPT

## 2019-06-27 PROCEDURE — 99232 SBSQ HOSP IP/OBS MODERATE 35: CPT | Performed by: INTERNAL MEDICINE

## 2019-06-27 PROCEDURE — 71046 X-RAY EXAM CHEST 2 VIEWS: CPT

## 2019-06-27 PROCEDURE — 83735 ASSAY OF MAGNESIUM: CPT

## 2019-06-27 PROCEDURE — 80048 BASIC METABOLIC PNL TOTAL CA: CPT

## 2019-06-27 PROCEDURE — 97535 SELF CARE MNGMENT TRAINING: CPT

## 2019-06-27 PROCEDURE — 36415 COLL VENOUS BLD VENIPUNCTURE: CPT

## 2019-06-27 PROCEDURE — 2709999900 HC NON-CHARGEABLE SUPPLY

## 2019-06-27 PROCEDURE — 85027 COMPLETE CBC AUTOMATED: CPT

## 2019-06-27 PROCEDURE — 1200000003 HC TELEMETRY R&B

## 2019-06-27 PROCEDURE — 97116 GAIT TRAINING THERAPY: CPT

## 2019-06-27 RX ORDER — PANTOPRAZOLE SODIUM 40 MG/1
40 TABLET, DELAYED RELEASE ORAL
Status: DISCONTINUED | OUTPATIENT
Start: 2019-06-27 | End: 2019-06-29 | Stop reason: HOSPADM

## 2019-06-27 RX ORDER — SENNA PLUS 8.6 MG/1
1 TABLET ORAL NIGHTLY
Status: DISCONTINUED | OUTPATIENT
Start: 2019-06-27 | End: 2019-06-29 | Stop reason: HOSPADM

## 2019-06-27 RX ORDER — PREDNISONE 1 MG/1
2.5 TABLET ORAL 2 TIMES DAILY
Status: DISCONTINUED | OUTPATIENT
Start: 2019-06-27 | End: 2019-06-29 | Stop reason: HOSPADM

## 2019-06-27 RX ORDER — CARVEDILOL 6.25 MG/1
6.25 TABLET ORAL 2 TIMES DAILY WITH MEALS
Status: DISCONTINUED | OUTPATIENT
Start: 2019-06-27 | End: 2019-06-29 | Stop reason: HOSPADM

## 2019-06-27 RX ORDER — BUMETANIDE 1 MG/1
1 TABLET ORAL DAILY
Status: DISCONTINUED | OUTPATIENT
Start: 2019-06-28 | End: 2019-06-29

## 2019-06-27 RX ORDER — IPRATROPIUM BROMIDE AND ALBUTEROL SULFATE 2.5; .5 MG/3ML; MG/3ML
1 SOLUTION RESPIRATORY (INHALATION) 4 TIMES DAILY
COMMUNITY

## 2019-06-27 RX ORDER — IPRATROPIUM BROMIDE AND ALBUTEROL SULFATE 2.5; .5 MG/3ML; MG/3ML
1 SOLUTION RESPIRATORY (INHALATION)
Status: DISCONTINUED | OUTPATIENT
Start: 2019-06-27 | End: 2019-06-29 | Stop reason: HOSPADM

## 2019-06-27 RX ORDER — POLYETHYLENE GLYCOL 3350 17 G/17G
17 POWDER, FOR SOLUTION ORAL PRN
Status: DISCONTINUED | OUTPATIENT
Start: 2019-06-27 | End: 2019-06-27

## 2019-06-27 RX ORDER — DOCUSATE SODIUM 100 MG/1
100 CAPSULE, LIQUID FILLED ORAL DAILY
Status: DISCONTINUED | OUTPATIENT
Start: 2019-06-27 | End: 2019-06-29 | Stop reason: HOSPADM

## 2019-06-27 RX ORDER — POLYETHYLENE GLYCOL 3350 17 G/17G
17 POWDER, FOR SOLUTION ORAL DAILY
Status: DISCONTINUED | OUTPATIENT
Start: 2019-06-27 | End: 2019-06-29 | Stop reason: HOSPADM

## 2019-06-27 RX ADMIN — HYDROCODONE BITARTRATE AND ACETAMINOPHEN 2 TABLET: 5; 325 TABLET ORAL at 14:45

## 2019-06-27 RX ADMIN — PREDNISONE 5 MG: 5 TABLET ORAL at 09:20

## 2019-06-27 RX ADMIN — CALCIUM 500 MG: 500 TABLET ORAL at 09:20

## 2019-06-27 RX ADMIN — Medication 1 CAPSULE: at 09:20

## 2019-06-27 RX ADMIN — PAROXETINE HYDROCHLORIDE 20 MG: 20 TABLET, FILM COATED ORAL at 09:20

## 2019-06-27 RX ADMIN — ONDANSETRON 4 MG: 2 INJECTION INTRAMUSCULAR; INTRAVENOUS at 14:49

## 2019-06-27 RX ADMIN — DOCUSATE SODIUM 100 MG: 100 CAPSULE, LIQUID FILLED ORAL at 09:19

## 2019-06-27 RX ADMIN — CARVEDILOL 12.5 MG: 6.25 TABLET, FILM COATED ORAL at 09:20

## 2019-06-27 RX ADMIN — HYDROCODONE BITARTRATE AND ACETAMINOPHEN 2 TABLET: 5; 325 TABLET ORAL at 20:12

## 2019-06-27 RX ADMIN — PREDNISONE 2.5 MG: 5 TABLET ORAL at 20:12

## 2019-06-27 RX ADMIN — CYCLOBENZAPRINE HYDROCHLORIDE 20 MG: 10 TABLET, FILM COATED ORAL at 22:11

## 2019-06-27 RX ADMIN — POTASSIUM CHLORIDE 20 MEQ: 20 TABLET, EXTENDED RELEASE ORAL at 09:20

## 2019-06-27 RX ADMIN — RIVAROXABAN 10 MG: 10 TABLET, FILM COATED ORAL at 17:49

## 2019-06-27 RX ADMIN — VITAMIN D, TAB 1000IU (100/BT) 1000 UNITS: 25 TAB at 09:20

## 2019-06-27 RX ADMIN — ROPINIROLE HYDROCHLORIDE 1 MG: 1 TABLET, FILM COATED ORAL at 09:20

## 2019-06-27 RX ADMIN — ASPIRIN 81 MG: 81 TABLET ORAL at 09:20

## 2019-06-27 RX ADMIN — SENNOSIDES 8.6 MG: 8.6 TABLET, FILM COATED ORAL at 20:13

## 2019-06-27 RX ADMIN — OXYBUTYNIN CHLORIDE 15 MG: 10 TABLET, EXTENDED RELEASE ORAL at 09:20

## 2019-06-27 RX ADMIN — SODIUM CHLORIDE, PRESERVATIVE FREE 10 ML: 5 INJECTION INTRAVENOUS at 09:19

## 2019-06-27 RX ADMIN — PANTOPRAZOLE SODIUM 40 MG: 40 TABLET, DELAYED RELEASE ORAL at 09:20

## 2019-06-27 RX ADMIN — SODIUM CHLORIDE, PRESERVATIVE FREE 10 ML: 5 INJECTION INTRAVENOUS at 22:08

## 2019-06-27 RX ADMIN — HYDROCODONE BITARTRATE AND ACETAMINOPHEN 2 TABLET: 5; 325 TABLET ORAL at 00:22

## 2019-06-27 RX ADMIN — LEVOTHYROXINE SODIUM 75 MCG: 75 TABLET ORAL at 09:20

## 2019-06-27 RX ADMIN — FUROSEMIDE 40 MG: 10 INJECTION, SOLUTION INTRAMUSCULAR; INTRAVENOUS at 09:19

## 2019-06-27 RX ADMIN — PANTOPRAZOLE SODIUM 40 MG: 40 TABLET, DELAYED RELEASE ORAL at 17:49

## 2019-06-27 RX ADMIN — TRAZODONE HYDROCHLORIDE 200 MG: 100 TABLET ORAL at 22:11

## 2019-06-27 RX ADMIN — CALCIUM 500 MG: 500 TABLET ORAL at 20:12

## 2019-06-27 RX ADMIN — ATORVASTATIN CALCIUM 40 MG: 40 TABLET, FILM COATED ORAL at 20:12

## 2019-06-27 RX ADMIN — HYDROCODONE BITARTRATE AND ACETAMINOPHEN 2 TABLET: 5; 325 TABLET ORAL at 09:19

## 2019-06-27 RX ADMIN — POLYETHYLENE GLYCOL 3350 17 G: 17 POWDER, FOR SOLUTION ORAL at 12:40

## 2019-06-27 RX ADMIN — ROPINIROLE HYDROCHLORIDE 2 MG: 1 TABLET, FILM COATED ORAL at 20:12

## 2019-06-27 RX ADMIN — FLUTICASONE PROPIONATE 1 SPRAY: 50 SPRAY, METERED NASAL at 09:19

## 2019-06-27 RX ADMIN — CYCLOBENZAPRINE HYDROCHLORIDE 10 MG: 10 TABLET, FILM COATED ORAL at 09:20

## 2019-06-27 RX ADMIN — IPRATROPIUM BROMIDE AND ALBUTEROL SULFATE 1 AMPULE: .5; 3 SOLUTION RESPIRATORY (INHALATION) at 16:00

## 2019-06-27 RX ADMIN — OMEGA-3-ACID ETHYL ESTERS 1000 MG: 1 CAPSULE, LIQUID FILLED ORAL at 09:19

## 2019-06-27 RX ADMIN — LOSARTAN POTASSIUM 50 MG: 50 TABLET, FILM COATED ORAL at 09:20

## 2019-06-27 ASSESSMENT — PAIN SCALES - GENERAL
PAINLEVEL_OUTOF10: 7
PAINLEVEL_OUTOF10: 6
PAINLEVEL_OUTOF10: 7
PAINLEVEL_OUTOF10: 3
PAINLEVEL_OUTOF10: 8
PAINLEVEL_OUTOF10: 3
PAINLEVEL_OUTOF10: 7

## 2019-06-27 ASSESSMENT — PAIN DESCRIPTION - LOCATION: LOCATION: HIP

## 2019-06-27 ASSESSMENT — PAIN DESCRIPTION - PAIN TYPE: TYPE: ACUTE PAIN;SURGICAL PAIN

## 2019-06-27 ASSESSMENT — PAIN DESCRIPTION - ONSET: ONSET: OTHER (COMMENT)

## 2019-06-27 ASSESSMENT — PAIN DESCRIPTION - FREQUENCY: FREQUENCY: INTERMITTENT

## 2019-06-27 ASSESSMENT — PAIN DESCRIPTION - ORIENTATION: ORIENTATION: LEFT

## 2019-06-27 ASSESSMENT — PAIN - FUNCTIONAL ASSESSMENT: PAIN_FUNCTIONAL_ASSESSMENT: ACTIVITIES ARE NOT PREVENTED

## 2019-06-27 ASSESSMENT — PAIN DESCRIPTION - PROGRESSION: CLINICAL_PROGRESSION: GRADUALLY IMPROVING

## 2019-06-27 ASSESSMENT — PAIN DESCRIPTION - DESCRIPTORS: DESCRIPTORS: ACHING;SORE

## 2019-06-27 NOTE — PROGRESS NOTES
([facility] [I50.21]     [facility] exacerbation [facility] [J44.9]     New onset of congestive heart failure ([facility] [I50.9] 06/25/2019    Shortness of breath [R06.02] 06/25/2019    Bilateral lower extremity edema [R60.0] 06/25/2019       Electronically signed by Jaxson Jones MD on 6/27/2019 at 4:32 PM

## 2019-06-28 LAB
ANION GAP SERPL CALCULATED.3IONS-SCNC: 13 MEQ/L (ref 8–16)
BUN BLDV-MCNC: 31 MG/DL (ref 7–22)
CALCIUM SERPL-MCNC: 10.5 MG/DL (ref 8.5–10.5)
CHLORIDE BLD-SCNC: 100 MEQ/L (ref 98–111)
CO2: 29 MEQ/L (ref 23–33)
CREAT SERPL-MCNC: 1.2 MG/DL (ref 0.4–1.2)
GFR SERPL CREATININE-BSD FRML MDRD: 44 ML/MIN/1.73M2
GLUCOSE BLD-MCNC: 134 MG/DL (ref 70–108)
MAGNESIUM: 1.9 MG/DL (ref 1.6–2.4)
POTASSIUM SERPL-SCNC: 4.7 MEQ/L (ref 3.5–5.2)
SODIUM BLD-SCNC: 142 MEQ/L (ref 135–145)
T4 FREE: 1.78 NG/DL (ref 0.93–1.76)
TSH SERPL DL<=0.05 MIU/L-ACNC: 0.3 UIU/ML (ref 0.4–4.2)

## 2019-06-28 PROCEDURE — 2580000003 HC RX 258: Performed by: PHYSICIAN ASSISTANT

## 2019-06-28 PROCEDURE — 6370000000 HC RX 637 (ALT 250 FOR IP): Performed by: INTERNAL MEDICINE

## 2019-06-28 PROCEDURE — 94640 AIRWAY INHALATION TREATMENT: CPT

## 2019-06-28 PROCEDURE — 36415 COLL VENOUS BLD VENIPUNCTURE: CPT

## 2019-06-28 PROCEDURE — 80048 BASIC METABOLIC PNL TOTAL CA: CPT

## 2019-06-28 PROCEDURE — 6370000000 HC RX 637 (ALT 250 FOR IP): Performed by: PHYSICIAN ASSISTANT

## 2019-06-28 PROCEDURE — 97110 THERAPEUTIC EXERCISES: CPT

## 2019-06-28 PROCEDURE — 84443 ASSAY THYROID STIM HORMONE: CPT

## 2019-06-28 PROCEDURE — 94761 N-INVAS EAR/PLS OXIMETRY MLT: CPT

## 2019-06-28 PROCEDURE — 84439 ASSAY OF FREE THYROXINE: CPT

## 2019-06-28 PROCEDURE — 83735 ASSAY OF MAGNESIUM: CPT

## 2019-06-28 PROCEDURE — 99232 SBSQ HOSP IP/OBS MODERATE 35: CPT | Performed by: NURSE PRACTITIONER

## 2019-06-28 PROCEDURE — 1200000003 HC TELEMETRY R&B

## 2019-06-28 PROCEDURE — 99232 SBSQ HOSP IP/OBS MODERATE 35: CPT | Performed by: INTERNAL MEDICINE

## 2019-06-28 PROCEDURE — 97116 GAIT TRAINING THERAPY: CPT

## 2019-06-28 RX ORDER — PREDNISONE 1 MG/1
TABLET ORAL
Qty: 30 TABLET | Refills: 0
Start: 2019-06-28

## 2019-06-28 RX ORDER — CALCIUM CARBONATE 200(500)MG
500 TABLET,CHEWABLE ORAL 3 TIMES DAILY PRN
Status: DISCONTINUED | OUTPATIENT
Start: 2019-06-28 | End: 2019-06-29 | Stop reason: HOSPADM

## 2019-06-28 RX ORDER — PREDNISONE 1 MG/1
2.5 TABLET ORAL 2 TIMES DAILY
Qty: 30 TABLET | Refills: 0
Start: 2019-06-28 | End: 2019-06-28 | Stop reason: SDUPTHER

## 2019-06-28 RX ORDER — BUMETANIDE 1 MG/1
1 TABLET ORAL DAILY
Qty: 30 TABLET | Refills: 3 | Status: SHIPPED | OUTPATIENT
Start: 2019-06-29 | End: 2019-06-28

## 2019-06-28 RX ORDER — NITROGLYCERIN 0.4 MG/1
0.4 TABLET SUBLINGUAL EVERY 5 MIN PRN
Qty: 25 TABLET | Refills: 1 | Status: SHIPPED | OUTPATIENT
Start: 2019-06-28

## 2019-06-28 RX ORDER — LEVOTHYROXINE SODIUM 0.05 MG/1
50 TABLET ORAL DAILY
Status: DISCONTINUED | OUTPATIENT
Start: 2019-06-29 | End: 2019-06-29 | Stop reason: HOSPADM

## 2019-06-28 RX ORDER — LOSARTAN POTASSIUM 50 MG/1
25 TABLET ORAL 2 TIMES DAILY
Qty: 30 TABLET | Refills: 3 | Status: SHIPPED
Start: 2019-06-28

## 2019-06-28 RX ORDER — LEVOTHYROXINE SODIUM 0.05 MG/1
50 TABLET ORAL DAILY
Qty: 30 TABLET | Refills: 3 | Status: SHIPPED | OUTPATIENT
Start: 2019-06-29

## 2019-06-28 RX ORDER — HYDROCODONE BITARTRATE AND ACETAMINOPHEN 5; 325 MG/1; MG/1
1-2 TABLET ORAL
Qty: 50 TABLET | Refills: 0
Start: 2019-06-28 | End: 2019-07-05

## 2019-06-28 RX ORDER — BUMETANIDE 1 MG/1
0.5 TABLET ORAL DAILY
Qty: 30 TABLET | Refills: 3 | Status: SHIPPED | OUTPATIENT
Start: 2019-06-29

## 2019-06-28 RX ADMIN — SODIUM CHLORIDE, PRESERVATIVE FREE 10 ML: 5 INJECTION INTRAVENOUS at 20:49

## 2019-06-28 RX ADMIN — LOSARTAN POTASSIUM 50 MG: 50 TABLET, FILM COATED ORAL at 20:49

## 2019-06-28 RX ADMIN — HYDROCODONE BITARTRATE AND ACETAMINOPHEN 2 TABLET: 5; 325 TABLET ORAL at 14:35

## 2019-06-28 RX ADMIN — OMEGA-3-ACID ETHYL ESTERS 1000 MG: 1 CAPSULE, LIQUID FILLED ORAL at 08:18

## 2019-06-28 RX ADMIN — OXYBUTYNIN CHLORIDE 15 MG: 10 TABLET, EXTENDED RELEASE ORAL at 08:17

## 2019-06-28 RX ADMIN — ROPINIROLE HYDROCHLORIDE 2 MG: 1 TABLET, FILM COATED ORAL at 20:48

## 2019-06-28 RX ADMIN — PANTOPRAZOLE SODIUM 40 MG: 40 TABLET, DELAYED RELEASE ORAL at 16:40

## 2019-06-28 RX ADMIN — PANTOPRAZOLE SODIUM 40 MG: 40 TABLET, DELAYED RELEASE ORAL at 06:17

## 2019-06-28 RX ADMIN — POTASSIUM CHLORIDE 20 MEQ: 20 TABLET, EXTENDED RELEASE ORAL at 08:19

## 2019-06-28 RX ADMIN — SODIUM CHLORIDE, PRESERVATIVE FREE 10 ML: 5 INJECTION INTRAVENOUS at 08:17

## 2019-06-28 RX ADMIN — CYCLOBENZAPRINE HYDROCHLORIDE 20 MG: 10 TABLET, FILM COATED ORAL at 20:50

## 2019-06-28 RX ADMIN — PREDNISONE 2.5 MG: 5 TABLET ORAL at 08:19

## 2019-06-28 RX ADMIN — CALCIUM 500 MG: 500 TABLET ORAL at 08:18

## 2019-06-28 RX ADMIN — DOCUSATE SODIUM 100 MG: 100 CAPSULE, LIQUID FILLED ORAL at 08:19

## 2019-06-28 RX ADMIN — VITAMIN D, TAB 1000IU (100/BT) 1000 UNITS: 25 TAB at 08:18

## 2019-06-28 RX ADMIN — PREDNISONE 2.5 MG: 5 TABLET ORAL at 20:48

## 2019-06-28 RX ADMIN — PAROXETINE HYDROCHLORIDE 20 MG: 20 TABLET, FILM COATED ORAL at 08:18

## 2019-06-28 RX ADMIN — IPRATROPIUM BROMIDE AND ALBUTEROL SULFATE 1 AMPULE: .5; 3 SOLUTION RESPIRATORY (INHALATION) at 11:30

## 2019-06-28 RX ADMIN — RIVAROXABAN 10 MG: 10 TABLET, FILM COATED ORAL at 16:41

## 2019-06-28 RX ADMIN — FLUTICASONE PROPIONATE 1 SPRAY: 50 SPRAY, METERED NASAL at 08:19

## 2019-06-28 RX ADMIN — LIDOCAINE HYDROCHLORIDE: 20 SOLUTION ORAL; TOPICAL at 14:05

## 2019-06-28 RX ADMIN — HYDROCODONE BITARTRATE AND ACETAMINOPHEN 2 TABLET: 5; 325 TABLET ORAL at 20:49

## 2019-06-28 RX ADMIN — HYDROCODONE BITARTRATE AND ACETAMINOPHEN 2 TABLET: 5; 325 TABLET ORAL at 08:18

## 2019-06-28 RX ADMIN — ATORVASTATIN CALCIUM 40 MG: 40 TABLET, FILM COATED ORAL at 20:49

## 2019-06-28 RX ADMIN — IPRATROPIUM BROMIDE AND ALBUTEROL SULFATE 1 AMPULE: .5; 3 SOLUTION RESPIRATORY (INHALATION) at 07:47

## 2019-06-28 RX ADMIN — LEVOTHYROXINE SODIUM 75 MCG: 75 TABLET ORAL at 06:17

## 2019-06-28 RX ADMIN — ROPINIROLE HYDROCHLORIDE 1 MG: 1 TABLET, FILM COATED ORAL at 08:18

## 2019-06-28 RX ADMIN — ASPIRIN 81 MG: 81 TABLET ORAL at 08:18

## 2019-06-28 RX ADMIN — Medication 1 CAPSULE: at 08:18

## 2019-06-28 RX ADMIN — CARVEDILOL 6.25 MG: 6.25 TABLET, FILM COATED ORAL at 16:40

## 2019-06-28 RX ADMIN — IPRATROPIUM BROMIDE AND ALBUTEROL SULFATE 1 AMPULE: .5; 3 SOLUTION RESPIRATORY (INHALATION) at 21:09

## 2019-06-28 RX ADMIN — CALCIUM 500 MG: 500 TABLET ORAL at 20:49

## 2019-06-28 RX ADMIN — POLYETHYLENE GLYCOL 3350 17 G: 17 POWDER, FOR SOLUTION ORAL at 08:18

## 2019-06-28 RX ADMIN — ANTACID TABLETS 500 MG: 500 TABLET, CHEWABLE ORAL at 14:33

## 2019-06-28 RX ADMIN — SENNOSIDES 8.6 MG: 8.6 TABLET, FILM COATED ORAL at 20:48

## 2019-06-28 RX ADMIN — IPRATROPIUM BROMIDE AND ALBUTEROL SULFATE 1 AMPULE: .5; 3 SOLUTION RESPIRATORY (INHALATION) at 15:18

## 2019-06-28 RX ADMIN — CARVEDILOL 6.25 MG: 6.25 TABLET, FILM COATED ORAL at 08:18

## 2019-06-28 RX ADMIN — BUMETANIDE 1 MG: 1 TABLET ORAL at 08:18

## 2019-06-28 RX ADMIN — TRAZODONE HYDROCHLORIDE 200 MG: 100 TABLET ORAL at 20:50

## 2019-06-28 RX ADMIN — CYCLOBENZAPRINE HYDROCHLORIDE 10 MG: 10 TABLET, FILM COATED ORAL at 08:18

## 2019-06-28 ASSESSMENT — PAIN DESCRIPTION - LOCATION
LOCATION: HIP
LOCATION: HIP

## 2019-06-28 ASSESSMENT — PAIN DESCRIPTION - PROGRESSION
CLINICAL_PROGRESSION: GRADUALLY IMPROVING
CLINICAL_PROGRESSION: GRADUALLY IMPROVING

## 2019-06-28 ASSESSMENT — PAIN SCALES - GENERAL
PAINLEVEL_OUTOF10: 3
PAINLEVEL_OUTOF10: 7
PAINLEVEL_OUTOF10: 3
PAINLEVEL_OUTOF10: 7
PAINLEVEL_OUTOF10: 6
PAINLEVEL_OUTOF10: 7

## 2019-06-28 ASSESSMENT — PAIN DESCRIPTION - ONSET
ONSET: ON-GOING
ONSET: ON-GOING

## 2019-06-28 ASSESSMENT — PAIN DESCRIPTION - DESCRIPTORS
DESCRIPTORS: ACHING;SORE
DESCRIPTORS: ACHING;SORE

## 2019-06-28 ASSESSMENT — PAIN DESCRIPTION - PAIN TYPE
TYPE: SURGICAL PAIN
TYPE: SURGICAL PAIN

## 2019-06-28 ASSESSMENT — PAIN DESCRIPTION - FREQUENCY
FREQUENCY: CONTINUOUS
FREQUENCY: CONTINUOUS

## 2019-06-28 ASSESSMENT — PAIN DESCRIPTION - ORIENTATION
ORIENTATION: LEFT
ORIENTATION: LEFT

## 2019-06-28 NOTE — PROGRESS NOTES
[address] [place] [address]  INPATIENT PHYSICAL THERAPY  DAILY NOTE  [facility] MED SURG 8B - 8B-31/031-A      Time In: 6864  Time Out: 0913  Timed Code Treatment Minutes: 23 Minutes  Minutes: 23          Date: 2019  Patient Name: Cesia Arguelles,  Gender:  female        MRN: [id number]  : 1950  (73 y.o.)     Referring Practitioner: Yogi Villagomez PA-C  Diagnosis: new onset of congestive heart failure   Additional Pertinent Hx: Per EMR:\" 68 y.o. female with PMH of CABG, HTN, CAD, and COPD who presented to [address] [place] [address] with complaint of SOB and lower extremity swelling that has been gradually worsening over the past 2 days. Pt is s/p left hip replacement with Dr. Cornelio Pérez last  and pt states that she feels like she has been declining since then as she Gleopal Batista' felt good\" and has not been up walking around as much. Pt notes that she is a former smoker and quit in . Pt denies HA/dizziness/fever/chills/CP/palpitations/n/v/d/dysuria/numbness/tingling. She notes that she is constipated but that this is a chronic issue resulting from her IBS. \"     Prior Level of Function:  Lives With: Spouse  Type of Home: House  Home Layout: One level  Home Access: Stairs to enter without rails  Entrance Stairs - Number of Steps: 1(platform)  Home Equipment: Rolling walker    Restrictions/Precautions:  Restrictions/Precautions: Weight Bearing  Left Lower Extremity Weight Bearing: Weight Bearing As Tolerated  Position Activity Restriction  Hip Precautions: No hip flexion > 90 degrees, No hip internal rotation, Posterior hip precautions, No ADduction    SUBJECTIVE: RN approved session. Pt sleeping in bed at arrival. Pt pleasant and agreeable to session. Pt states she is feeling much better this date.      PAIN: 6/10:       OBJECTIVE:  Bed Mobility:  Rolling to Left: Stand By Assistance   Supine to Sit: Stand By Assistance  Scooting: Stand By Assistance    Transfers:  Sit to Stand: [employer]  Stand to

## 2019-06-28 NOTE — PLAN OF CARE
Problem: Falls - Risk of:  Goal: Will remain free from falls  Description  Will remain free from falls  6/27/2019 2234 by Demond Quinones RN  Outcome: Ongoing  Note:   Patient has nonskid footwear on and is up with help of staff. Uses a 4 wheel walker from home and uses her call light appropriately. Problem: OXYGENATION/RESPIRATORY FUNCTION  Goal: Patient will achieve/maintain normal respiratory rate/effort  Description  Respiratory rate and effort will be within normal limits for the patient  6/27/2019 2234 by Demond Quinones RN  Outcome: Ongoing  Note:   Patient is encouraged to cough and deep breathe to keep oxygen saturation up. She also has an incentive spirometer at the bedside. Problem: Pain - Acute:  Goal: Pain level will decrease  Description  Pain level will decrease  6/27/2019 2234 by Demond Quinones RN  Outcome: Ongoing  Note:   Patient is taking PRN norco for pain and has scheduled flexeril to assist with pain and muscle spasm. Problem: Pain:  Goal: Pain level will decrease  Description  Pain level will decrease  6/27/2019 2234 by Demond Quinones RN  Outcome: Ongoing  Note:   Patient is taking PRN norco for pain and has scheduled flexeril to assist with pain and muscle spasm. Problem: HEMODYNAMIC STATUS  Goal: Patient has stable vital signs and fluid balance  6/27/2019 2234 by Demond Quinones RN  Note:   Patients vitals are within normal ranges and are stable. Care plan reviewed with patient. Patient verbalize understanding of the plan of care and contribute to goal setting.

## 2019-06-28 NOTE — PROGRESS NOTES
Hospitalist Progress Note    Patient:  Lisa Feliciano      Unit/Bed:8B-31/031-A    YOB: 1950    MRN: [id number]       Acct: [de-identified]     PCP: Ankush Cote MD    Date of Admission: 6/25/2019    Assessment/Plan:    New onset systolic congestive heart failure, EF of 30-35%: Continue IV diuretics, appreciate cardiology input  Lungs fairly clear, edema in the legs almost resolved  Recently had stress test was negative for ischemia  Stop Norvasc 5 mg and increase Coreg to 12.5 mg twice daily and continue Cozaar and Lasix  6/27-with aggressive diuresis, blood pressure little low, decreased Coreg to 6.25 mg twice daily and continue Cozaar , received 1 dose of Lasix 40 mg IV today, hold Lasix and start Bumex 1 mg daily from tomorrow  -We will follow with cardiology to see if the patient will need LifeVest, patient follows with [facility]ologist [facility] but prefers to follow here a for now    6/28-no evidence of volume overload currently, diuretics decreased further to 0.5 mg of Bumex daily and blood pressure is better  Cardiology recommended LifeVest, awaiting for LifeVest placement       left knee pain - suspicion for osteoarthritis, no obvious abnormality on x-ray      Coronary artery disease status post CABG, 2004: No chest pain, will benefit from angiogram at some time, recently had stress test: Aspirin, statin, beta blocker    Essential hypertension: Stop Norvasc, increase Coreg and continue Cozaar  Blood pressure trending down to low 90s with increased Coreg, decreased to 6.25 mg twice daily    Hyperlipidemia-statin  COPD/emphysema without exacerbation: Bronchodilators as needed   Acquired hypothyroidism:  On levothyroxine  Decrease dose as the TSH is low and free T4 is little high    Gastroesophageal reflux disease-PPI    Restless leg syndrome- on ropinirole    Anxiety disorder, continue Paxil    History of recent left hip arthroplasty, weak ago, continues or alto        Expected discharge date: abnormality. **This report has been created using voice recognition software. It may contain minor errors which are inherent in voice recognition technology. **      Final report electronically signed by Dr. Kaylie Castellano MD on 6/25/2019 5:35 PM          DVT prophylaxis: [] Lovenox                                 [] SCDs                                 [] SQ Heparin                                 [x] Encourage ambulation           [] Already on Anticoagulation     Code Status: Full Code    PT/OT Eval Status: ordered    Tele:   [x] yes             [] no    Active Hospital Problems    Diagnosis Date Noted    Acute systolic CHF (congestive heart failure) ([facility] [I50.21]     COPD without exacerbation ([facility] [J44.9]     New onset of congestive heart failure ([facility] [I50.9] 06/25/2019    Shortness of breath [R06.02] 06/25/2019    Bilateral lower extremity edema [R60.0] 06/25/2019       Electronically signed by Lawanda Moulton MD on 6/28/2019 at 7:37 AM

## 2019-06-28 NOTE — PROGRESS NOTES
Cardiology Progress Note      Patient:  Khanh Valencia  YOB: 1950  MRN: [id number]   Acct: [de-identified]  Admit Date:  6/25/2019  Primary Cardiologist: cardiology in [place]  Seen by Dr. Miguel Calzada     Per prior cardiology consult note-  PRIMARY CARDIOLOGIST:  She follows with Kenny Logan.     REASON OF CONSULTATION:  Congestive heart failure five to six days  postop.     HISTORY OF PRESENT ILLNESS:  This is a 49-year-old pleasant   female patient known to have coronary artery disease, status post  previous bypass 2004, hypertension, COPD, was in usual state of health  until two days prior to her admission. In the last two days prior to  admission, she was having worsening of shortness of breath, orthopnea of  three pillows and leg swelling and that progressively got worse and  finally decided to come to the emergency room. The patient has been  having mild short of breath and mild swelling prior to her hip surgery. The patient has a left hip replacement by Dr. Lianne Clement on 06/20/2019 and  was discharged home just recently and after the surgery, the patient  stated that she is progressively getting more short of breath and  feeling tired, weak and orthopneic. Leg swelling progressively got  worse in the last couple of days, it got worse and decided to come to  the emergency room yesterday and found to be in congestive heart failure  and fluid overload, so admitted and started on diuretics. As I said,  the patient has history of mild short of breath and leg swelling prior  to even the surgery but after surgery, it got dramatically worse. No  chest pain. Denied any fever or chills. No nausea, vomiting, diarrhea. No dysphagia, urgency or frequency.     Subjective (Events in last 24 hours):     Pt sitting up in chair - no chest pain or SOB c/o  recent hip surgery nd is recovering w/ rehab - she presented with SOB and Le swelling - all resolved now    She doesn't know her EF - never had ICD mentioned

## 2019-06-28 NOTE — CARE COORDINATION
6/28/19  11:50 AM    Life Vest ordered per Cardiology. EF 31% per echo. This CM RN notified [facility]. Information faxed to [facility] and confirmation of transmission received. Will await further info from [facility].

## 2019-06-29 VITALS
SYSTOLIC BLOOD PRESSURE: 114 MMHG | TEMPERATURE: 98.1 F | WEIGHT: 129.6 LBS | OXYGEN SATURATION: 92 % | DIASTOLIC BLOOD PRESSURE: 53 MMHG | HEART RATE: 97 BPM | BODY MASS INDEX: 23.85 KG/M2 | RESPIRATION RATE: 16 BRPM | HEIGHT: 62 IN

## 2019-06-29 LAB
ANION GAP SERPL CALCULATED.3IONS-SCNC: 14 MEQ/L (ref 8–16)
BUN BLDV-MCNC: 31 MG/DL (ref 7–22)
CALCIUM SERPL-MCNC: 10.2 MG/DL (ref 8.5–10.5)
CHLORIDE BLD-SCNC: 97 MEQ/L (ref 98–111)
CO2: 29 MEQ/L (ref 23–33)
CREAT SERPL-MCNC: 1 MG/DL (ref 0.4–1.2)
GFR SERPL CREATININE-BSD FRML MDRD: 55 ML/MIN/1.73M2
GLUCOSE BLD-MCNC: 128 MG/DL (ref 70–108)
MAGNESIUM: 2.2 MG/DL (ref 1.6–2.4)
POTASSIUM SERPL-SCNC: 4.2 MEQ/L (ref 3.5–5.2)
PRO-BNP: 337.5 PG/ML (ref 0–900)
SODIUM BLD-SCNC: 140 MEQ/L (ref 135–145)

## 2019-06-29 PROCEDURE — 6370000000 HC RX 637 (ALT 250 FOR IP): Performed by: INTERNAL MEDICINE

## 2019-06-29 PROCEDURE — 83880 ASSAY OF NATRIURETIC PEPTIDE: CPT

## 2019-06-29 PROCEDURE — 83735 ASSAY OF MAGNESIUM: CPT

## 2019-06-29 PROCEDURE — 2580000003 HC RX 258: Performed by: PHYSICIAN ASSISTANT

## 2019-06-29 PROCEDURE — 94640 AIRWAY INHALATION TREATMENT: CPT

## 2019-06-29 PROCEDURE — 99238 HOSP IP/OBS DSCHRG MGMT 30/<: CPT | Performed by: INTERNAL MEDICINE

## 2019-06-29 PROCEDURE — 6370000000 HC RX 637 (ALT 250 FOR IP): Performed by: PHYSICIAN ASSISTANT

## 2019-06-29 PROCEDURE — 36415 COLL VENOUS BLD VENIPUNCTURE: CPT

## 2019-06-29 PROCEDURE — 2709999900 HC NON-CHARGEABLE SUPPLY

## 2019-06-29 PROCEDURE — 80048 BASIC METABOLIC PNL TOTAL CA: CPT

## 2019-06-29 RX ORDER — BUMETANIDE 1 MG/1
0.5 TABLET ORAL DAILY
Status: DISCONTINUED | OUTPATIENT
Start: 2019-06-29 | End: 2019-06-29 | Stop reason: HOSPADM

## 2019-06-29 RX ADMIN — BUMETANIDE 0.5 MG: 1 TABLET ORAL at 08:02

## 2019-06-29 RX ADMIN — CARVEDILOL 6.25 MG: 6.25 TABLET, FILM COATED ORAL at 08:02

## 2019-06-29 RX ADMIN — PANTOPRAZOLE SODIUM 40 MG: 40 TABLET, DELAYED RELEASE ORAL at 06:34

## 2019-06-29 RX ADMIN — PREDNISONE 2.5 MG: 5 TABLET ORAL at 08:02

## 2019-06-29 RX ADMIN — CYCLOBENZAPRINE HYDROCHLORIDE 10 MG: 10 TABLET, FILM COATED ORAL at 08:02

## 2019-06-29 RX ADMIN — SODIUM CHLORIDE, PRESERVATIVE FREE 10 ML: 5 INJECTION INTRAVENOUS at 08:06

## 2019-06-29 RX ADMIN — POTASSIUM CHLORIDE 20 MEQ: 20 TABLET, EXTENDED RELEASE ORAL at 08:02

## 2019-06-29 RX ADMIN — ROPINIROLE HYDROCHLORIDE 1 MG: 1 TABLET, FILM COATED ORAL at 08:01

## 2019-06-29 RX ADMIN — Medication 1 CAPSULE: at 08:02

## 2019-06-29 RX ADMIN — VITAMIN D, TAB 1000IU (100/BT) 1000 UNITS: 25 TAB at 08:02

## 2019-06-29 RX ADMIN — OXYBUTYNIN CHLORIDE 15 MG: 10 TABLET, EXTENDED RELEASE ORAL at 08:01

## 2019-06-29 RX ADMIN — LOSARTAN POTASSIUM 50 MG: 50 TABLET, FILM COATED ORAL at 08:01

## 2019-06-29 RX ADMIN — CALCIUM 500 MG: 500 TABLET ORAL at 08:06

## 2019-06-29 RX ADMIN — FLUTICASONE PROPIONATE 1 SPRAY: 50 SPRAY, METERED NASAL at 08:01

## 2019-06-29 RX ADMIN — ANTACID TABLETS 500 MG: 500 TABLET, CHEWABLE ORAL at 08:02

## 2019-06-29 RX ADMIN — POLYETHYLENE GLYCOL 3350 17 G: 17 POWDER, FOR SOLUTION ORAL at 08:01

## 2019-06-29 RX ADMIN — HYDROCODONE BITARTRATE AND ACETAMINOPHEN 2 TABLET: 5; 325 TABLET ORAL at 04:12

## 2019-06-29 RX ADMIN — OMEGA-3-ACID ETHYL ESTERS 1000 MG: 1 CAPSULE, LIQUID FILLED ORAL at 08:07

## 2019-06-29 RX ADMIN — ASPIRIN 81 MG: 81 TABLET ORAL at 08:02

## 2019-06-29 RX ADMIN — PAROXETINE HYDROCHLORIDE 20 MG: 20 TABLET, FILM COATED ORAL at 08:01

## 2019-06-29 RX ADMIN — DOCUSATE SODIUM 100 MG: 100 CAPSULE, LIQUID FILLED ORAL at 08:02

## 2019-06-29 RX ADMIN — HYDROCODONE BITARTRATE AND ACETAMINOPHEN 2 TABLET: 5; 325 TABLET ORAL at 10:17

## 2019-06-29 RX ADMIN — IPRATROPIUM BROMIDE AND ALBUTEROL SULFATE 1 AMPULE: .5; 3 SOLUTION RESPIRATORY (INHALATION) at 07:37

## 2019-06-29 RX ADMIN — LEVOTHYROXINE SODIUM 50 MCG: 50 TABLET ORAL at 06:34

## 2019-06-29 ASSESSMENT — PAIN SCALES - GENERAL
PAINLEVEL_OUTOF10: 2
PAINLEVEL_OUTOF10: 5
PAINLEVEL_OUTOF10: 7
PAINLEVEL_OUTOF10: 7

## 2019-06-29 NOTE — DISCHARGE SUMMARY
increase in the dose of Coreg. With diuresis patient's oxygenation improved and she never dropped below 89% and mostly is always in the low 90s. Patient was also complaining of some epigastric discomfort and was recommended to stop and NSAIDs and continue her Protonix. Serial troponins were negative. TSH was mildly low at 0.304 and free T4 was mildly high at 1.78 and her levothyroxine dose was decreased to 50 mcg from 75 mcg. Cardiologist recommended a LifeVest and patient had one fitted before being discharged and recommended to follow with her cardiologist.  Patient is not sure when she is going back to her cardiologist if not recommended to follow with a cardiologist here and might need ischemic work-up.       Exam:     Vitals:  Vitals:    06/28/19 2109 06/29/19 0007 06/29/19 0420 06/29/19 0753   BP:  (!) 127/58 (!) 111/56 (!) 114/53   Pulse:  86 90 97   Resp: 14 14 14 16   Temp:  98.2 °F (36.8 °C) 98.4 °F (36.9 °C) 98.1 °F (36.7 °C)   TempSrc:  Oral Oral Oral   SpO2: 91% 92% 93% 92%   Weight:       Height:         Weight: Weight: 129 lb 9.6 oz (58.8 kg)     24 hour intake/output:    Intake/Output Summary (Last 24 hours) at 6/29/2019 4338  Last data filed at 6/29/2019 4597  Gross per 24 hour   Intake 850 ml   Output 500 ml   Net 350 ml         Physical Examination:   General appearance - alert, awake  and in no distress  HEENT: Normocephalic, Atraumatic, pupils reactive, mucous membranes moist  Chest - moving equally to respiration,symmetric air entry, clear to auscultation  Heart - normal rate, regular rhythm, normal S1, S2, no murmurs,   Abdomen - soft, nontender, nondistended, no masses or organomegaly, BS+  Neurological - alert, oriented, normal speech, sensations intact and able to move all extremities   Extremities - peripheral pulses normal, no pedal edema,  Capillary refill less than 3 sec  Skin - normal coloration and turgor, no rashes  Left hip area surgery site healing well, mild ecchymosis noted        Labs: For convenience and continuity at follow-up the following most recent labs are provided:      CBC:    Lab Results   Component Value Date    WBC 9.6 06/27/2019    HGB 10.5 06/27/2019    HCT 33.2 06/27/2019     06/27/2019       Renal:    Lab Results   Component Value Date     06/29/2019    K 4.2 06/29/2019    CL 97 06/29/2019    CO2 29 06/29/2019    BUN 31 06/29/2019    CREATININE 1.0 06/29/2019    CALCIUM 10.2 06/29/2019         Significant Diagnostic Studies    Radiology:   XR CHEST STANDARD (2 VW)   Final Result   There is no acute intrathoracic process. **This report has been created using voice recognition software. It may contain minor errors which are inherent in voice recognition technology. **      Final report electronically signed by Dr Devonte Garner on 6/27/2019 3:59 PM      XR KNEE LEFT (1-2 VIEWS)   Final Result   Normal left knee. **This report has been created using voice recognition software. It may contain minor errors which are inherent in voice recognition technology. **      Final report electronically signed by Dr. Qasim Fam on 6/26/2019 3:44 PM      XR CHEST PORTABLE   Final Result   1. Normal heart size. Metallic sternotomy sutures and vascular clips from prior surgery. 2. Minimal atelectatic/fibrotic stranding in the lingula. 3. Otherwise normal chest. No acute infiltrates or effusions are seen. No pulmonary vascular congestion. **This report has been created using voice recognition software. It may contain minor errors which are inherent in voice recognition technology. **      Final report electronically signed by Dr. Qasim Fam on 6/26/2019 10:43 AM      VL DUP LOWER EXTREMITY VENOUS LEFT   Final Result      1. No evidence of deep venous thrombosis in the left lower extremity. 2. Prominent edema in the left calf. **This report has been created using voice recognition software.  It may contain

## 2019-06-30 ENCOUNTER — CARE COORDINATION (OUTPATIENT)
Dept: CASE MANAGEMENT | Age: 69
End: 2019-06-30

## 2019-07-01 ENCOUNTER — PREP FOR PROCEDURE (OUTPATIENT)
Dept: CARDIOLOGY | Age: 69
End: 2019-07-01

## 2019-07-01 RX ORDER — SODIUM CHLORIDE 9 MG/ML
INJECTION, SOLUTION INTRAVENOUS CONTINUOUS
Status: CANCELLED | OUTPATIENT
Start: 2019-07-01

## 2019-07-01 RX ORDER — ASPIRIN 325 MG
325 TABLET ORAL ONCE
Status: CANCELLED | OUTPATIENT
Start: 2019-07-01 | End: 2019-07-01

## 2019-07-01 RX ORDER — DIPHENHYDRAMINE HYDROCHLORIDE 50 MG/ML
50 INJECTION INTRAMUSCULAR; INTRAVENOUS ONCE
Status: CANCELLED | OUTPATIENT
Start: 2019-07-01 | End: 2019-07-01

## 2019-07-01 RX ORDER — SODIUM CHLORIDE 0.9 % (FLUSH) 0.9 %
10 SYRINGE (ML) INJECTION EVERY 12 HOURS SCHEDULED
Status: CANCELLED | OUTPATIENT
Start: 2019-07-01

## 2019-07-01 RX ORDER — SODIUM CHLORIDE 0.9 % (FLUSH) 0.9 %
10 SYRINGE (ML) INJECTION PRN
Status: CANCELLED | OUTPATIENT
Start: 2019-07-01

## 2019-07-01 RX ORDER — NITROGLYCERIN 0.4 MG/1
0.4 TABLET SUBLINGUAL EVERY 5 MIN PRN
Status: CANCELLED | OUTPATIENT
Start: 2019-07-01

## 2019-07-02 ENCOUNTER — HOSPITAL ENCOUNTER (OUTPATIENT)
Dept: INPATIENT UNIT | Age: 69
Discharge: HOME | End: 2019-07-02
Attending: INTERNAL MEDICINE | Admitting: INTERNAL MEDICINE
Payer: MEDICARE

## 2019-07-02 ENCOUNTER — APPOINTMENT (OUTPATIENT)
Dept: CARDIAC CATH/INVASIVE PROCEDURES | Age: 69
End: 2019-07-02
Attending: INTERNAL MEDICINE
Payer: MEDICARE

## 2019-07-02 VITALS
SYSTOLIC BLOOD PRESSURE: 87 MMHG | RESPIRATION RATE: 16 BRPM | HEART RATE: 106 BPM | HEIGHT: 62 IN | TEMPERATURE: 98.1 F | BODY MASS INDEX: 23.92 KG/M2 | OXYGEN SATURATION: 92 % | DIASTOLIC BLOOD PRESSURE: 56 MMHG | WEIGHT: 130 LBS

## 2019-07-02 LAB
ABO: NORMAL
ANION GAP SERPL CALCULATED.3IONS-SCNC: 15 MEQ/L (ref 8–16)
ANTIBODY SCREEN: NORMAL
APTT: 28.9 SECONDS (ref 22–38)
BUN BLDV-MCNC: 30 MG/DL (ref 7–22)
CALCIUM SERPL-MCNC: 9.7 MG/DL (ref 8.5–10.5)
CHLORIDE BLD-SCNC: 102 MEQ/L (ref 98–111)
CO2: 24 MEQ/L (ref 23–33)
CREAT SERPL-MCNC: 1.3 MG/DL (ref 0.4–1.2)
EKG ATRIAL RATE: 85 BPM
EKG P AXIS: 65 DEGREES
EKG P-R INTERVAL: 162 MS
EKG Q-T INTERVAL: 396 MS
EKG QRS DURATION: 128 MS
EKG QTC CALCULATION (BAZETT): 471 MS
EKG R AXIS: 95 DEGREES
EKG T AXIS: -49 DEGREES
EKG VENTRICULAR RATE: 85 BPM
ERYTHROCYTE [DISTWIDTH] IN BLOOD BY AUTOMATED COUNT: 14.7 % (ref 11.5–14.5)
ERYTHROCYTE [DISTWIDTH] IN BLOOD BY AUTOMATED COUNT: 55.7 FL (ref 35–45)
GFR SERPL CREATININE-BSD FRML MDRD: 40 ML/MIN/1.73M2
GLUCOSE BLD-MCNC: 141 MG/DL (ref 70–108)
HCT VFR BLD CALC: 33.8 % (ref 37–47)
HEMOGLOBIN: 10.5 GM/DL (ref 12–16)
INR BLD: 0.93 (ref 0.85–1.13)
MCH RBC QN AUTO: 32.2 PG (ref 26–33)
MCHC RBC AUTO-ENTMCNC: 31.1 GM/DL (ref 32.2–35.5)
MCV RBC AUTO: 103.7 FL (ref 81–99)
PLATELET # BLD: 563 THOU/MM3 (ref 130–400)
PMV BLD AUTO: 8.8 FL (ref 9.4–12.4)
POTASSIUM REFLEX MAGNESIUM: 3.8 MEQ/L (ref 3.5–5.2)
RBC # BLD: 3.26 MILL/MM3 (ref 4.2–5.4)
RH FACTOR: NORMAL
SODIUM BLD-SCNC: 141 MEQ/L (ref 135–145)
WBC # BLD: 10.9 THOU/MM3 (ref 4.8–10.8)

## 2019-07-02 PROCEDURE — 6360000002 HC RX W HCPCS

## 2019-07-02 PROCEDURE — C1894 INTRO/SHEATH, NON-LASER: HCPCS

## 2019-07-02 PROCEDURE — 2709999900 HC NON-CHARGEABLE SUPPLY

## 2019-07-02 PROCEDURE — 85027 COMPLETE CBC AUTOMATED: CPT

## 2019-07-02 PROCEDURE — 86850 RBC ANTIBODY SCREEN: CPT

## 2019-07-02 PROCEDURE — 2580000003 HC RX 258: Performed by: NURSE PRACTITIONER

## 2019-07-02 PROCEDURE — 80048 BASIC METABOLIC PNL TOTAL CA: CPT

## 2019-07-02 PROCEDURE — 93458 L HRT ARTERY/VENTRICLE ANGIO: CPT | Performed by: INTERNAL MEDICINE

## 2019-07-02 PROCEDURE — 36415 COLL VENOUS BLD VENIPUNCTURE: CPT

## 2019-07-02 PROCEDURE — 93005 ELECTROCARDIOGRAM TRACING: CPT | Performed by: NURSE PRACTITIONER

## 2019-07-02 PROCEDURE — 85730 THROMBOPLASTIN TIME PARTIAL: CPT

## 2019-07-02 PROCEDURE — C1769 GUIDE WIRE: HCPCS

## 2019-07-02 PROCEDURE — 2500000003 HC RX 250 WO HCPCS

## 2019-07-02 PROCEDURE — 93010 ELECTROCARDIOGRAM REPORT: CPT | Performed by: INTERNAL MEDICINE

## 2019-07-02 PROCEDURE — 86900 BLOOD TYPING SEROLOGIC ABO: CPT

## 2019-07-02 PROCEDURE — 6360000004 HC RX CONTRAST MEDICATION: Performed by: INTERNAL MEDICINE

## 2019-07-02 PROCEDURE — 85610 PROTHROMBIN TIME: CPT

## 2019-07-02 PROCEDURE — 86901 BLOOD TYPING SEROLOGIC RH(D): CPT

## 2019-07-02 RX ORDER — NITROGLYCERIN 0.4 MG/1
0.4 TABLET SUBLINGUAL EVERY 5 MIN PRN
Status: DISCONTINUED | OUTPATIENT
Start: 2019-07-02 | End: 2019-07-02 | Stop reason: HOSPADM

## 2019-07-02 RX ORDER — SODIUM CHLORIDE 0.9 % (FLUSH) 0.9 %
10 SYRINGE (ML) INJECTION PRN
Status: DISCONTINUED | OUTPATIENT
Start: 2019-07-02 | End: 2019-07-02 | Stop reason: HOSPADM

## 2019-07-02 RX ORDER — SODIUM CHLORIDE 0.9 % (FLUSH) 0.9 %
10 SYRINGE (ML) INJECTION EVERY 12 HOURS SCHEDULED
Status: DISCONTINUED | OUTPATIENT
Start: 2019-07-02 | End: 2019-07-02 | Stop reason: HOSPADM

## 2019-07-02 RX ORDER — SODIUM CHLORIDE 9 MG/ML
75 INJECTION, SOLUTION INTRAVENOUS CONTINUOUS
Status: ACTIVE | OUTPATIENT
Start: 2019-07-02 | End: 2019-07-02

## 2019-07-02 RX ORDER — ACETAMINOPHEN 325 MG/1
650 TABLET ORAL EVERY 4 HOURS PRN
Status: DISCONTINUED | OUTPATIENT
Start: 2019-07-02 | End: 2019-07-02 | Stop reason: HOSPADM

## 2019-07-02 RX ORDER — ONDANSETRON 2 MG/ML
4 INJECTION INTRAMUSCULAR; INTRAVENOUS EVERY 6 HOURS PRN
Status: DISCONTINUED | OUTPATIENT
Start: 2019-07-02 | End: 2019-07-02 | Stop reason: HOSPADM

## 2019-07-02 RX ORDER — SODIUM CHLORIDE 0.9 % (FLUSH) 0.9 %
10 SYRINGE (ML) INJECTION PRN
Status: DISCONTINUED | OUTPATIENT
Start: 2019-07-02 | End: 2019-07-02 | Stop reason: SDUPTHER

## 2019-07-02 RX ORDER — ASPIRIN 325 MG
325 TABLET ORAL ONCE
Status: DISCONTINUED | OUTPATIENT
Start: 2019-07-02 | End: 2019-07-02 | Stop reason: HOSPADM

## 2019-07-02 RX ORDER — SODIUM CHLORIDE 9 MG/ML
INJECTION, SOLUTION INTRAVENOUS CONTINUOUS
Status: DISCONTINUED | OUTPATIENT
Start: 2019-07-02 | End: 2019-07-02 | Stop reason: SDUPTHER

## 2019-07-02 RX ADMIN — SODIUM CHLORIDE: 9 INJECTION, SOLUTION INTRAVENOUS at 08:07

## 2019-07-02 RX ADMIN — IOPAMIDOL 40 ML: 755 INJECTION, SOLUTION INTRAVENOUS at 09:58

## 2019-07-02 ASSESSMENT — PAIN SCALES - GENERAL: PAINLEVEL_OUTOF10: 0

## 2019-07-02 NOTE — OP NOTE
[facility]  Sedation/Analgesia Post Sedation Record        Pt Name: Khanh Valencia  MRN: [id number]  YOB: 1950  Procedure Performed By: Jihan Moncada MD  Primary Care Physician: Jeanette Barahona MD        POST-PROCEDURE    Physicians/Assistants: Jihan oMncada MD    Procedure Performed:  Left heart cath, LVEDP                                  Sedation/Anesthesia:  Local Anesthesia and IV Conscious Sedation with continuous O2 monitoring    Estimated Blood Loss: 10 cc     Specimens Removed:  [x]None []Other:      Disposition of Specimen:  []Pathology []Other        Complications:   [x]None Immediate []Other:       Post Procedure Diagnosis/Findings:    Non-obstructive Coronary Artery Disease   LVEDP 4mm Hg         Recommendations:    Optimize heart failure therapies                 Jihan Moncada MD  Electronically signed 7/2/2019 at 10:10 AM

## 2019-07-02 NOTE — PROCEDURES
[address] [place] [address]                            CARDIAC CATHETERIZATION    PATIENT NAME: Megha Urbina                      :        1950  MED REC NO:   [id number]                           ROOM:       0009  ACCOUNT NO:   [de-identified]                           ADMIT DATE: 2019  PROVIDER:     Austen Infante MD    DATE OF PROCEDURE:  2019    PROCEDURE PERFORMED:  Left heart catheterization, LVEDP measurement. PROCEDURE DETAILS:  After a written informed consent was obtained, the  patient was brought to the cardiac catheterization laboratory in a  fasting, nonsedated, and in no acute distress. A pre-procedure time-out  was performed. 2% lidocaine was used to anesthetize the subcutaneous  tissue overlying the right femoral artery. Using the modified Seldinger  technique, we placed a 5-Tamazight arterial sheath in the right common  femoral artery after verifying the puncture site under fluoroscopy. Once the sheath was in place, it was flushed appropriately. EQUIPMENTS USED:  Standard 3DRC, JL4 diagnostic catheters. The right common femoral angiogram was performed, which showed good  common femoral artery stick with no obvious dissections or perforations  or high or low fix. CORONARY ANATOMY:  1. Right coronary artery appears to be nondominant. There is mild  luminal irregularities in the proximal, mid, and distal portions of the  right coronary artery. 2.  Left main artery is patent with no significant disease and gives  rise to LAD and left circumflex arteries. 3.  Left circumflex artery is large dominant system. There is no  significant disease in the proximal, mid, or distal portions. 4.  Left anterior descending artery. There is mild luminal  irregularities at the proximal, mid portions. Distally, there are mild  luminal irregularities of the LAD, wraps around the apex of the heart.   5.  The

## 2019-07-02 NOTE — H&P
Sedation/Analgesia History & Physical      Pt Name: Aliya Luis  MRN: [id number]  YOB: 1950  Provider Performing Procedure: Daniela De León MD, Nany Wilcox  Primary Care Physician: Roylene Peabody, MD      PRE-PROCEDURE   DNR-CCA/DNR-CC []Yes [x]No      PLANNED PROCEDURE     [x]Cath  [x]PCI                []Pacemaker/AICD  []JOHN             []Cardioversion []Peripheral angiography/PTA  []Other:        Consent:   The indication, risks and benefits of the procedure and possible therapeutic consequences and alternatives were discussed with the patient. The patient was given the opportunity to ask questions and to have them answered to his/her satisfaction. Risks of the procedure include but are not limited to the following: Bleeding, hematoma including retroperitoneal hematoma, infection, pain and discomfort, injury to the aorta and other blood vessels, rhythm disturbance, low blood pressure, myocardial infarction, stroke, kidney damage/failure, myocardial perforation, allergic reactions to sedatives/contrast material, loss of pulse/vascular compromise requiring surgery, aneurysm/pseudoaneurysm formation, possible loss of a limb/hand/leg, death. Alternatives to and omission of the suggested procedure were discussed. The patient had no further questions and wished to proceed; the consent form was signed. Indications for the Procedure:     CAD Presentation:  Worsening Angina, LV dysfunction    Anginal Classification within 2 weeks:  CCS III - Symptoms with everyday living activities, i.e., moderate limitation    NYHA Heart Failure Class within 2 weeks: Class IV - Symptoms of HF at rest, Newly Diagnosed?  Yes, Heart Failure Type: Systolic      Anti- Anginal Meds within 2 weeks:   ANTI-ANGINAL MEDS: Yes: Beta Blockers, Long Acting Nitrates (Any), Aspirin and Statin (Any)    Stress or Imaging Studies Performed:  None    CABG:yes            MEDICAL HISTORY        Past Medical History:   Diagnosis Date    CAD (coronary artery disease)     [facility]     [facility] exacerbation [facility]     Hyperlipidemia     Hypertension     Irritable bowel syndrome (IBS)     New onset of congestive heart failure ([facility] [facility] 2019    Primary osteoarthritis of left hip 2019    Thyroid disease          Past Surgical History:   Procedure Laterality Date    APPENDECTOMY  1972    CARDIAC SURGERY  2004    cabg x1     CARDIAC SURGERY  2004    cabg x1    CARPAL TUNNEL RELEASE      right side    CARPAL TUNNEL RELEASE Right      SECTION  0682,4105     SECTION      x2    CHOLECYSTECTOMY  1970s    CHOLECYSTECTOMY      EYE SURGERY Bilateral 2015    cataracts    FRACTURE SURGERY      HIP ARTHROPLASTY  12/3/2012    RIGHT    JOINT REPLACEMENT Right 2013    hip    TOTAL HIP ARTHROPLASTY Left 2019    TOTAL HIP ARTHROPLASTY Left 2019    LEFT TOTAL HIP REPLACEMENT performed by Aneudy Andrews MD at [address] [place] [address] [place]   Allergen Reactions    Lisinopril Other (See Comments)     cough    Codeine Other (See Comments)     hallucinations    Codeine      hallucinate    Lisinopril      cough         MEDICATIONS   Coumadin Use Last 5 Days [x]No []Yes  Antiplatelet drug therapy use last 5 days  []No [x]Yes  Other anticoagulant use last 5 days  [x]No []Yes      No current facility-administered medications on file prior to encounter. Current Outpatient Medications on File Prior to Encounter   Medication Sig Dispense Refill    rivaroxaban (XARELTO) 10 MG TABS tablet Take 1 tablet by mouth every 24 hours Hold 2 day before cardiac cath 18 tablet 0    losartan (COZAAR) 50 MG tablet Take 0.5 tablets by mouth 2 times daily 30 tablet 3    bumetanide (BUMEX) 1 MG tablet Take 0.5 tablets by mouth daily 30 tablet 3    HYDROcodone-acetaminophen (NORCO) 5-325 MG per tablet Take 1-2 tablets by mouth every 4-6 hours as needed for Pain for up to 7 days.  50 tablet 0   

## 2019-07-08 ENCOUNTER — CARE COORDINATION (OUTPATIENT)
Dept: CASE MANAGEMENT | Age: 69
End: 2019-07-08

## 2019-07-29 ENCOUNTER — CARE COORDINATION (OUTPATIENT)
Dept: CASE MANAGEMENT | Age: 69
End: 2019-07-29

## 2019-08-13 ENCOUNTER — CARE COORDINATION (OUTPATIENT)
Dept: CASE MANAGEMENT | Age: 69
End: 2019-08-13

## 2019-09-17 ENCOUNTER — CARE COORDINATION (OUTPATIENT)
Dept: CASE MANAGEMENT | Age: 69
End: 2019-09-17

## 2021-02-26 ENCOUNTER — IMMUNIZATION (OUTPATIENT)
Dept: PRIMARY CARE CLINIC | Age: 71
End: 2021-02-26
Payer: MEDICARE

## 2021-02-26 PROCEDURE — 91300 COVID-19, PFIZER VACCINE 30MCG/0.3ML DOSE: CPT | Performed by: FAMILY MEDICINE

## 2021-02-26 PROCEDURE — 0001A COVID-19, PFIZER VACCINE 30MCG/0.3ML DOSE: CPT | Performed by: FAMILY MEDICINE

## 2021-03-19 ENCOUNTER — IMMUNIZATION (OUTPATIENT)
Dept: PRIMARY CARE CLINIC | Age: 71
End: 2021-03-19
Payer: MEDICARE

## 2021-03-19 PROCEDURE — 91300 COVID-19, PFIZER VACCINE 30MCG/0.3ML DOSE: CPT

## 2021-03-19 PROCEDURE — 0002A PR IMM ADMN SARSCOV2 30MCG/0.3ML DIL RECON 2ND DOSE: CPT

## 2024-09-14 NOTE — PROGRESS NOTES
Problem: PAIN - ADULT  Goal: Verbalizes/displays adequate comfort level or baseline comfort level  Description: Interventions:  - Encourage patient to monitor pain and request assistance  - Assess pain using appropriate pain scale  - Administer analgesics based on type and severity of pain and evaluate response  - Implement non-pharmacological measures as appropriate and evaluate response  - Consider cultural and social influences on pain and pain management  - Notify physician/advanced practitioner if interventions unsuccessful or patient reports new pain  Outcome: Progressing     Problem: INFECTION - ADULT  Goal: Absence or prevention of progression during hospitalization  Description: INTERVENTIONS:  - Assess and monitor for signs and symptoms of infection  - Monitor lab/diagnostic results  - Monitor all insertion sites, i.e. indwelling lines, tubes, and drains  - Monitor endotracheal if appropriate and nasal secretions for changes in amount and color  - [place] appropriate cooling/warming therapies per order  - Administer medications as ordered  - Instruct and encourage patient and family to use good hand hygiene technique  - Identify and instruct in appropriate isolation precautions for identified infection/condition  Outcome: Progressing     Problem: SAFETY ADULT  Goal: Patient will remain free of falls  Description: INTERVENTIONS:  - Educate patient/family on patient safety including physical limitations  - Instruct patient to call for assistance with activity   - Consult OT/PT to assist with strengthening/mobility   - Keep Call bell within reach  - Keep bed low and locked with side rails adjusted as appropriate  - Keep care items and personal belongings within reach  - Initiate and maintain comfort rounds  - Make Fall Risk Sign visible to staff  - Offer Toileting every 2 Hours, in advance of need  - Initiate/Maintain bed/chair alarm  - Apply yellow socks and bracelet for high fall risk patients  -  Eating pudding. Consider moving patient to room near nurses station  Outcome: Progressing  Goal: Maintain or return to baseline ADL function  Description: INTERVENTIONS:  -  Assess patient's ability to carry out ADLs; assess patient's baseline for ADL function and identify physical deficits which impact ability to perform ADLs (bathing, care of mouth/teeth, toileting, grooming, dressing, etc.)  - Assess/evaluate cause of self-care deficits   - Assess range of motion  - Assess patient's mobility; develop plan if impaired  - Assess patient's need for assistive devices and provide as appropriate  - Encourage maximum independence but intervene and supervise when necessary  - Involve family in performance of ADLs  - Assess for home care needs following discharge   - Consider OT consult to assist with ADL evaluation and planning for discharge  - Provide patient education as appropriate  Outcome: Progressing  Goal: Maintains/Returns to pre admission functional level  Description: INTERVENTIONS:  - Perform AM-PAC 6 Click Basic Mobility/ Daily Activity assessment daily.  - Set and communicate daily mobility goal to care team and patient/family/caregiver.   - Collaborate with rehabilitation services on mobility goals if consulted  - Ambulate patient 3 times a day  - Out of bed to chair 3 times a day   - Out of bed for toileting  - Record patient progress and toleration of activity level   Outcome: Progressing     Problem: DISCHARGE PLANNING  Goal: Discharge to home or other facility with appropriate resources  Description: INTERVENTIONS:  - Identify barriers to discharge w/patient and caregiver  - Arrange for needed discharge resources and transportation as appropriate  - Identify discharge learning needs (meds, wound care, etc.)  - Arrange for interpretive services to assist at discharge as needed  - Refer to Case Management Department for coordinating discharge planning if the patient needs post-hospital services based on  physician/advanced practitioner order or complex needs related to functional status, cognitive ability, or social support system  Outcome: Progressing     Problem: Knowledge Deficit  Goal: Patient/family/caregiver demonstrates understanding of disease process, treatment plan, medications, and discharge instructions  Description: Complete learning assessment and assess knowledge base.  Interventions:  - Provide teaching at level of understanding  - Provide teaching via preferred learning methods  Outcome: Progressing     Problem: Knowledge Deficit  Goal: Patient/family/caregiver demonstrates understanding of disease process, treatment plan, medications, and discharge instructions  Description: Complete learning assessment and assess knowledge base.  Interventions:  - Provide teaching at level of understanding  - Provide teaching via preferred learning methods  Outcome: Progressing     Problem: MUSCULOSKELETAL - ADULT  Goal: Maintain or return mobility to safest level of function  Description: INTERVENTIONS:  - Assess patient's ability to carry out ADLs; assess patient's baseline for ADL function and identify physical deficits which impact ability to perform ADLs (bathing, care of mouth/teeth, toileting, grooming, dressing, etc.)  - Assess/evaluate cause of self-care deficits   - Assess range of motion  - Assess patient's mobility  - Assess patient's need for assistive devices and provide as appropriate  - Encourage maximum independence but intervene and supervise when necessary  - Involve family in performance of ADLs  - Assess for home care needs following discharge   - Consider OT consult to assist with ADL evaluation and planning for discharge  - Provide patient education as appropriate  Outcome: Progressing

## 2024-11-28 ENCOUNTER — APPOINTMENT (OUTPATIENT)
Dept: GENERAL RADIOLOGY | Age: 74
End: 2024-11-28
Payer: MEDICARE

## 2024-11-28 ENCOUNTER — HOSPITAL ENCOUNTER (INPATIENT)
Age: 74
LOS: 8 days | Discharge: NURSING, REHAB OR LONG-TERM CARE | End: 2024-12-06
Attending: EMERGENCY MEDICINE | Admitting: STUDENT IN AN ORGANIZED HEALTH CARE EDUCATION/TRAINING PROGRAM
Payer: MEDICARE

## 2024-11-28 ENCOUNTER — APPOINTMENT (OUTPATIENT)
Dept: CT IMAGING | Age: 74
End: 2024-11-28
Payer: MEDICARE

## 2024-11-28 DIAGNOSIS — R94.39 ABNORMAL STRESS TEST: ICD-10-CM

## 2024-11-28 DIAGNOSIS — I20.9 ANGINA PECTORIS (HCC): ICD-10-CM

## 2024-11-28 DIAGNOSIS — I24.9 ACUTE CORONARY SYNDROME (HCC): ICD-10-CM

## 2024-11-28 DIAGNOSIS — R53.1 GENERAL WEAKNESS: ICD-10-CM

## 2024-11-28 DIAGNOSIS — I21.4 NSTEMI (NON-ST ELEVATED MYOCARDIAL INFARCTION) (HCC): Primary | ICD-10-CM

## 2024-11-28 DIAGNOSIS — S82.839A TRAUMATIC CLOSED DISPLACED FRACTURE OF DISTAL FIBULA: ICD-10-CM

## 2024-11-28 DIAGNOSIS — S82.851A CLOSED TRIMALLEOLAR FRACTURE OF RIGHT ANKLE, INITIAL ENCOUNTER: ICD-10-CM

## 2024-11-28 DIAGNOSIS — N17.9 AKI (ACUTE KIDNEY INJURY) (HCC): ICD-10-CM

## 2024-11-28 LAB
ALBUMIN SERPL BCG-MCNC: 3.2 G/DL (ref 3.5–5.1)
ALP SERPL-CCNC: 56 U/L (ref 38–126)
ALT SERPL W/O P-5'-P-CCNC: 29 U/L (ref 11–66)
ANION GAP SERPL CALC-SCNC: 13 MEQ/L (ref 8–16)
APTT PPP: 24.2 SECONDS (ref 22–38)
AST SERPL-CCNC: 33 U/L (ref 5–40)
BASOPHILS ABSOLUTE: 0 THOU/MM3 (ref 0–0.1)
BASOPHILS NFR BLD AUTO: 0.2 %
BILIRUB CONJ SERPL-MCNC: < 0.1 MG/DL (ref 0.1–13.8)
BILIRUB SERPL-MCNC: 0.2 MG/DL (ref 0.3–1.2)
BILIRUB UR QL STRIP.AUTO: NEGATIVE
BUN SERPL-MCNC: 39 MG/DL (ref 7–22)
CALCIUM SERPL-MCNC: 8.4 MG/DL (ref 8.5–10.5)
CHARACTER UR: CLEAR
CHLORIDE SERPL-SCNC: 99 MEQ/L (ref 98–111)
CO2 SERPL-SCNC: 23 MEQ/L (ref 23–33)
COLOR, UA: YELLOW
CREAT SERPL-MCNC: 1.3 MG/DL (ref 0.4–1.2)
DEPRECATED RDW RBC AUTO: 59.2 FL (ref 35–45)
DEPRECATED RDW RBC AUTO: 59.3 FL (ref 35–45)
EKG ATRIAL RATE: 108 BPM
EKG P AXIS: 86 DEGREES
EKG P-R INTERVAL: 164 MS
EKG Q-T INTERVAL: 354 MS
EKG QRS DURATION: 118 MS
EKG QTC CALCULATION (BAZETT): 474 MS
EKG R AXIS: 70 DEGREES
EKG T AXIS: -112 DEGREES
EKG VENTRICULAR RATE: 108 BPM
EOSINOPHIL NFR BLD AUTO: 0.1 %
EOSINOPHILS ABSOLUTE: 0 THOU/MM3 (ref 0–0.4)
ERYTHROCYTE [DISTWIDTH] IN BLOOD BY AUTOMATED COUNT: 14.9 % (ref 11.5–14.5)
ERYTHROCYTE [DISTWIDTH] IN BLOOD BY AUTOMATED COUNT: 15.1 % (ref 11.5–14.5)
GFR SERPL CREATININE-BSD FRML MDRD: 42 ML/MIN/1.73M2
GLUCOSE SERPL-MCNC: 105 MG/DL (ref 70–108)
GLUCOSE UR QL STRIP.AUTO: NEGATIVE MG/DL
HCT VFR BLD AUTO: 32.2 % (ref 37–47)
HCT VFR BLD AUTO: 35.2 % (ref 37–47)
HEPARIN UNFRACTIONATED: 0.1 U/ML (ref 0.3–0.7)
HEPARIN UNFRACTIONATED: 0.74 U/ML (ref 0.3–0.7)
HGB BLD-MCNC: 10 GM/DL (ref 12–16)
HGB BLD-MCNC: 11.3 GM/DL (ref 12–16)
HGB UR QL STRIP.AUTO: NEGATIVE
IMM GRANULOCYTES # BLD AUTO: 0.32 THOU/MM3 (ref 0–0.07)
IMM GRANULOCYTES NFR BLD AUTO: 1.6 %
INR PPP: 0.9 (ref 0.85–1.13)
KETONES UR QL STRIP.AUTO: NEGATIVE
LACTATE SERPL-SCNC: 2.3 MMOL/L (ref 0.5–2)
LACTATE SERPL-SCNC: 2.4 MMOL/L (ref 0.5–2)
LACTATE SERPL-SCNC: 2.5 MMOL/L (ref 0.5–2)
LYMPHOCYTES ABSOLUTE: 1.6 THOU/MM3 (ref 1–4.8)
LYMPHOCYTES NFR BLD AUTO: 8.1 %
MCH RBC QN AUTO: 33.9 PG (ref 26–33)
MCH RBC QN AUTO: 34.1 PG (ref 26–33)
MCHC RBC AUTO-ENTMCNC: 31.1 GM/DL (ref 32.2–35.5)
MCHC RBC AUTO-ENTMCNC: 32.1 GM/DL (ref 32.2–35.5)
MCV RBC AUTO: 106.3 FL (ref 81–99)
MCV RBC AUTO: 109.2 FL (ref 81–99)
MONOCYTES ABSOLUTE: 0.8 THOU/MM3 (ref 0.4–1.3)
MONOCYTES NFR BLD AUTO: 3.8 %
NEUTROPHILS ABSOLUTE: 17.4 THOU/MM3 (ref 1.8–7.7)
NEUTROPHILS NFR BLD AUTO: 86.2 %
NITRITE UR QL STRIP: NEGATIVE
NRBC BLD AUTO-RTO: 0 /100 WBC
NT-PROBNP SERPL IA-MCNC: 2216 PG/ML (ref 0–449)
OSMOLALITY SERPL CALC.SUM OF ELEC: 279.9 MOSMOL/KG (ref 275–300)
PH UR STRIP.AUTO: 7.5 [PH] (ref 5–9)
PLATELET # BLD AUTO: 275 THOU/MM3 (ref 130–400)
PLATELET # BLD AUTO: 322 THOU/MM3 (ref 130–400)
PMV BLD AUTO: 9.2 FL (ref 9.4–12.4)
PMV BLD AUTO: 9.5 FL (ref 9.4–12.4)
POTASSIUM SERPL-SCNC: 3.6 MEQ/L (ref 3.5–5.2)
PROT SERPL-MCNC: 6 G/DL (ref 6.1–8)
PROT UR STRIP.AUTO-MCNC: NEGATIVE MG/DL
RBC # BLD AUTO: 2.95 MILL/MM3 (ref 4.2–5.4)
RBC # BLD AUTO: 3.31 MILL/MM3 (ref 4.2–5.4)
REASON FOR REJECTION: NORMAL
REJECTED TEST: NORMAL
SODIUM SERPL-SCNC: 135 MEQ/L (ref 135–145)
SP GR UR REFRACT.AUTO: 1.02 (ref 1–1.03)
TROPONIN, HIGH SENSITIVITY: 112 NG/L (ref 0–12)
TROPONIN, HIGH SENSITIVITY: 135 NG/L (ref 0–12)
TROPONIN, HIGH SENSITIVITY: 149 NG/L (ref 0–12)
UROBILINOGEN, URINE: 0.2 EU/DL (ref 0–1)
WBC # BLD AUTO: 15.6 THOU/MM3 (ref 4.8–10.8)
WBC # BLD AUTO: 20.2 THOU/MM3 (ref 4.8–10.8)
WBC #/AREA URNS HPF: NEGATIVE /[HPF]

## 2024-11-28 PROCEDURE — 80053 COMPREHEN METABOLIC PANEL: CPT

## 2024-11-28 PROCEDURE — 81003 URINALYSIS AUTO W/O SCOPE: CPT

## 2024-11-28 PROCEDURE — 72125 CT NECK SPINE W/O DYE: CPT

## 2024-11-28 PROCEDURE — 71275 CT ANGIOGRAPHY CHEST: CPT

## 2024-11-28 PROCEDURE — 6360000004 HC RX CONTRAST MEDICATION: Performed by: EMERGENCY MEDICINE

## 2024-11-28 PROCEDURE — 2580000003 HC RX 258: Performed by: PHYSICIAN ASSISTANT

## 2024-11-28 PROCEDURE — 93005 ELECTROCARDIOGRAM TRACING: CPT | Performed by: EMERGENCY MEDICINE

## 2024-11-28 PROCEDURE — 83880 ASSAY OF NATRIURETIC PEPTIDE: CPT

## 2024-11-28 PROCEDURE — 85025 COMPLETE CBC W/AUTO DIFF WBC: CPT

## 2024-11-28 PROCEDURE — 83605 ASSAY OF LACTIC ACID: CPT

## 2024-11-28 PROCEDURE — 96361 HYDRATE IV INFUSION ADD-ON: CPT

## 2024-11-28 PROCEDURE — 29515 APPLICATION SHORT LEG SPLINT: CPT

## 2024-11-28 PROCEDURE — 71101 X-RAY EXAM UNILAT RIBS/CHEST: CPT

## 2024-11-28 PROCEDURE — 36415 COLL VENOUS BLD VENIPUNCTURE: CPT

## 2024-11-28 PROCEDURE — 2580000003 HC RX 258: Performed by: EMERGENCY MEDICINE

## 2024-11-28 PROCEDURE — 82248 BILIRUBIN DIRECT: CPT

## 2024-11-28 PROCEDURE — 96375 TX/PRO/DX INJ NEW DRUG ADDON: CPT

## 2024-11-28 PROCEDURE — 6370000000 HC RX 637 (ALT 250 FOR IP): Performed by: PHYSICIAN ASSISTANT

## 2024-11-28 PROCEDURE — 6360000002 HC RX W HCPCS: Performed by: EMERGENCY MEDICINE

## 2024-11-28 PROCEDURE — 93010 ELECTROCARDIOGRAM REPORT: CPT | Performed by: INTERNAL MEDICINE

## 2024-11-28 PROCEDURE — 6370000000 HC RX 637 (ALT 250 FOR IP): Performed by: EMERGENCY MEDICINE

## 2024-11-28 PROCEDURE — 85520 HEPARIN ASSAY: CPT

## 2024-11-28 PROCEDURE — 84484 ASSAY OF TROPONIN QUANT: CPT

## 2024-11-28 PROCEDURE — 75635 CT ANGIO ABDOMINAL ARTERIES: CPT

## 2024-11-28 PROCEDURE — 85730 THROMBOPLASTIN TIME PARTIAL: CPT

## 2024-11-28 PROCEDURE — 73630 X-RAY EXAM OF FOOT: CPT

## 2024-11-28 PROCEDURE — 85610 PROTHROMBIN TIME: CPT

## 2024-11-28 PROCEDURE — 73610 X-RAY EXAM OF ANKLE: CPT

## 2024-11-28 PROCEDURE — 99223 1ST HOSP IP/OBS HIGH 75: CPT | Performed by: PHYSICIAN ASSISTANT

## 2024-11-28 PROCEDURE — 1200000003 HC TELEMETRY R&B

## 2024-11-28 PROCEDURE — 85027 COMPLETE CBC AUTOMATED: CPT

## 2024-11-28 PROCEDURE — 99285 EMERGENCY DEPT VISIT HI MDM: CPT

## 2024-11-28 PROCEDURE — 96365 THER/PROPH/DIAG IV INF INIT: CPT

## 2024-11-28 PROCEDURE — 70450 CT HEAD/BRAIN W/O DYE: CPT

## 2024-11-28 RX ORDER — ATORVASTATIN CALCIUM 80 MG/1
80 TABLET, FILM COATED ORAL NIGHTLY
Status: DISCONTINUED | OUTPATIENT
Start: 2024-11-28 | End: 2024-12-06 | Stop reason: HOSPADM

## 2024-11-28 RX ORDER — SODIUM CHLORIDE 0.9 % (FLUSH) 0.9 %
10 SYRINGE (ML) INJECTION PRN
Status: DISCONTINUED | OUTPATIENT
Start: 2024-11-28 | End: 2024-11-30 | Stop reason: SDUPTHER

## 2024-11-28 RX ORDER — LIDOCAINE 4 G/G
1 PATCH TOPICAL ONCE
Status: COMPLETED | OUTPATIENT
Start: 2024-11-28 | End: 2024-11-29

## 2024-11-28 RX ORDER — BUMETANIDE 0.5 MG/1
0.5 TABLET ORAL DAILY
Status: DISCONTINUED | OUTPATIENT
Start: 2024-11-29 | End: 2024-11-29

## 2024-11-28 RX ORDER — TRAZODONE HYDROCHLORIDE 100 MG/1
200 TABLET ORAL NIGHTLY PRN
Status: DISCONTINUED | OUTPATIENT
Start: 2024-11-28 | End: 2024-12-06 | Stop reason: HOSPADM

## 2024-11-28 RX ORDER — POLYETHYLENE GLYCOL 3350 17 G/17G
17 POWDER, FOR SOLUTION ORAL DAILY PRN
Status: DISCONTINUED | OUTPATIENT
Start: 2024-11-28 | End: 2024-11-29

## 2024-11-28 RX ORDER — ASPIRIN 81 MG/1
243 TABLET, CHEWABLE ORAL ONCE
Status: COMPLETED | OUTPATIENT
Start: 2024-11-28 | End: 2024-11-28

## 2024-11-28 RX ORDER — HEPARIN SODIUM 1000 [USP'U]/ML
60 INJECTION, SOLUTION INTRAVENOUS; SUBCUTANEOUS PRN
Status: DISCONTINUED | OUTPATIENT
Start: 2024-11-28 | End: 2024-11-29

## 2024-11-28 RX ORDER — IOPAMIDOL 755 MG/ML
80 INJECTION, SOLUTION INTRAVASCULAR
Status: COMPLETED | OUTPATIENT
Start: 2024-11-28 | End: 2024-11-28

## 2024-11-28 RX ORDER — LOSARTAN POTASSIUM 25 MG/1
25 TABLET ORAL 2 TIMES DAILY
Status: DISCONTINUED | OUTPATIENT
Start: 2024-11-28 | End: 2024-11-29

## 2024-11-28 RX ORDER — 0.9 % SODIUM CHLORIDE 0.9 %
1000 INTRAVENOUS SOLUTION INTRAVENOUS ONCE
Status: COMPLETED | OUTPATIENT
Start: 2024-11-28 | End: 2024-11-28

## 2024-11-28 RX ORDER — ASPIRIN 81 MG/1
81 TABLET ORAL DAILY
Status: DISCONTINUED | OUTPATIENT
Start: 2024-11-29 | End: 2024-12-06 | Stop reason: HOSPADM

## 2024-11-28 RX ORDER — NITROGLYCERIN 0.4 MG/1
0.4 TABLET SUBLINGUAL EVERY 5 MIN PRN
Status: DISCONTINUED | OUTPATIENT
Start: 2024-11-28 | End: 2024-12-06 | Stop reason: HOSPADM

## 2024-11-28 RX ORDER — CYCLOBENZAPRINE HCL 10 MG
10 TABLET ORAL DAILY
Status: DISCONTINUED | OUTPATIENT
Start: 2024-11-29 | End: 2024-11-28

## 2024-11-28 RX ORDER — CARVEDILOL 6.25 MG/1
6.25 TABLET ORAL 2 TIMES DAILY WITH MEALS
Status: DISCONTINUED | OUTPATIENT
Start: 2024-11-29 | End: 2024-11-29

## 2024-11-28 RX ORDER — LEVOTHYROXINE SODIUM 50 UG/1
50 TABLET ORAL DAILY
Status: DISCONTINUED | OUTPATIENT
Start: 2024-11-29 | End: 2024-12-06 | Stop reason: HOSPADM

## 2024-11-28 RX ORDER — ACETAMINOPHEN 650 MG/1
650 SUPPOSITORY RECTAL EVERY 6 HOURS PRN
Status: DISCONTINUED | OUTPATIENT
Start: 2024-11-28 | End: 2024-12-06 | Stop reason: HOSPADM

## 2024-11-28 RX ORDER — VITAMIN B COMPLEX
1000 TABLET ORAL DAILY
Status: DISCONTINUED | OUTPATIENT
Start: 2024-11-29 | End: 2024-12-06 | Stop reason: HOSPADM

## 2024-11-28 RX ORDER — CHLORAL HYDRATE 500 MG
1 CAPSULE ORAL DAILY
Status: DISCONTINUED | OUTPATIENT
Start: 2024-11-29 | End: 2024-12-06 | Stop reason: HOSPADM

## 2024-11-28 RX ORDER — ONDANSETRON 4 MG/1
4 TABLET, ORALLY DISINTEGRATING ORAL EVERY 8 HOURS PRN
Status: DISCONTINUED | OUTPATIENT
Start: 2024-11-28 | End: 2024-12-06 | Stop reason: HOSPADM

## 2024-11-28 RX ORDER — ACETAMINOPHEN 325 MG/1
650 TABLET ORAL ONCE
Status: COMPLETED | OUTPATIENT
Start: 2024-11-28 | End: 2024-11-28

## 2024-11-28 RX ORDER — ACETAMINOPHEN 325 MG/1
650 TABLET ORAL EVERY 6 HOURS PRN
Status: DISCONTINUED | OUTPATIENT
Start: 2024-11-28 | End: 2024-12-06 | Stop reason: HOSPADM

## 2024-11-28 RX ORDER — ROPINIROLE 1 MG/1
2 TABLET, FILM COATED ORAL NIGHTLY
Status: DISCONTINUED | OUTPATIENT
Start: 2024-11-28 | End: 2024-12-06 | Stop reason: HOSPADM

## 2024-11-28 RX ORDER — SODIUM CHLORIDE 0.9 % (FLUSH) 0.9 %
10 SYRINGE (ML) INJECTION EVERY 12 HOURS SCHEDULED
Status: DISCONTINUED | OUTPATIENT
Start: 2024-11-28 | End: 2024-11-30 | Stop reason: SDUPTHER

## 2024-11-28 RX ORDER — METOPROLOL SUCCINATE 25 MG/1
25 TABLET, EXTENDED RELEASE ORAL DAILY
COMMUNITY

## 2024-11-28 RX ORDER — HEPARIN SODIUM 1000 [USP'U]/ML
60 INJECTION, SOLUTION INTRAVENOUS; SUBCUTANEOUS ONCE
Status: COMPLETED | OUTPATIENT
Start: 2024-11-28 | End: 2024-11-28

## 2024-11-28 RX ORDER — HEPARIN SODIUM 10000 [USP'U]/100ML
5-30 INJECTION, SOLUTION INTRAVENOUS CONTINUOUS
Status: DISCONTINUED | OUTPATIENT
Start: 2024-11-28 | End: 2024-11-29

## 2024-11-28 RX ORDER — PANTOPRAZOLE SODIUM 40 MG/1
40 TABLET, DELAYED RELEASE ORAL
Status: DISCONTINUED | OUTPATIENT
Start: 2024-11-29 | End: 2024-12-06 | Stop reason: HOSPADM

## 2024-11-28 RX ORDER — MORPHINE SULFATE 2 MG/ML
2 INJECTION, SOLUTION INTRAMUSCULAR; INTRAVENOUS ONCE
Status: COMPLETED | OUTPATIENT
Start: 2024-11-28 | End: 2024-11-28

## 2024-11-28 RX ORDER — HEPARIN SODIUM 1000 [USP'U]/ML
30 INJECTION, SOLUTION INTRAVENOUS; SUBCUTANEOUS PRN
Status: DISCONTINUED | OUTPATIENT
Start: 2024-11-28 | End: 2024-11-29

## 2024-11-28 RX ORDER — DOCUSATE SODIUM 100 MG/1
100 CAPSULE, LIQUID FILLED ORAL 2 TIMES DAILY PRN
Status: DISCONTINUED | OUTPATIENT
Start: 2024-11-28 | End: 2024-11-30

## 2024-11-28 RX ORDER — PAROXETINE 20 MG/1
20 TABLET, FILM COATED ORAL EVERY MORNING
Status: DISCONTINUED | OUTPATIENT
Start: 2024-11-29 | End: 2024-12-06 | Stop reason: HOSPADM

## 2024-11-28 RX ORDER — FLUTICASONE PROPIONATE 50 MCG
1 SPRAY, SUSPENSION (ML) NASAL DAILY PRN
Status: DISCONTINUED | OUTPATIENT
Start: 2024-11-28 | End: 2024-12-06 | Stop reason: HOSPADM

## 2024-11-28 RX ORDER — CYCLOBENZAPRINE HCL 10 MG
5 TABLET ORAL 2 TIMES DAILY
Status: DISCONTINUED | OUTPATIENT
Start: 2024-11-28 | End: 2024-12-06 | Stop reason: HOSPADM

## 2024-11-28 RX ORDER — SODIUM CHLORIDE 9 MG/ML
INJECTION, SOLUTION INTRAVENOUS PRN
Status: DISCONTINUED | OUTPATIENT
Start: 2024-11-28 | End: 2024-12-06 | Stop reason: HOSPADM

## 2024-11-28 RX ORDER — ONDANSETRON 2 MG/ML
4 INJECTION INTRAMUSCULAR; INTRAVENOUS EVERY 6 HOURS PRN
Status: DISCONTINUED | OUTPATIENT
Start: 2024-11-28 | End: 2024-12-06 | Stop reason: HOSPADM

## 2024-11-28 RX ADMIN — SODIUM CHLORIDE, PRESERVATIVE FREE 10 ML: 5 INJECTION INTRAVENOUS at 23:37

## 2024-11-28 RX ADMIN — MORPHINE SULFATE 2 MG: 2 INJECTION, SOLUTION INTRAMUSCULAR; INTRAVENOUS at 18:52

## 2024-11-28 RX ADMIN — ACETAMINOPHEN 650 MG: 325 TABLET ORAL at 16:10

## 2024-11-28 RX ADMIN — TRAZODONE HYDROCHLORIDE 200 MG: 100 TABLET ORAL at 23:36

## 2024-11-28 RX ADMIN — IOPAMIDOL 80 ML: 755 INJECTION, SOLUTION INTRAVENOUS at 15:48

## 2024-11-28 RX ADMIN — ATORVASTATIN CALCIUM 80 MG: 80 TABLET, FILM COATED ORAL at 23:36

## 2024-11-28 RX ADMIN — SODIUM CHLORIDE 1000 ML: 9 INJECTION, SOLUTION INTRAVENOUS at 16:10

## 2024-11-28 RX ADMIN — CYCLOBENZAPRINE 5 MG: 10 TABLET, FILM COATED ORAL at 23:36

## 2024-11-28 RX ADMIN — ASPIRIN 81 MG 243 MG: 81 TABLET ORAL at 18:52

## 2024-11-28 RX ADMIN — HEPARIN SODIUM 3300 UNITS: 1000 INJECTION INTRAVENOUS; SUBCUTANEOUS at 19:11

## 2024-11-28 RX ADMIN — HEPARIN SODIUM 12 UNITS/KG/HR: 10000 INJECTION, SOLUTION INTRAVENOUS at 19:11

## 2024-11-28 RX ADMIN — ROPINIROLE HYDROCHLORIDE 2 MG: 1 TABLET, FILM COATED ORAL at 23:37

## 2024-11-28 ASSESSMENT — LIFESTYLE VARIABLES
HOW OFTEN DO YOU HAVE A DRINK CONTAINING ALCOHOL: MONTHLY OR LESS
HOW MANY STANDARD DRINKS CONTAINING ALCOHOL DO YOU HAVE ON A TYPICAL DAY: 1 OR 2

## 2024-11-28 ASSESSMENT — PAIN - FUNCTIONAL ASSESSMENT
PAIN_FUNCTIONAL_ASSESSMENT: NONE - DENIES PAIN
PAIN_FUNCTIONAL_ASSESSMENT: NONE - DENIES PAIN
PAIN_FUNCTIONAL_ASSESSMENT: 0-10

## 2024-11-28 ASSESSMENT — PAIN SCALES - GENERAL
PAINLEVEL_OUTOF10: 5
PAINLEVEL_OUTOF10: 3

## 2024-11-28 ASSESSMENT — PAIN DESCRIPTION - LOCATION: LOCATION: RIB CAGE

## 2024-11-28 ASSESSMENT — PAIN DESCRIPTION - ONSET: ONSET: ON-GOING

## 2024-11-28 ASSESSMENT — PAIN DESCRIPTION - FREQUENCY: FREQUENCY: CONTINUOUS

## 2024-11-28 ASSESSMENT — PAIN DESCRIPTION - ORIENTATION: ORIENTATION: LEFT

## 2024-11-28 ASSESSMENT — PAIN DESCRIPTION - PAIN TYPE: TYPE: ACUTE PAIN

## 2024-11-28 NOTE — ED TRIAGE NOTES
Pt presents to the ED with c/o a mechanical fall and is c/o left ribcage pain. Pt states she has been falling easily as of lately. Pt states her falls have been occurring when she is bending over. Pt states she was bent over to plug in her kvng tree and she fell down. Pt is also c/o right foot swelling and bruising. Pt states she noticed it about 5 days ago and has been gradually getting worse.

## 2024-11-28 NOTE — ED NOTES
Pt vitals collected. Pt denies any needs at this time. Pt updated on POC. Pt respirations easy and unlabored. Urine sample sent to lab at this time.

## 2024-11-28 NOTE — ED PROVIDER NOTES
[facility]  EMERGENCY DEPARTMENT ENCOUNTER        PATIENT NAME: Vandana Monson  MRN: [id number]  : 1950  ROBLEDO: 2024  PROVIDER: Tee Ryder MD    Patient was seen and evaluated at 2:53 PM EST. Nurses Notes are reviewed and I agree except as noted in the HPI.  Chief Complaint   Patient presents with    Fall    Rib Injury     left    Leg Swelling     right     HISTORY OF PRESENT ILLNESS     A 74-year-old female presents with fall, left rib pain, right ankle and foot swelling and pain.      Patient says she was bending forward around 11:30 am today at home to plug in her [employer] tree, and she fell forward, landing on the left side of the rib.      She also says over the last week, she has been having no strength to bilateral lower extremities with frequent falls.  She noticed right ankle and foot swelling with ecchymosis on dorsal foot for the last 5 days but she did not seek medical help.      After she fell today, her daughter who arrived later noticed patient's right foot swelling and ecchymosis, therefore patient was taken to ED.      Patient denies head or neck injury during recent falls including today's fall.  No LOC.  She has no fever or chills.  No SOB.  No nausea or vomiting.  No abdominal pain.  Her feet are cold.    This HPI was provided by patient.     PAST MEDICAL HISTORY    has a past medical history of CAD (coronary artery disease), COPD (chronic obstructive pulmonary disease) (HCC), COPD without exacerbation (HCC), Hyperlipidemia, Hypertension, Irritable bowel syndrome (IBS), New onset of congestive heart failure (HCC), Primary osteoarthritis of left hip, and Thyroid disease.    SURGICAL HISTORY      has a past surgical history that includes Cardiac surgery (); Cholecystectomy ();  section (,); Appendectomy (); fracture surgery; Carpal tunnel release; Hip Arthroplasty (12/3/2012); Cardiac surgery (); joint replacement (Right, );   Discussed with and admitted by hospital medicine service.    ED MEDICATIONS:  (None if blank)  Medications   heparin (porcine) injection 3,300 Units (has no administration in time range)   heparin (porcine) injection 3,300 Units (has no administration in time range)   heparin (porcine) injection 1,600 Units (has no administration in time range)   heparin 25,000 units in dextrose 5% 250 mL (premix) infusion (has no administration in time range)   aspirin chewable tablet 243 mg (has no administration in time range)   sodium chloride 0.9 % bolus 1,000 mL (0 mLs IntraVENous Stopped 11/28/24 1740)   acetaminophen (TYLENOL) tablet 650 mg (650 mg Oral Given 11/28/24 1610)   iopamidol (ISOVUE-370) 76 % injection 80 mL (80 mLs IntraVENous Given 11/28/24 1548)       CONSULTANTS:  Hospital medicine    PROCEDURES:   None    CRITICAL CARE:   CRITICAL CARE: There was a high probability of clinically significant/life threatening deterioration in this patient's condition of NSTEMI and right ankle trimalleolar fracture which required my urgent intervention.  I personally spent at least 45 minutes of time attending to this patient's critical care needs separate from teaching or billable procedures. This time includes bedside evaluation and management, review of labs and imaging, review of the chart for written updates and recommendations, documentation, and communication with other services on the case.  This patient requires complex, high-level decision-making to prevent deterioration or morbid sequelae of ongoing disease.      Vitals:    11/28/24 1523 11/28/24 1613 11/28/24 1656 11/28/24 1757   BP: (!) 113/91 125/68 (!) 134/90 117/64   Pulse: (!) 109 98 (!) 102 88   Resp: 14 16 16 11   Temp:       TempSrc:       SpO2: 96% 96% 96% 95%   Weight:       Height:           NUMBER AND COMPLEXITY OF PROBLEMS        Problem List This Visit: fall, weakness, right ankle pain and swelling    Pertinent Comorbid Conditions:  See HPI, PMH and

## 2024-11-29 ENCOUNTER — APPOINTMENT (OUTPATIENT)
Age: 74
End: 2024-11-29
Payer: MEDICARE

## 2024-11-29 ENCOUNTER — HOSPITAL ENCOUNTER (INPATIENT)
Age: 74
Discharge: HOME | End: 2024-12-01
Attending: INTERNAL MEDICINE
Payer: MEDICARE

## 2024-11-29 PROBLEM — F32.A ANXIETY AND DEPRESSION: Status: ACTIVE | Noted: 2024-11-29

## 2024-11-29 PROBLEM — N18.31 STAGE 3A CHRONIC KIDNEY DISEASE (HCC): Status: ACTIVE | Noted: 2024-11-29

## 2024-11-29 PROBLEM — F41.9 ANXIETY AND DEPRESSION: Status: ACTIVE | Noted: 2024-11-29

## 2024-11-29 PROBLEM — I21.4 NSTEMI (NON-ST ELEVATED MYOCARDIAL INFARCTION) (HCC): Status: ACTIVE | Noted: 2024-11-29

## 2024-11-29 PROBLEM — G25.81 RLS (RESTLESS LEGS SYNDROME): Status: ACTIVE | Noted: 2024-11-29

## 2024-11-29 LAB
ANION GAP SERPL CALC-SCNC: 13 MEQ/L (ref 8–16)
BASOPHILS ABSOLUTE: 0 THOU/MM3 (ref 0–0.1)
BASOPHILS NFR BLD AUTO: 0.2 %
BUN SERPL-MCNC: 28 MG/DL (ref 7–22)
CALCIUM SERPL-MCNC: 8.7 MG/DL (ref 8.5–10.5)
CHLORIDE SERPL-SCNC: 106 MEQ/L (ref 98–111)
CO2 SERPL-SCNC: 23 MEQ/L (ref 23–33)
CREAT SERPL-MCNC: 1.2 MG/DL (ref 0.4–1.2)
DEPRECATED RDW RBC AUTO: 58.4 FL (ref 35–45)
ECHO AV CUSP MM: 1.8 CM
ECHO AV PEAK GRADIENT: 5 MMHG
ECHO AV PEAK VELOCITY: 1.1 M/S
ECHO AV VELOCITY RATIO: 0.64
ECHO BSA: 1.53 M2
ECHO LA AREA 2C: 12.4 CM2
ECHO LA AREA 4C: 11 CM2
ECHO LA DIAMETER INDEX: 2.57 CM/M2
ECHO LA DIAMETER: 3.9 CM
ECHO LA MAJOR AXIS: 4.4 CM
ECHO LA MINOR AXIS: 3.7 CM
ECHO LA VOL BP: 29 ML (ref 22–52)
ECHO LA VOL MOD A2C: 33 ML (ref 22–52)
ECHO LA VOL MOD A4C: 22 ML (ref 22–52)
ECHO LA VOL/BSA BIPLANE: 19 ML/M2 (ref 16–34)
ECHO LA VOLUME INDEX MOD A2C: 22 ML/M2 (ref 16–34)
ECHO LA VOLUME INDEX MOD A4C: 14 ML/M2 (ref 16–34)
ECHO LV E' LATERAL VELOCITY: 6.3 CM/S
ECHO LV E' SEPTAL VELOCITY: 6.9 CM/S
ECHO LV EDV A2C: 75 ML
ECHO LV EDV A4C: 62 ML
ECHO LV EDV INDEX A4C: 41 ML/M2
ECHO LV EDV NDEX A2C: 49 ML/M2
ECHO LV EJECTION FRACTION A2C: 37 %
ECHO LV EJECTION FRACTION A4C: 33 %
ECHO LV EJECTION FRACTION BIPLANE: 36 % (ref 55–100)
ECHO LV ESV A2C: 48 ML
ECHO LV ESV A4C: 42 ML
ECHO LV ESV INDEX A2C: 32 ML/M2
ECHO LV ESV INDEX A4C: 28 ML/M2
ECHO LV FRACTIONAL SHORTENING: 32 % (ref 28–44)
ECHO LV INTERNAL DIMENSION DIASTOLE INDEX: 3.09 CM/M2
ECHO LV INTERNAL DIMENSION DIASTOLIC: 4.7 CM (ref 3.9–5.3)
ECHO LV INTERNAL DIMENSION SYSTOLIC INDEX: 2.11 CM/M2
ECHO LV INTERNAL DIMENSION SYSTOLIC: 3.2 CM
ECHO LV IVSD: 1.1 CM (ref 0.6–0.9)
ECHO LV MASS 2D: 164.5 G (ref 67–162)
ECHO LV MASS INDEX 2D: 108.2 G/M2 (ref 43–95)
ECHO LV POSTERIOR WALL DIASTOLIC: 0.9 CM (ref 0.6–0.9)
ECHO LV RELATIVE WALL THICKNESS RATIO: 0.38
ECHO LVOT PEAK GRADIENT: 2 MMHG
ECHO LVOT PEAK VELOCITY: 0.7 M/S
ECHO MV A VELOCITY: 0.95 M/S
ECHO MV E DECELERATION TIME (DT): 222 MS
ECHO MV E VELOCITY: 0.63 M/S
ECHO MV E/A RATIO: 0.66
ECHO MV E/E' LATERAL: 10
ECHO MV E/E' RATIO (AVERAGED): 9.57
ECHO MV E/E' SEPTAL: 9.13
ECHO MV REGURGITANT PEAK GRADIENT: 49 MMHG
ECHO MV REGURGITANT PEAK VELOCITY: 3.5 M/S
ECHO PV MAX VELOCITY: 0.8 M/S
ECHO PV PEAK GRADIENT: 3 MMHG
ECHO RV INTERNAL DIMENSION: 2.6 CM
ECHO TV E WAVE: 0.4 M/S
ECHO TV REGURGITANT MAX VELOCITY: 2.87 M/S
ECHO TV REGURGITANT PEAK GRADIENT: 33 MMHG
EKG ATRIAL RATE: 72 BPM
EKG ATRIAL RATE: 82 BPM
EKG P AXIS: 68 DEGREES
EKG P AXIS: 74 DEGREES
EKG P-R INTERVAL: 154 MS
EKG P-R INTERVAL: 164 MS
EKG Q-T INTERVAL: 432 MS
EKG Q-T INTERVAL: 474 MS
EKG QRS DURATION: 116 MS
EKG QRS DURATION: 118 MS
EKG QTC CALCULATION (BAZETT): 504 MS
EKG QTC CALCULATION (BAZETT): 519 MS
EKG R AXIS: 34 DEGREES
EKG R AXIS: 84 DEGREES
EKG T AXIS: -133 DEGREES
EKG T AXIS: -98 DEGREES
EKG VENTRICULAR RATE: 72 BPM
EKG VENTRICULAR RATE: 82 BPM
EOSINOPHIL NFR BLD AUTO: 0.8 %
EOSINOPHILS ABSOLUTE: 0.1 THOU/MM3 (ref 0–0.4)
ERYTHROCYTE [DISTWIDTH] IN BLOOD BY AUTOMATED COUNT: 15.1 % (ref 11.5–14.5)
FERRITIN SERPL IA-MCNC: 109 NG/ML (ref 10–291)
FOLATE SERPL-MCNC: > 20 NG/ML (ref 4.8–24.2)
GFR SERPL CREATININE-BSD FRML MDRD: 47 ML/MIN/1.73M2
GLUCOSE SERPL-MCNC: 152 MG/DL (ref 70–108)
HCT VFR BLD AUTO: 30.3 % (ref 37–47)
HEPARIN UNFRACTIONATED: 0.59 U/ML (ref 0.3–0.7)
HGB BLD-MCNC: 9.5 GM/DL (ref 12–16)
IMM GRANULOCYTES # BLD AUTO: 0.11 THOU/MM3 (ref 0–0.07)
IMM GRANULOCYTES NFR BLD AUTO: 0.9 %
IRON SATN MFR SERPL: 40 % (ref 20–50)
IRON SERPL-MCNC: 81 UG/DL (ref 50–170)
LACTATE SERPL-SCNC: 1.2 MMOL/L (ref 0.5–2)
LYMPHOCYTES ABSOLUTE: 1.7 THOU/MM3 (ref 1–4.8)
LYMPHOCYTES NFR BLD AUTO: 13.5 %
MCH RBC QN AUTO: 33.5 PG (ref 26–33)
MCHC RBC AUTO-ENTMCNC: 31.4 GM/DL (ref 32.2–35.5)
MCV RBC AUTO: 106.7 FL (ref 81–99)
MONOCYTES ABSOLUTE: 0.7 THOU/MM3 (ref 0.4–1.3)
MONOCYTES NFR BLD AUTO: 5.6 %
NEUTROPHILS ABSOLUTE: 10 THOU/MM3 (ref 1.8–7.7)
NEUTROPHILS NFR BLD AUTO: 79 %
NRBC BLD AUTO-RTO: 0 /100 WBC
NUC STRESS EJECTION FRACTION: 42 %
PLATELET # BLD AUTO: 247 THOU/MM3 (ref 130–400)
PMV BLD AUTO: 9.3 FL (ref 9.4–12.4)
POTASSIUM SERPL-SCNC: 3.9 MEQ/L (ref 3.5–5.2)
RBC # BLD AUTO: 2.84 MILL/MM3 (ref 4.2–5.4)
SODIUM SERPL-SCNC: 142 MEQ/L (ref 135–145)
STRESS BASELINE DIAS BP: 56 MMHG
STRESS BASELINE HR: 73 BPM
STRESS BASELINE SYS BP: 120 MMHG
STRESS ESTIMATED WORKLOAD: 1 METS
STRESS PEAK DIAS BP: 67 MMHG
STRESS PEAK SYS BP: 146 MMHG
STRESS PERCENT HR ACHIEVED: 60 %
STRESS POST PEAK HR: 87 BPM
STRESS RATE PRESSURE PRODUCT: NORMAL BPM*MMHG
STRESS STAGE 1 BP: NORMAL MMHG
STRESS STAGE 1 DURATION: 1 MIN:SEC
STRESS STAGE 1 HR: 78 BPM
STRESS STAGE 2 BP: NORMAL MMHG
STRESS STAGE 2 DURATION: 1 MIN:SEC
STRESS STAGE 2 HR: 81 BPM
STRESS STAGE 3 BP: NORMAL MMHG
STRESS STAGE 3 DURATION: 1 MIN:SEC
STRESS STAGE 3 HR: 86 BPM
STRESS STAGE RECOVERY 1 BP: NORMAL MMHG
STRESS STAGE RECOVERY 1 DURATION: 1 MIN:SEC
STRESS STAGE RECOVERY 1 HR: 80 BPM
STRESS STAGE RECOVERY 2 BP: NORMAL MMHG
STRESS STAGE RECOVERY 2 DURATION: 1 MIN:SEC
STRESS STAGE RECOVERY 2 HR: 80 BPM
STRESS STAGE RECOVERY 3 BP: NORMAL MMHG
STRESS STAGE RECOVERY 3 DURATION: 1 MIN:SEC
STRESS STAGE RECOVERY 3 HR: 82 BPM
STRESS STAGE RECOVERY 4 BP: NORMAL MMHG
STRESS STAGE RECOVERY 4 DURATION: 2 MIN:SEC
STRESS STAGE RECOVERY 4 HR: 77 BPM
STRESS TARGET HR: 144 BPM
TIBC SERPL-MCNC: 203 UG/DL (ref 171–450)
TID: 0.99
VIT B12 SERPL-MCNC: 800 PG/ML (ref 211–911)
WBC # BLD AUTO: 12.6 THOU/MM3 (ref 4.8–10.8)

## 2024-11-29 PROCEDURE — 2580000003 HC RX 258: Performed by: PHYSICIAN ASSISTANT

## 2024-11-29 PROCEDURE — 6370000000 HC RX 637 (ALT 250 FOR IP): Performed by: PHYSICIAN ASSISTANT

## 2024-11-29 PROCEDURE — C1769 GUIDE WIRE: HCPCS | Performed by: INTERNAL MEDICINE

## 2024-11-29 PROCEDURE — 82728 ASSAY OF FERRITIN: CPT

## 2024-11-29 PROCEDURE — 99153 MOD SED SAME PHYS/QHP EA: CPT | Performed by: INTERNAL MEDICINE

## 2024-11-29 PROCEDURE — C1894 INTRO/SHEATH, NON-LASER: HCPCS | Performed by: INTERNAL MEDICINE

## 2024-11-29 PROCEDURE — 1200000000 HC SEMI PRIVATE

## 2024-11-29 PROCEDURE — 83550 IRON BINDING TEST: CPT

## 2024-11-29 PROCEDURE — 3430000000 HC RX DIAGNOSTIC RADIOPHARMACEUTICAL: Performed by: INTERNAL MEDICINE

## 2024-11-29 PROCEDURE — 93010 ELECTROCARDIOGRAM REPORT: CPT | Performed by: INTERNAL MEDICINE

## 2024-11-29 PROCEDURE — 82607 VITAMIN B-12: CPT

## 2024-11-29 PROCEDURE — 93306 TTE W/DOPPLER COMPLETE: CPT

## 2024-11-29 PROCEDURE — 1200000003 HC TELEMETRY R&B

## 2024-11-29 PROCEDURE — 99233 SBSQ HOSP IP/OBS HIGH 50: CPT | Performed by: STUDENT IN AN ORGANIZED HEALTH CARE EDUCATION/TRAINING PROGRAM

## 2024-11-29 PROCEDURE — 2709999900 HC NON-CHARGEABLE SUPPLY: Performed by: INTERNAL MEDICINE

## 2024-11-29 PROCEDURE — 6360000002 HC RX W HCPCS: Performed by: INTERNAL MEDICINE

## 2024-11-29 PROCEDURE — B2151ZZ FLUOROSCOPY OF LEFT HEART USING LOW OSMOLAR CONTRAST: ICD-10-PCS | Performed by: INTERNAL MEDICINE

## 2024-11-29 PROCEDURE — 93005 ELECTROCARDIOGRAM TRACING: CPT | Performed by: PHYSICIAN ASSISTANT

## 2024-11-29 PROCEDURE — 2580000003 HC RX 258: Performed by: INTERNAL MEDICINE

## 2024-11-29 PROCEDURE — 93458 L HRT ARTERY/VENTRICLE ANGIO: CPT | Performed by: INTERNAL MEDICINE

## 2024-11-29 PROCEDURE — 6360000004 HC RX CONTRAST MEDICATION: Performed by: INTERNAL MEDICINE

## 2024-11-29 PROCEDURE — 82746 ASSAY OF FOLIC ACID SERUM: CPT

## 2024-11-29 PROCEDURE — B2111ZZ FLUOROSCOPY OF MULTIPLE CORONARY ARTERIES USING LOW OSMOLAR CONTRAST: ICD-10-PCS | Performed by: INTERNAL MEDICINE

## 2024-11-29 PROCEDURE — 4A023N7 MEASUREMENT OF CARDIAC SAMPLING AND PRESSURE, LEFT HEART, PERCUTANEOUS APPROACH: ICD-10-PCS | Performed by: INTERNAL MEDICINE

## 2024-11-29 PROCEDURE — G0269 OCCLUSIVE DEVICE IN VEIN ART: HCPCS | Performed by: INTERNAL MEDICINE

## 2024-11-29 PROCEDURE — 99152 MOD SED SAME PHYS/QHP 5/>YRS: CPT | Performed by: INTERNAL MEDICINE

## 2024-11-29 PROCEDURE — A9500 TC99M SESTAMIBI: HCPCS | Performed by: INTERNAL MEDICINE

## 2024-11-29 PROCEDURE — 36415 COLL VENOUS BLD VENIPUNCTURE: CPT

## 2024-11-29 PROCEDURE — 85025 COMPLETE CBC W/AUTO DIFF WBC: CPT

## 2024-11-29 PROCEDURE — 6370000000 HC RX 637 (ALT 250 FOR IP)

## 2024-11-29 PROCEDURE — 93306 TTE W/DOPPLER COMPLETE: CPT | Performed by: INTERNAL MEDICINE

## 2024-11-29 PROCEDURE — 6360000002 HC RX W HCPCS: Performed by: PHYSICIAN ASSISTANT

## 2024-11-29 PROCEDURE — 83605 ASSAY OF LACTIC ACID: CPT

## 2024-11-29 PROCEDURE — 99223 1ST HOSP IP/OBS HIGH 75: CPT | Performed by: INTERNAL MEDICINE

## 2024-11-29 PROCEDURE — 80048 BASIC METABOLIC PNL TOTAL CA: CPT

## 2024-11-29 PROCEDURE — C1760 CLOSURE DEV, VASC: HCPCS | Performed by: INTERNAL MEDICINE

## 2024-11-29 PROCEDURE — 83540 ASSAY OF IRON: CPT

## 2024-11-29 PROCEDURE — 93005 ELECTROCARDIOGRAM TRACING: CPT

## 2024-11-29 PROCEDURE — 85520 HEPARIN ASSAY: CPT

## 2024-11-29 RX ORDER — TETRAKIS(2-METHOXYISOBUTYLISOCYANIDE)COPPER(I) TETRAFLUOROBORATE 1 MG/ML
34.1 INJECTION, POWDER, LYOPHILIZED, FOR SOLUTION INTRAVENOUS
Status: COMPLETED | OUTPATIENT
Start: 2024-11-29 | End: 2024-11-29

## 2024-11-29 RX ORDER — SODIUM CHLORIDE 9 MG/ML
INJECTION, SOLUTION INTRAVENOUS PRN
Status: DISCONTINUED | OUTPATIENT
Start: 2024-11-29 | End: 2024-11-30 | Stop reason: SDUPTHER

## 2024-11-29 RX ORDER — SPIRONOLACTONE 25 MG/1
12.5 TABLET ORAL DAILY
Status: DISCONTINUED | OUTPATIENT
Start: 2024-11-29 | End: 2024-12-02

## 2024-11-29 RX ORDER — FENTANYL CITRATE 50 UG/ML
INJECTION, SOLUTION INTRAMUSCULAR; INTRAVENOUS PRN
Status: DISCONTINUED | OUTPATIENT
Start: 2024-11-29 | End: 2024-11-29 | Stop reason: HOSPADM

## 2024-11-29 RX ORDER — SODIUM CHLORIDE 0.9 % (FLUSH) 0.9 %
5-40 SYRINGE (ML) INJECTION EVERY 12 HOURS SCHEDULED
Status: DISCONTINUED | OUTPATIENT
Start: 2024-11-29 | End: 2024-11-30 | Stop reason: SDUPTHER

## 2024-11-29 RX ORDER — POLYETHYLENE GLYCOL 3350 17 G/17G
17 POWDER, FOR SOLUTION ORAL DAILY
Status: DISCONTINUED | OUTPATIENT
Start: 2024-11-29 | End: 2024-11-30

## 2024-11-29 RX ORDER — REGADENOSON 0.08 MG/ML
0.4 INJECTION, SOLUTION INTRAVENOUS
Status: COMPLETED | OUTPATIENT
Start: 2024-11-29 | End: 2024-11-29

## 2024-11-29 RX ORDER — IOPAMIDOL 755 MG/ML
INJECTION, SOLUTION INTRAVASCULAR PRN
Status: DISCONTINUED | OUTPATIENT
Start: 2024-11-29 | End: 2024-11-29 | Stop reason: HOSPADM

## 2024-11-29 RX ORDER — MIDAZOLAM HYDROCHLORIDE 1 MG/ML
INJECTION, SOLUTION INTRAMUSCULAR; INTRAVENOUS PRN
Status: DISCONTINUED | OUTPATIENT
Start: 2024-11-29 | End: 2024-11-29 | Stop reason: HOSPADM

## 2024-11-29 RX ORDER — LIDOCAINE 4 G/G
1 PATCH TOPICAL DAILY
Status: DISCONTINUED | OUTPATIENT
Start: 2024-11-29 | End: 2024-12-06 | Stop reason: HOSPADM

## 2024-11-29 RX ORDER — TETRAKIS(2-METHOXYISOBUTYLISOCYANIDE)COPPER(I) TETRAFLUOROBORATE 1 MG/ML
10.7 INJECTION, POWDER, LYOPHILIZED, FOR SOLUTION INTRAVENOUS
Status: COMPLETED | OUTPATIENT
Start: 2024-11-29 | End: 2024-11-29

## 2024-11-29 RX ORDER — ATROPINE SULFATE 0.4 MG/ML
0.5 INJECTION, SOLUTION INTRAVENOUS
Status: ACTIVE | OUTPATIENT
Start: 2024-11-29 | End: 2024-11-30

## 2024-11-29 RX ORDER — METOPROLOL SUCCINATE 25 MG/1
25 TABLET, EXTENDED RELEASE ORAL DAILY
Status: DISCONTINUED | OUTPATIENT
Start: 2024-11-29 | End: 2024-12-06 | Stop reason: HOSPADM

## 2024-11-29 RX ORDER — SPIRONOLACTONE 25 MG/1
12.5 TABLET ORAL DAILY
COMMUNITY

## 2024-11-29 RX ORDER — ACETAMINOPHEN 325 MG/1
650 TABLET ORAL EVERY 4 HOURS PRN
Status: DISCONTINUED | OUTPATIENT
Start: 2024-11-29 | End: 2024-11-30 | Stop reason: SDUPTHER

## 2024-11-29 RX ORDER — SODIUM CHLORIDE 9 MG/ML
INJECTION, SOLUTION INTRAVENOUS CONTINUOUS
Status: DISCONTINUED | OUTPATIENT
Start: 2024-11-29 | End: 2024-11-29

## 2024-11-29 RX ORDER — FUROSEMIDE 40 MG/1
40 TABLET ORAL DAILY
Status: DISCONTINUED | OUTPATIENT
Start: 2024-11-29 | End: 2024-12-03

## 2024-11-29 RX ORDER — SODIUM CHLORIDE 9 MG/ML
INJECTION, SOLUTION INTRAVENOUS CONTINUOUS
Status: DISCONTINUED | OUTPATIENT
Start: 2024-11-30 | End: 2024-11-30 | Stop reason: SDUPTHER

## 2024-11-29 RX ORDER — SODIUM CHLORIDE 0.9 % (FLUSH) 0.9 %
5-40 SYRINGE (ML) INJECTION EVERY 12 HOURS SCHEDULED
Status: DISCONTINUED | OUTPATIENT
Start: 2024-11-29 | End: 2024-12-06 | Stop reason: HOSPADM

## 2024-11-29 RX ORDER — SODIUM CHLORIDE 0.9 % (FLUSH) 0.9 %
5-40 SYRINGE (ML) INJECTION PRN
Status: DISCONTINUED | OUTPATIENT
Start: 2024-11-29 | End: 2024-12-06 | Stop reason: HOSPADM

## 2024-11-29 RX ORDER — FUROSEMIDE 40 MG/1
40 TABLET ORAL DAILY
Status: ON HOLD | COMMUNITY
End: 2024-12-06 | Stop reason: HOSPADM

## 2024-11-29 RX ORDER — SODIUM CHLORIDE 9 MG/ML
INJECTION, SOLUTION INTRAVENOUS CONTINUOUS
Status: DISCONTINUED | OUTPATIENT
Start: 2024-11-29 | End: 2024-12-01

## 2024-11-29 RX ORDER — SODIUM CHLORIDE 0.9 % (FLUSH) 0.9 %
5-40 SYRINGE (ML) INJECTION PRN
Status: DISCONTINUED | OUTPATIENT
Start: 2024-11-29 | End: 2024-11-30 | Stop reason: SDUPTHER

## 2024-11-29 RX ADMIN — PANTOPRAZOLE SODIUM 40 MG: 40 TABLET, DELAYED RELEASE ORAL at 16:35

## 2024-11-29 RX ADMIN — ACETAMINOPHEN 650 MG: 325 TABLET ORAL at 06:21

## 2024-11-29 RX ADMIN — HYDROMORPHONE HYDROCHLORIDE 0.5 MG: 1 INJECTION, SOLUTION INTRAMUSCULAR; INTRAVENOUS; SUBCUTANEOUS at 10:29

## 2024-11-29 RX ADMIN — CYCLOBENZAPRINE 5 MG: 10 TABLET, FILM COATED ORAL at 09:01

## 2024-11-29 RX ADMIN — SODIUM CHLORIDE, PRESERVATIVE FREE 10 ML: 5 INJECTION INTRAVENOUS at 23:40

## 2024-11-29 RX ADMIN — ASPIRIN 81 MG: 81 TABLET, COATED ORAL at 09:01

## 2024-11-29 RX ADMIN — TRAZODONE HYDROCHLORIDE 200 MG: 100 TABLET ORAL at 23:56

## 2024-11-29 RX ADMIN — ATORVASTATIN CALCIUM 80 MG: 80 TABLET, FILM COATED ORAL at 23:56

## 2024-11-29 RX ADMIN — METOPROLOL SUCCINATE 25 MG: 25 TABLET, FILM COATED, EXTENDED RELEASE ORAL at 13:48

## 2024-11-29 RX ADMIN — Medication 1000 UNITS: at 09:01

## 2024-11-29 RX ADMIN — HYDROMORPHONE HYDROCHLORIDE 0.5 MG: 1 INJECTION, SOLUTION INTRAMUSCULAR; INTRAVENOUS; SUBCUTANEOUS at 17:23

## 2024-11-29 RX ADMIN — SODIUM CHLORIDE: 9 INJECTION, SOLUTION INTRAVENOUS at 23:41

## 2024-11-29 RX ADMIN — LEVOTHYROXINE SODIUM 50 MCG: 0.05 TABLET ORAL at 06:22

## 2024-11-29 RX ADMIN — ROPINIROLE HYDROCHLORIDE 2 MG: 1 TABLET, FILM COATED ORAL at 23:57

## 2024-11-29 RX ADMIN — Medication 6 MG: at 23:56

## 2024-11-29 RX ADMIN — CYCLOBENZAPRINE 5 MG: 10 TABLET, FILM COATED ORAL at 23:56

## 2024-11-29 RX ADMIN — Medication 10.7 MILLICURIE: at 10:55

## 2024-11-29 RX ADMIN — PANTOPRAZOLE SODIUM 40 MG: 40 TABLET, DELAYED RELEASE ORAL at 06:21

## 2024-11-29 RX ADMIN — Medication 34.1 MILLICURIE: at 12:07

## 2024-11-29 RX ADMIN — REGADENOSON 0.4 MG: 0.08 INJECTION, SOLUTION INTRAVENOUS at 12:06

## 2024-11-29 RX ADMIN — PAROXETINE HYDROCHLORIDE 20 MG: 20 TABLET, FILM COATED ORAL at 09:01

## 2024-11-29 RX ADMIN — SODIUM CHLORIDE, PRESERVATIVE FREE 10 ML: 5 INJECTION INTRAVENOUS at 09:01

## 2024-11-29 RX ADMIN — SODIUM CHLORIDE, PRESERVATIVE FREE 10 ML: 5 INJECTION INTRAVENOUS at 23:56

## 2024-11-29 ASSESSMENT — PAIN SCALES - GENERAL
PAINLEVEL_OUTOF10: 0
PAINLEVEL_OUTOF10: 9
PAINLEVEL_OUTOF10: 7
PAINLEVEL_OUTOF10: 6
PAINLEVEL_OUTOF10: 0
PAINLEVEL_OUTOF10: 6
PAINLEVEL_OUTOF10: 0
PAINLEVEL_OUTOF10: 0
PAINLEVEL_OUTOF10: 2
PAINLEVEL_OUTOF10: 0
PAINLEVEL_OUTOF10: 0
PAINLEVEL_OUTOF10: 2

## 2024-11-29 ASSESSMENT — PAIN DESCRIPTION - DESCRIPTORS
DESCRIPTORS: ACHING;BURNING
DESCRIPTORS: ACHING
DESCRIPTORS: ACHING;SORE
DESCRIPTORS: DISCOMFORT
DESCRIPTORS: ACHING;SHARP;PRESSURE

## 2024-11-29 ASSESSMENT — PAIN DESCRIPTION - ORIENTATION
ORIENTATION: RIGHT
ORIENTATION: RIGHT
ORIENTATION: LEFT
ORIENTATION: RIGHT

## 2024-11-29 ASSESSMENT — PAIN DESCRIPTION - LOCATION
LOCATION: ANKLE
LOCATION: RIB CAGE
LOCATION: ANKLE
LOCATION: LEG
LOCATION: ANKLE
LOCATION: ANKLE

## 2024-11-29 ASSESSMENT — PAIN - FUNCTIONAL ASSESSMENT: PAIN_FUNCTIONAL_ASSESSMENT: ACTIVITIES ARE NOT PREVENTED

## 2024-11-29 ASSESSMENT — PAIN DESCRIPTION - FREQUENCY: FREQUENCY: CONTINUOUS

## 2024-11-29 ASSESSMENT — PAIN DESCRIPTION - PAIN TYPE: TYPE: ACUTE PAIN

## 2024-11-29 ASSESSMENT — PAIN DESCRIPTION - ONSET: ONSET: ON-GOING

## 2024-11-29 NOTE — ED NOTES
Pt resting on cot w/ eyes open upon entrance. Respirations even and unlabored. Pt denies pain at this time. Family present at bedside.

## 2024-11-29 NOTE — ED NOTES
ED to inpatient nurses report      Chief Complaint:  Chief Complaint   Patient presents with    Fall    Rib Injury     left    Leg Swelling     right     Present to ED from: [facility]    MOA:     LOC: alert and orientated to name, place, date  Mobility: Requires assistance * 1  Oxygen Baseline:     Current needs required: RA     Code Status:   Prior    What abnormal results were found and what did you give/do to treat them? See chart   Any procedures or intervention occur? See chart       Mental Status:  Level of Consciousness: Alert (0)    Psych Assessment:        Vitals:  Patient Vitals for the past 24 hrs:   BP Temp Temp src Pulse Resp SpO2 Height Weight   11/28/24 1856 120/69 -- -- 97 19 98 % -- --   11/28/24 1845 -- -- -- 85 14 98 % -- --   11/28/24 1842 97/70 -- -- 86 15 97 % -- --   11/28/24 1757 117/64 -- -- 88 11 95 % -- --   11/28/24 1656 (!) 134/90 -- -- (!) 102 16 96 % -- --   11/28/24 1613 125/68 -- -- 98 16 96 % -- --   11/28/24 1523 (!) 113/91 -- -- (!) 109 14 96 % -- --   11/28/24 1440 (!) 154/83 98.5 °F (36.9 °C) Oral (!) 116 19 100 % 1.549 m (5' 1\") 54.4 kg (120 lb)        LDAs:   Peripheral IV 11/28/24 Left;Dorsal Wrist (Active)   Site Assessment Clean, dry & intact 11/28/24 1453   Line Status Blood return noted;Flushed 11/28/24 1453   Phlebitis Assessment No symptoms 11/28/24 1453   Infiltration Assessment 0 11/28/24 1453   Dressing Status New dressing applied;Clean, dry & intact 11/28/24 1453   Dressing Type Transparent 11/28/24 1453   Dressing Intervention New 11/28/24 1453       Peripheral IV 11/28/24 Right Antecubital (Active)       Ambulatory Status:  No data recorded    Diagnosis:  DISPOSITION Decision To Admit 11/28/2024 06:09:28 PM   Final diagnoses:   NSTEMI (non-ST elevated myocardial infarction) (HCC)   General weakness   Closed fracture of distal end of right fibula, unspecified fracture morphology, initial encounter   ANI (acute kidney injury) ([facility])        Consults:  None     Pain  Score:  Pain Assessment  Pain Assessment: 0-10  Pain Level: 5  Patient's Stated Pain Goal: 2  Pain Location: Rib cage  Pain Orientation: Left  Pain Type: Acute pain  Pain Frequency: Continuous  Pain Onset: On-going    C-SSRS:   Risk of Suicide: No Risk    Sepsis Screening:       Dany Fall Risk:       Swallow Screening        Preferred Language:   English      ALLERGIES     Lisinopril, Codeine, Codeine, and Lisinopril    SURGICAL HISTORY       Past Surgical History:   Procedure Laterality Date    APPENDECTOMY      CARDIAC SURGERY      cabg x1     CARDIAC SURGERY  2004    cabg x1    CARPAL TUNNEL RELEASE      right side    CARPAL TUNNEL RELEASE Right      SECTION  ,     SECTION      x2    CHOLECYSTECTOMY  1970s    CHOLECYSTECTOMY      EYE SURGERY Bilateral 2015    cataracts    FRACTURE SURGERY      HIP ARTHROPLASTY  12/3/2012    RIGHT    JOINT REPLACEMENT Right 2013    hip    TOTAL HIP ARTHROPLASTY Left 2019    TOTAL HIP ARTHROPLASTY Left 2019    LEFT TOTAL HIP REPLACEMENT performed by Amador Vasquez MD at [facility] OR       PAST MEDICAL HISTORY       Past Medical History:   Diagnosis Date    CAD (coronary artery disease)     COPD (chronic obstructive pulmonary disease) (HCC)     COPD without exacerbation (HCC)     Hyperlipidemia     Hypertension     Irritable bowel syndrome (IBS)     New onset of congestive heart failure (HCC) 2019    Primary osteoarthritis of left hip 2019    Thyroid disease            Electronically signed by Veronica Zayas RN on 2024 at 7:01 PM

## 2024-11-29 NOTE — H&P
Hospitalist History & Physical         Patient: Vandana Monson 74 y.o. female      : 1950  Date of Admission: 2024  Date of Service: Pt seen/examined on 24 and Admitted to Inpatient with expected LOS greater than two midnights due to medical therapy.         ASSESSMENT AND PLAN    Right fracture of distal tibia and fibula  Ortho consult for management   Pain control - start with dilaudid pain panel and switch to PO as soon as able   Monitor neurovascular integrity   Adding pt/ot for injury and recent weakness     Left chest pain -Possible ?NSTEMI vs pericardial damage  Chest pain seems more related to acute trauma   Trending troponins due to elevations   Echo ordered to eval for heart function   Telemetry monitoring   Cardiology consult  EKGs indicating some ischemia with some new t wave inversions in lateral leads       CKD stage IIIa  Creatinine 1.3 on admission just slightly above baseline  Will continue all meds - expect improvement by am  Continue to monitor with labs     CAD  Aspirin, statin, fish oil, coreg   Prn nitro     Hypothyroidism   Synthroid     Primary hypertension  Resume home meds     RLS  Requip     Anxiety/depression  Paxil     Data reviewed (unless otherwise discussed in assessment/plan)  EKG:  I have reviewed the EKG with the following interpretation: see above    Labs: Reviewed, see chart and plan above.       =======================================================================    SUBJECTIVE    Chief Complaint:  trauma 2/2 mechanical fall     History Of Present Illness:  Vandana Monson is a 74 y.o. female  who presents to [facility] with right ankle fracture and left chest pain. She acquired the left chest pain following a fall she sustained after bending over to try and plug in her [employer] tree this morning. She endorses frequent falls over past week and weakness in her lower extremities and states her ankle has been painful and swollen for the past few  days. Patient's daughter decided to bring patient in upon noticing the swelling and bruising of her right ankle.   Patient denies fevers, chills, N/V, LOC with falls         Review of Systems:   Pertinent positives and negatives as noted in the HPI. Otherwise complete ROS negative.    OBJECTIVE  Physical Exam:  /69   Pulse 97   Temp 98.5 °F (36.9 °C) (Oral)   Resp 19   Ht 1.549 m (5' 1\")   Wt 54.4 kg (120 lb)   SpO2 98%   BMI 22.67 kg/m²   General appearance: No apparent distress, appears stated age.  Eyes:  Pupils equal, round, and reactive to light. Conjunctivae/corneas clear.  HENT: Head normal in appearance. External nares normal.  Oral mucosa moist without lesions.  Hearing grossly intact.   Neck: Supple, with full range of motion. Trachea midline.  No gross JVD appreciated.  Respiratory:  Normal respiratory effort. Clear to auscultation, bilaterally without rales or wheezes or rhonchi.  Cardiovascular: Normal rate, regular rhythm with normal S1/S2 without murmurs.  No lower extremity edema.   Abdomen: Soft, non-tender, non-distended with normal bowel sounds.  Musculoskeletal:   Left lateral chest wall tenderness w/o crepitus or ecchymosis.     Right ankle is swollen, right dorsal foot has swelling and ecchymosis.   Appears neurovascularly intact     Skin: Warm and dry. No rashes or lesions.  Neurologic:  No focal sensory/motor deficits in the upper and lower extremities. Cranial nerves:  grossly non-focal 2-12.     Psychiatric: Alert and oriented, normal insight and thought content.   Capillary Refill: Brisk,< 3 seconds.  Peripheral Pulses: +2 palpable, equal bilaterally.       Medications Prior to Admission:   Prior to Admission Medications   Prescriptions Last Dose Informant Patient Reported? Taking?   Melatonin 10 MG TABS   Yes No   Sig: Take 10 mg by mouth nightly as needed   Multiple Vitamins-Minerals (MULTIVITAMIN PO)   Yes No   Sig: Take 1 tablet by mouth daily   Omega-3 Fatty Acids (FISH

## 2024-11-29 NOTE — CARE COORDINATION
Case Management Assessment Initial Evaluation    Date/Time of Evaluation: 11/29/2024 2:33 PM  Assessment Completed by: Edwige Xavier RN    If patient is discharged prior to next notation, then this note serves as note for discharge by case management.    Patient Name: Vandana Monson                   YOB: 1950  Diagnosis: General weakness [R53.1]  NSTEMI (non-ST elevated myocardial infarction) ([facility]) [I21.4]  ANI (acute kidney injury) ([facility]) [N17.9]  Traumatic closed displaced fracture of distal fibula [S82.839A]  Closed trimalleolar fracture of right ankle, initial encounter [S82.851A]                   Date / Time: 11/28/2024  2:34 PM  Location: [address] [place] [address]     Patient Admission Status: Inpatient   Readmission Risk Low 0-14, Mod 15-19), High > 20: Readmission Risk Score: 16.2    Current PCP: Aurelio Worrell MD  Health Care Decision Makers:   Primary Decision Maker: Hilda Rodriguez - Child - [phone]    Additional Case Management Notes: to ED fell today at home to plug in her [employer] tree, and she fell forward, landing on the left side of the rib.      PMH:  CAD, COPD, Hyperlipidemia, Hypertension, Irritable bowel syndrome (IBS), New onset of congestive heart failure , Primary osteoarthritis of left hip, and Thyroid disease.     Orthostatic vitals, Heparin gtt,   Procedures:   11/29 Planning left heart cath today  11/29 NM stress test:  study is positive for myocardial ischemia and infarction. Findings suggest a high risk of cardiac events. There is evidence of inducible ischemia.There is a severe left ventricular stress perfusion defect that is large in size present in the anterior segment(s) that is partially reversible. The defect is consistent with abnormal perfusion in the LAD territory. The defect appears to be infarction and tucker-infarct ischemia.    Imaging:   Right foot / ankle xray:  1. Mildly displaced oblique fracture of the distal fibula.   2. Marked soft tissue edema about the lateral

## 2024-11-29 NOTE — PROCEDURES
PROCEDURE NOTE  Date: 11/29/2024   Name: Vandana Monson  YOB: 1950    Procedures    12 lead EKG completed. Results handed to RN

## 2024-11-29 NOTE — CONSULTS
reasonably achievable. CONTRAST: 125 cc of Isovue-370  intravenously FINDINGS: Motion  artifacts are present that degrade  the images and limit evaluation. VASCULAR FINDINGS: The ascending thoracic aorta is unremarkable. Aortocoronary calcifications. No aneurysm or dissection. NONVASCULAR FINDINGS: No focal pulmonary consolidation. No large pulmonary mass. Central airway is patent. Emphysematous changes. No pleural effusions.  No significant pericardial effusion. The heart is not enlarged. The pulmonary artery is not dilated. No significantly enlarged mediastinal or  axillary lymph node. The thyroid is not enlarged. No chest wall mass. The abdomen is described on a CT abdomen/pelvis. Bones: The bones are demineralized. Median sternotomy has been performed. Degenerative changes of the thoracic spine. .     1. No thoracic aortic aneurysm or dissection. **This report has been created using voice recognition software.  It may contain minor errors which are inherent in voice recognition technology.** Electronically signed by Dr. Henna Aleman    CTA ABDOMINAL AORTA W BILAT RUNOFF W WO CONTRAST    Result Date: 11/28/2024  PROCEDURE: CTA ABDOMINAL AORTA W BILAT RUNOFF W WO CONTRAST CLINICAL INFORMATION: Bilateral foot coldness COMPARISON: No prior study. TECHNIQUE: A noncontrast localizer was obtained. 3 mm axial images were obtained through the abdomen, pelvis and both lower extremities after the administration of intravenous contrast.  3D reconstructions were performed on a dedicated 3-D workstation to include sagittal and coronal mid images through the vasculature. Centerline reconstructions were also obtained. All CT scans at this facility use dose modulation, iterative reconstruction, and/or weight based dosing when appropriate to reduce the radiation dose to as low as reasonably achievable. CONTRAST: 125 cc of Isovue-370  intravenously FINDINGS: VASCULAR FINDINGS: ABDOMINAL AORTA: Atherosclerotic calcifications. No

## 2024-11-29 NOTE — PROGRESS NOTES
Hospitalist Progress Note  Internal Medicine Resident      Patient: Vandana Monson 74 y.o. female      Unit/Bed: K-14/014-A    Admit Date: 11/28/2024      ASSESSMENT AND PLAN  Active Problems  NSTEMI: Patient has significant history of nonobstructive CAD s/p CABG x 1.  Underwent cardiac cath on 7/2/2019 that showed nonobstructive CAD.  Patient denied any cardiac symptoms/chest pain on arrival.  Patient does have left-sided chest pain on palpation.  Troponin trend: 112-135-149.   Cardiology consulted: Chest pain is chronic reproducible on examination but likely MSK.  Angina is less likely.  Mild troponin elevation seems to be due to type II MI in setting of renal failure, anemia, CHF.  May stop heparin drip.  Given risk factors and history of CAD with chest pain and elevated troponin would benefit from ischemic workup.  Stress test to be performed.  Stress test 11/29/2024 showed positive for myocardial ischemia and infarction.  Evidence of inducible ischemia.  Plan for possible cardiac cath later this evening 11/29 otherwise we will try to be performed 11/30  Continue heparin drip  Monitor telemetry  If onset of new chest pain will repeat EKG  Continue ASA 81 mg daily  Continue Lipitor 80 mg nightly  Continue Toprol 25 mg daily  Continue IV fluids with normal saline at 75 mL/h in preparation for cardiac cath  Right distal tibial/fibula fractures: Secondary to increased instability due to physical deconditioning.  Patient reports multiple falls over the past few weeks.  Patient states every time she leans forward she will fall forward.  Onset of right foot/ankle swelling 5 days prior to admission that she did not seek medical attention for.  Right foot x-ray showed mildly displaced oblique fracture of distal fibula with marked soft tissue edema about the lateral aspect of ankle.  CTA abdominal with runoff on 11/28 showed two-vessel posterior tibial artery and peroneal artery runoff seen in the left lower  tenderness. Normal tone. No abnormal movements.  Left-sided chest tenderness on palpation.  Skin: Warm and dry.  Right foot/ankle was wrapped.   Neurologic:  No focal sensory/motor deficits in the upper or lower extremities. Cranial nerves:  grossly non-focal 2-12.     Psychiatric: Alert and oriented, normal insight and thought content.   Capillary Refill: Brisk,< 3 seconds.  Peripheral Pulses: +2 palpable, equal bilaterally.       Labs/Radiology: See chart or assessment above.     Electronically signed by Raji Rhodes MD on 11/29/2024 at 7:34 AM  Case was discussed with Attending, Dr. Danielson.     This report has been created using voice recognition software. It may contain minor errors which are inherent in voice recognition technology

## 2024-11-30 LAB
ANION GAP SERPL CALC-SCNC: 11 MEQ/L (ref 8–16)
BUN SERPL-MCNC: 21 MG/DL (ref 7–22)
CALCIUM SERPL-MCNC: 8.5 MG/DL (ref 8.5–10.5)
CHLORIDE SERPL-SCNC: 107 MEQ/L (ref 98–111)
CO2 SERPL-SCNC: 22 MEQ/L (ref 23–33)
CREAT SERPL-MCNC: 1.1 MG/DL (ref 0.4–1.2)
DEPRECATED RDW RBC AUTO: 60.9 FL (ref 35–45)
ERYTHROCYTE [DISTWIDTH] IN BLOOD BY AUTOMATED COUNT: 15.2 % (ref 11.5–14.5)
GFR SERPL CREATININE-BSD FRML MDRD: 52 ML/MIN/1.73M2
GLUCOSE SERPL-MCNC: 121 MG/DL (ref 70–108)
HCT VFR BLD AUTO: 34 % (ref 37–47)
HGB BLD-MCNC: 10.6 GM/DL (ref 12–16)
LACTATE SERPL-SCNC: 1.2 MMOL/L (ref 0.5–2)
LACTATE SERPL-SCNC: 1.5 MMOL/L (ref 0.5–2)
MCH RBC QN AUTO: 34.4 PG (ref 26–33)
MCHC RBC AUTO-ENTMCNC: 31.2 GM/DL (ref 32.2–35.5)
MCV RBC AUTO: 110.4 FL (ref 81–99)
PATHOLOGIST REVIEW: ABNORMAL
PLATELET # BLD AUTO: 239 THOU/MM3 (ref 130–400)
PMV BLD AUTO: 9.3 FL (ref 9.4–12.4)
POTASSIUM SERPL-SCNC: 4.3 MEQ/L (ref 3.5–5.2)
RBC # BLD AUTO: 3.08 MILL/MM3 (ref 4.2–5.4)
SCAN OF BLOOD SMEAR: NORMAL
SODIUM SERPL-SCNC: 140 MEQ/L (ref 135–145)
WBC # BLD AUTO: 7.2 THOU/MM3 (ref 4.8–10.8)

## 2024-11-30 PROCEDURE — 36415 COLL VENOUS BLD VENIPUNCTURE: CPT

## 2024-11-30 PROCEDURE — 6370000000 HC RX 637 (ALT 250 FOR IP): Performed by: PHYSICIAN ASSISTANT

## 2024-11-30 PROCEDURE — 1200000003 HC TELEMETRY R&B

## 2024-11-30 PROCEDURE — 6360000002 HC RX W HCPCS: Performed by: PHYSICIAN ASSISTANT

## 2024-11-30 PROCEDURE — 6360000002 HC RX W HCPCS

## 2024-11-30 PROCEDURE — 85027 COMPLETE CBC AUTOMATED: CPT

## 2024-11-30 PROCEDURE — 2580000003 HC RX 258: Performed by: INTERNAL MEDICINE

## 2024-11-30 PROCEDURE — 1200000000 HC SEMI PRIVATE

## 2024-11-30 PROCEDURE — 83605 ASSAY OF LACTIC ACID: CPT

## 2024-11-30 PROCEDURE — 6370000000 HC RX 637 (ALT 250 FOR IP)

## 2024-11-30 PROCEDURE — 99232 SBSQ HOSP IP/OBS MODERATE 35: CPT | Performed by: STUDENT IN AN ORGANIZED HEALTH CARE EDUCATION/TRAINING PROGRAM

## 2024-11-30 PROCEDURE — 99232 SBSQ HOSP IP/OBS MODERATE 35: CPT | Performed by: PHYSICIAN ASSISTANT

## 2024-11-30 PROCEDURE — 80048 BASIC METABOLIC PNL TOTAL CA: CPT

## 2024-11-30 RX ORDER — POLYETHYLENE GLYCOL 3350 17 G/17G
17 POWDER, FOR SOLUTION ORAL DAILY PRN
Status: DISCONTINUED | OUTPATIENT
Start: 2024-11-30 | End: 2024-12-06 | Stop reason: HOSPADM

## 2024-11-30 RX ORDER — LOPERAMIDE HYDROCHLORIDE 2 MG/1
2 CAPSULE ORAL 4 TIMES DAILY PRN
Status: DISCONTINUED | OUTPATIENT
Start: 2024-11-30 | End: 2024-12-06 | Stop reason: HOSPADM

## 2024-11-30 RX ORDER — SENNOSIDES A AND B 8.6 MG/1
2 TABLET, FILM COATED ORAL NIGHTLY
Status: DISCONTINUED | OUTPATIENT
Start: 2024-11-30 | End: 2024-12-06 | Stop reason: HOSPADM

## 2024-11-30 RX ORDER — ENOXAPARIN SODIUM 100 MG/ML
40 INJECTION SUBCUTANEOUS EVERY 24 HOURS
Status: DISCONTINUED | OUTPATIENT
Start: 2024-11-30 | End: 2024-12-06 | Stop reason: HOSPADM

## 2024-11-30 RX ADMIN — Medication 1000 MG: at 09:16

## 2024-11-30 RX ADMIN — PAROXETINE HYDROCHLORIDE 20 MG: 20 TABLET, FILM COATED ORAL at 09:17

## 2024-11-30 RX ADMIN — Medication 6 MG: at 23:44

## 2024-11-30 RX ADMIN — PANTOPRAZOLE SODIUM 40 MG: 40 TABLET, DELAYED RELEASE ORAL at 06:26

## 2024-11-30 RX ADMIN — SODIUM CHLORIDE, PRESERVATIVE FREE 10 ML: 5 INJECTION INTRAVENOUS at 00:08

## 2024-11-30 RX ADMIN — LOPERAMIDE HYDROCHLORIDE 2 MG: 2 CAPSULE ORAL at 21:37

## 2024-11-30 RX ADMIN — ASPIRIN 81 MG: 81 TABLET, COATED ORAL at 09:16

## 2024-11-30 RX ADMIN — CYCLOBENZAPRINE 5 MG: 10 TABLET, FILM COATED ORAL at 09:16

## 2024-11-30 RX ADMIN — ONDANSETRON 4 MG: 2 INJECTION INTRAMUSCULAR; INTRAVENOUS at 01:07

## 2024-11-30 RX ADMIN — METOPROLOL SUCCINATE 25 MG: 25 TABLET, FILM COATED, EXTENDED RELEASE ORAL at 09:18

## 2024-11-30 RX ADMIN — LOPERAMIDE HYDROCHLORIDE 2 MG: 2 CAPSULE ORAL at 14:26

## 2024-11-30 RX ADMIN — LEVOTHYROXINE SODIUM 50 MCG: 0.05 TABLET ORAL at 06:25

## 2024-11-30 RX ADMIN — HYDROMORPHONE HYDROCHLORIDE 0.5 MG: 1 INJECTION, SOLUTION INTRAMUSCULAR; INTRAVENOUS; SUBCUTANEOUS at 12:53

## 2024-11-30 RX ADMIN — PANTOPRAZOLE SODIUM 40 MG: 40 TABLET, DELAYED RELEASE ORAL at 16:05

## 2024-11-30 RX ADMIN — HYDROMORPHONE HYDROCHLORIDE 0.5 MG: 1 INJECTION, SOLUTION INTRAMUSCULAR; INTRAVENOUS; SUBCUTANEOUS at 21:37

## 2024-11-30 RX ADMIN — SODIUM CHLORIDE, PRESERVATIVE FREE 10 ML: 5 INJECTION INTRAVENOUS at 21:41

## 2024-11-30 RX ADMIN — SODIUM CHLORIDE, PRESERVATIVE FREE 10 ML: 5 INJECTION INTRAVENOUS at 09:27

## 2024-11-30 RX ADMIN — ATORVASTATIN CALCIUM 80 MG: 80 TABLET, FILM COATED ORAL at 21:41

## 2024-11-30 RX ADMIN — ROPINIROLE HYDROCHLORIDE 2 MG: 1 TABLET, FILM COATED ORAL at 21:41

## 2024-11-30 RX ADMIN — ENOXAPARIN SODIUM 40 MG: 100 INJECTION SUBCUTANEOUS at 14:28

## 2024-11-30 RX ADMIN — Medication 1000 UNITS: at 09:18

## 2024-11-30 RX ADMIN — CYCLOBENZAPRINE 5 MG: 10 TABLET, FILM COATED ORAL at 23:42

## 2024-11-30 RX ADMIN — FUROSEMIDE 40 MG: 40 TABLET ORAL at 09:18

## 2024-11-30 RX ADMIN — SPIRONOLACTONE 12.5 MG: 25 TABLET ORAL at 09:17

## 2024-11-30 RX ADMIN — POLYETHYLENE GLYCOL 3350 17 G: 17 POWDER, FOR SOLUTION ORAL at 09:16

## 2024-11-30 ASSESSMENT — PAIN DESCRIPTION - FREQUENCY: FREQUENCY: CONTINUOUS

## 2024-11-30 ASSESSMENT — PAIN SCALES - GENERAL
PAINLEVEL_OUTOF10: 6
PAINLEVEL_OUTOF10: 7
PAINLEVEL_OUTOF10: 9
PAINLEVEL_OUTOF10: 0

## 2024-11-30 ASSESSMENT — PAIN DESCRIPTION - LOCATION
LOCATION: FOOT
LOCATION: ABDOMEN;FOOT
LOCATION: ABDOMEN;FOOT

## 2024-11-30 ASSESSMENT — PAIN DESCRIPTION - ORIENTATION
ORIENTATION: LEFT
ORIENTATION: LEFT
ORIENTATION: RIGHT

## 2024-11-30 ASSESSMENT — PAIN DESCRIPTION - DESCRIPTORS
DESCRIPTORS: ACHING;DISCOMFORT
DESCRIPTORS: ACHING

## 2024-11-30 ASSESSMENT — PAIN DESCRIPTION - ONSET: ONSET: ON-GOING

## 2024-11-30 ASSESSMENT — PAIN DESCRIPTION - PAIN TYPE: TYPE: ACUTE PAIN

## 2024-11-30 ASSESSMENT — PAIN - FUNCTIONAL ASSESSMENT: PAIN_FUNCTIONAL_ASSESSMENT: ACTIVITIES ARE NOT PREVENTED

## 2024-11-30 NOTE — PROGRESS NOTES
Cardiology Progress Note      Patient:  Vandana Monson  YOB: 1950  MRN: [id number]   Acct: [id number]  Admit Date:  11/28/2024  Primary Cardiologist:  none ?[place]    Note per dr payan \"REASON FOR CONSULT:   Concern for NSTEMI          CHIEF COMPLAINT:    Fall  Leg/foot pain      HISTORY OF PRESENT ILLNESS:    Vandana Monson is a pleasant 74 year old female patient with past medical history that includes:   Past Medical History        Past Medical History:   Diagnosis Date    CAD (coronary artery disease)      COPD (chronic obstructive pulmonary disease) (HCC)      COPD without exacerbation (HCC)      Hyperlipidemia      Hypertension      Irritable bowel syndrome (IBS)      New onset of congestive heart failure (HCC) 6/25/2019    Primary osteoarthritis of left hip 6/20/2019    Thyroid disease        She reports prior h/o CAD, CABG *1 in 2004 :with collapsed bypass graft\" per patient. A LHC in 2019 revealed only nonobstructive coronary artery disease.  An echocardiogram in 2019 revealed an EF of 30-35%. The patient states that she is followed by a cardiologist by the name of \"Smith\" in [place], will need to obtain records. The patient was admitted to the hospital on 11/28/2024. She reports a recent mechanical fall. Presented with foot pain.. patient was found to have right ankle fracture. She was started on Heparin gtt after her troponin was found to be elevated. She has chronic \"soreness\" in her chest wall that she had for years, tender to tocuh, unchanged per patient. Otherwise, the patient denies chest pain, shortness of breath, dyspnea on exertion, orthopnea, paroxysmal nocturnal dyspnea, palpitations, dizziness, syncope, recent weight gain or leg swelling. EKG revealed sinus rhythm, septal infarct, TW inversion with borderline ST depressions in inferolateral leads. Hs Troponin  112, 135, 149. Creatinine on admission was 1.3, improved to 1.2 today. She is anemic, hemoglobin today is 9.5.  07:42 AM     11/30/2024 06:12 AM    CO2 22 11/30/2024 06:12 AM    BUN 21 11/30/2024 06:12 AM    CREATININE 1.1 11/30/2024 06:12 AM    LABGLOM 52 11/30/2024 06:12 AM    LABGLOM 40 07/02/2019 07:24 AM    GLUCOSE 121 11/30/2024 06:12 AM    CALCIUM 8.5 11/30/2024 06:12 AM       Hepatic Function Panel:    Lab Results   Component Value Date/Time    ALKPHOS 56 11/28/2024 04:32 PM    ALT 29 11/28/2024 04:32 PM    AST 33 11/28/2024 04:32 PM    BILITOT 0.2 11/28/2024 04:32 PM    BILIDIR <0.1 11/28/2024 04:32 PM       Magnesium:    Lab Results   Component Value Date/Time    MG 2.2 06/29/2019 03:59 AM       PT/INR:    Lab Results   Component Value Date/Time    INR 0.90 11/28/2024 03:00 PM       HgBA1c:  No results found for: \"LABA1C\"    FLP:    Lab Results   Component Value Date/Time    TRIG 217 06/26/2019 03:41 AM    HDL 54 06/26/2019 03:41 AM       TSH:    Lab Results   Component Value Date/Time    TSH 0.304 06/28/2019 04:38 AM         Assessment:    Atypical chest pain - noncardiac - improving  Abn EKG  Elevated troponins - not NSTEMI  Abn stress test  S/p cath 11/29/24 - nonobstructive CAD, takotsubo per dr morillo  CAD - hx single vesselCABG  Chronic HFrEF  Hx chronic CMP - ef 30-35 per TTE 2019, ef 35-40 per TTE 11/29/24  CKD  Hx COPD  Hx anxiety  Recent fall  Tibia/fibula fracture - ortho following    Plan:    Cont asa/statin/BB  No ace/arb  Will follow prn  Follow primary cardiologist upon dc 2 weeks       Electronically signed by Carmen Pham PA-C on 11/30/2024 at 11:10 AM

## 2024-11-30 NOTE — DISCHARGE INSTRUCTIONS
Normal Observation: You may or may not experience these.    Soreness or tenderness that may last a few weeks.    Possible bruising that could last a few weeks and up to one month.   Formation of a small lump (dime to quarter size) that should last only a few weeks.    Care of your incision  You may shower 24 hours after the procedure. Wet the dressing thoroughly and gently remove the bandage from the hospital during showering. It is easier to remove this way.             Gently clean your site daily using soap and water while standing in the shower. Dry thoroughly.   Do not apply powders or lotions to the site for 2 weeks.   Keep the site clean and dry to prevent infection.    Do not sit in a bathtub or a pool of water for 7 days.    Inspect the site daily.    Activity   You may resume normal activity in 2 days, including driving, letting pain be your     guide.   Limit lifting over 5 pounds (half gallon of milk) to one week or until site heals.  Limit vigorous activity (contact sports) to two weeks time.  You will be able to return to work in 1-3 days.    Call our office immediately if you experience any of the following…  Significant bleeding does not stop after 10 minutes of applying firm pressure directly over incision.   Increased swelling of groin or leg.   Unusual pain at groin or down that leg.   Signs of infection: redness, warmth to touch, drainage, poorly healing incision, fever, or chills.    Follow up cardiologist maximiliano 2 weeks

## 2024-11-30 NOTE — PLAN OF CARE
Problem: Chronic Conditions and Co-morbidities  Goal: Patient's chronic conditions and co-morbidity symptoms are monitored and maintained or improved  11/30/2024 1117 by Dread Lewis RN  Outcome: Progressing  11/30/2024 0416 by Rosa Maria Bob RN  Outcome: Progressing  Flowsheets (Taken 11/29/2024 2130)  Care Plan - Patient's Chronic Conditions and Co-Morbidity Symptoms are Monitored and Maintained or Improved:   Monitor and assess patient's chronic conditions and comorbid symptoms for stability, deterioration, or improvement   Collaborate with multidisciplinary team to address chronic and comorbid conditions and prevent exacerbation or deterioration   Update acute care plan with appropriate goals if chronic or comorbid symptoms are exacerbated and prevent overall improvement and discharge     Problem: Discharge Planning  Goal: Discharge to home or other facility with appropriate resources  11/30/2024 1117 by Dread Lewis RN  Outcome: Progressing  11/30/2024 0416 by Rosa Maria Bob RN  Outcome: Progressing  Flowsheets (Taken 11/29/2024 2130)  Discharge to home or other facility with appropriate resources:   Identify barriers to discharge with patient and caregiver   Arrange for needed discharge resources and transportation as appropriate   Identify discharge learning needs (meds, wound care, etc)   Refer to discharge planning if patient needs post-hospital services based on physician order or complex needs related to functional status, cognitive ability or social support system     Problem: Skin/Tissue Integrity  Goal: Absence of new skin breakdown  Description: 1.  Monitor for areas of redness and/or skin breakdown  2.  Assess vascular access sites hourly  3.  Every 4-6 hours minimum:  Change oxygen saturation probe site  4.  Every 4-6 hours:  If on nasal continuous positive airway pressure, respiratory therapy assess nares and determine need for appliance change or resting period.  11/30/2024 1117 by Debbie

## 2024-11-30 NOTE — CONSULTS
workstation to include sagittal and coronal mid images through the vasculature. Centerline reconstructions were also obtained. All CT scans at this facility use dose modulation, iterative reconstruction, and/or weight based dosing when appropriate to reduce the radiation dose to as low as reasonably achievable. CONTRAST: 125 cc of Isovue-370  intravenously FINDINGS: VASCULAR FINDINGS: ABDOMINAL AORTA: Atherosclerotic calcifications. No aneurysm no dissection. CELIAC TRUNK: Patent and intact. MESENTERIC ARTERIES: Patent and intact. RENAL ARTERIES: Single renal arteries are seen bilaterally and are patent. ILIAC ARTERIES: Patent and intact Femoral-popliteal arteries: Patent Tibioperoneal arteries: 1. Two-vessel posterior tibial and peroneal artery runoff is seen in the left lower extremity. 2. Three-vessel tibioperoneal runoff is seen on the right. NONVASCULAR FINDINGS: The chest is described on the concurrent CT of the chest. The gallbladder is surgically absent. Large amount of stool is seen in the colon. Colonic diverticulosis. The uterus is unremarkable. The liver, biliary tree, pancreas, adrenal glands, spleen, and kidneys are unremarkable. No bowel obstruction or acute inflammatory bowel process. No significantly enlarged lymph nodes are seen. The bladder is grossly unremarkable. Bones: Right distal fibular fracture is seen. Fracture of the posterior malleolus of the right tibia is seen. Fracture along the anterior lateral aspect of the right tibia plafond is seen. Soft tissue edema is seen about the right leg. Bilateral hip arthroplasty.      1. No evidence of an abdominal aortic aneurysm. 2. Two-vessel posterior tibial artery and peroneal artery runoff is seen in the left lower extremity. 3. Right distal fibular fracture is seen. Fracture of the posterior malleolus of the right tibia is seen. Fracture along the anterior lateral aspect of the right tibia plafond is seen. **This report has been created using  Electronically signed by Dr. Henna Aleman    XR ANKLE RIGHT (MIN 3 VIEWS)    Result Date: 11/28/2024  PROCEDURE: XR FOOT RIGHT (MIN 3 VIEWS), XR ANKLE RIGHT (MIN 3 VIEWS) CLINICAL INFORMATION: fall, foot pain and swelling COMPARISON: No TECHNIQUE: 1. 3 views of the right ankle. 2. 4 views of the right foot FINDINGS: A mildly displaced oblique fracture of the distal fibula is seen. Mild degenerative changes of the right foot. The bones are mildly demineralized. Old fracture deformity is seen in the second, third and fourth metatarsals. Vascular calcifications are seen. Marked soft tissue edema about the lateral aspect of the ankle.     1. Mildly displaced oblique fracture of the distal fibula. 2. Marked soft tissue edema about the lateral aspect of ankle. **This report has been created using voice recognition software.  It may contain minor errors which are inherent in voice recognition technology.** Electronically signed by Dr. Henna Aleman    XR FOOT RIGHT (MIN 3 VIEWS)    Result Date: 11/28/2024  PROCEDURE: XR FOOT RIGHT (MIN 3 VIEWS), XR ANKLE RIGHT (MIN 3 VIEWS) CLINICAL INFORMATION: fall, foot pain and swelling COMPARISON: No TECHNIQUE: 1. 3 views of the right ankle. 2. 4 views of the right foot FINDINGS: A mildly displaced oblique fracture of the distal fibula is seen. Mild degenerative changes of the right foot. The bones are mildly demineralized. Old fracture deformity is seen in the second, third and fourth metatarsals. Vascular calcifications are seen. Marked soft tissue edema about the lateral aspect of the ankle.     1. Mildly displaced oblique fracture of the distal fibula. 2. Marked soft tissue edema about the lateral aspect of ankle. **This report has been created using voice recognition software.  It may contain minor errors which are inherent in voice recognition technology.** Electronically signed by Dr. Henna Aleman       ASSESSMENT:Principal Problem:    Traumatic closed displaced

## 2024-11-30 NOTE — H&P
nightly   Yes Lorenzo Mejia MD   vitamin D (CHOLECALCIFEROL) 1000 units TABS tablet Take 1 tablet by mouth daily   Yes Lorenzo Mejia MD   Probiotic Product (PROBIOTIC DAILY PO) Take 1 tablet by mouth daily    Lorenzo Mejia MD   polyethylene glycol (GLYCOLAX) packet Take 17 g by mouth daily as needed for Constipation    Lorenzo Mejia MD   rOPINIRole (REQUIP) 1 MG tablet 2 tablets at bedtime and 1 tablet during day as needed    Lorenzo Mejia MD   calcium carbonate 600 MG TABS tablet Take 1 tablet by mouth daily     Lorenzo Mejia MD   fluticasone (FLONASE) 50 MCG/ACT nasal spray 1 spray by Nasal route daily as needed    Lorenzo Mejia MD     Additional information:       VITAL SIGNS   Vitals:    11/29/24 1753   BP:    Pulse:    Resp: 15   Temp:    SpO2:        PHYSICAL:   General: No acute distress  HEENT:  Unremarkable for age  Neck: without increased JVD, carotid pulses 2+ bilaterally without bruits  Heart: RRR, S1 & S2 WNL, S4 gallop, without murmurs or rubs   NYHA: 2  Lungs: Clear to auscultation    Abdomen: BS present, without HSM, masses, or tenderness    Extremities: without C,C,E.  Pulses 2+ bilaterally  Mental Status: Alert & Oriented        PLANNED PROCEDURE   [x]Cath  [x]PCI                []Pacemaker/AICD  []JOHN             []Cardioversion []Peripheral angiography/PTA  []Other:      SEDATION  Planned agent:[x]Midazolam []Meperidine [x]Sublimaze []Morphine  []Diazepam  []Other:     ASA Classification:  []1 []2 [x]3 []4 []5  Class 1: A normal healthy patient  Class 2: Pt with mild to moderate systemic disease  Class 3: Severe systemic disease or disturbance  Class 4: Severe systemic disorders that are already life threatening.  Class 5: Moribund pt with little chances of survival, for more than 24 hours.  Mallampati I Airway Classification:   []1 []2 [x]3 []4    [x]Pre-procedure diagnostic studies complete and results available.   Comment:    [x]Previous  sedation/anesthesia experiences assessed.   Comment:    [x]The patient is an appropriate candidate to undergo the planned procedure sedation and anesthesia. (Refer to nursing sedation/analgesia documentation record)  [x]Formulation and discussion of sedation/procedure plan, risks, and expectations with patient and/or responsible adult completed.  [x]Patient examined immediately prior to the procedure. (Refer to nursing sedation/analgesia documentation record)    Electronically signed by Lisandro Juarez MD on 11/29/24 at 7:31 PM EST

## 2024-11-30 NOTE — PROGRESS NOTES
dysfunction and severe global hypokinesis of left ventricle seen.  Systolic function was severely reduced.  Home medication: Toprol, Aldactone, Lasix  TTE 11/29/2024 showed EF of 35 to 40% with abnormal diastolic function.  Akinesis of apex.  Mild MR.  Mild TR.  Continue Toprol 25 mg daily  Continue Aldactone 12.5 mg daily  Continue Lasix 40 mg daily  Stopped Cozaar, Coreg, Bumex as patient no longer takes his medications at home  Nonobstructive CAD s/p CABG x1: Continue ASA 81 mg daily, Lipitor 80 mg nightly  Chronic microcytic anemia: Baseline hemoglobin 9.  Hemoglobin on arrival 11.3.  11/29/2024 ferritin 109, iron 81, TIBC 203, iron sat 40, folate greater than 20, vitamin B12 800.  Monitor CBC  Hypothyroidism: Continue Synthroid 50 mcg daily  Hypertension: Continue Toprol 25 mg daily, Aldactone 12.5 mg daily  RLS: Continue Requip 2 mg nightly  Anxiety/depression: Continue Paxil 20 mg daily    LDA: []CVC / []PICC / []Midline / []Nelson / []Drains / []Mediport / [x]None  Antibiotics: No  Steroids: No  Labs (still needed?): [x]Yes / []No  IVF (still needed?): []Yes / [x]No    Level of care: []Step Down / [x]Med-Surg  Bed Status: [x]Inpatient / []Observation  Telemetry: [x]Yes / []No  PT/OT: [x]Yes / []No    DVT Prophylaxis: [x] Lovenox / [] Heparin / [] SCDs / [] Already on Systemic Anticoagulation / [] None     Expected discharge date: TBD  Disposition: TBD     Code status: Full Code     ===================================================================    Chief Complaint:   -Fall  Subjective (past 24 hours):   Seen and evaluated bedside, no acute overnight events.  Underwent left heart cath 11/29/2024 which showed Takotsubo cardiomyopathy.  Remains chest pain-free, no shortness of breath difficulty breathing pain in jaw or arms.  No abdominal pain nausea or vomiting.  Nausea or vomiting.  Reported did have some multiple bowel movements this a.m.  Patient has to be nonweightbearing at this time.  Discussed with  her importance of recommended PT/OT and evaluating for where she would be safe to go home.    HPI / Hospital Course:  Vandana Monson is a 74-year-old female with past medical history significant for nonobstructive CAD s/p CABG x 1, HFrEF, COPD, HLD, HTN who presented to [facility] on 11/28 after a fall.  Patient was attempting to plug in her [place] tree where she fell forward and landed on the left side of her ribs.  Patient ports increasing bilateral lower extremity weakness with increased frequency of falls.  Roughly 5 days prior to admission patient had a fall with onset of right ankle and foot swelling with ecchymosis.  Patient also medical attention at that time.  Following this most recent fall, family saw the swelling and encourage patient to come to ED for further evaluation.  X-ray of right foot showed mildly displaced oblique fracture of the distal fibula with mild soft tissue edema around lateral aspect of ankle.  Other trauma scans on arrival with CT head and CT C-spine were negative for acute process.  Patient was also noted to have elevated troponin and was started on heparin drip per ACS protocol for concern for NSTEMI.     Medications:    Infusion Medications    sodium chloride      sodium chloride 75 mL/hr at 11/30/24 0008    sodium chloride 75 mL/hr at 11/29/24 2341    sodium chloride      sodium chloride      Scheduled Medications    lidocaine  1 patch TransDERmal Daily    furosemide  40 mg Oral Daily    metoprolol succinate  25 mg Oral Daily    spironolactone  12.5 mg Oral Daily    polyethylene glycol  17 g Oral Daily    sodium chloride flush  5-40 mL IntraVENous 2 times per day    sodium chloride flush  5-40 mL IntraVENous 2 times per day    sodium chloride flush  10 mL IntraVENous 2 times per day    aspirin  81 mg Oral Daily    atorvastatin  80 mg Oral Nightly    cyclobenzaprine  5 mg Oral BID    levothyroxine  50 mcg Oral Daily    fish oil  1 capsule Oral Daily    pantoprazole  40 mg Oral BID AC

## 2024-11-30 NOTE — BRIEF OP NOTE
[facility]  Sedation/Analgesia Post Sedation Record    Pt Name: Vandana Monson  Account number: [id number]  MRN: [id number]  YOB: 1950  Procedure Performed By: Lisandro Juarez MD MD FACC, [facility], RPVI  Primary Care Physician: Aurelio Worrell MD  Date: 11/29/2024    POST-PROCEDURE    Physicians/Assistants: Lisandro Juarez MD, LYNDON, [facility] [place], RPVI    Procedure Performed: Cor Angio    Sedation/Anesthesia: Versed/ Fentanyl and 2% xylocaine local anesthesia.      Estimated Blood Loss: < 50 ml.     Specimens Removed: None         Disposition of Specimen: N/A        Complications: No Immediate Complications.       Post-procedure Diagnosis/Findings:       Takotsubo      Electronically signed by Lisandro Juarez MD on 11/29/24 at 9:17 PM EST   Interventional Cardiology

## 2024-11-30 NOTE — PLAN OF CARE
Problem: Chronic Conditions and Co-morbidities  Goal: Patient's chronic conditions and co-morbidity symptoms are monitored and maintained or improved  Outcome: Progressing  Flowsheets (Taken 11/29/2024 2130)  Care Plan - Patient's Chronic Conditions and Co-Morbidity Symptoms are Monitored and Maintained or Improved:   Monitor and assess patient's chronic conditions and comorbid symptoms for stability, deterioration, or improvement   Collaborate with multidisciplinary team to address chronic and comorbid conditions and prevent exacerbation or deterioration   Update acute care plan with appropriate goals if chronic or comorbid symptoms are exacerbated and prevent overall improvement and discharge     Problem: Discharge Planning  Goal: Discharge to home or other facility with appropriate resources  Outcome: Progressing  Flowsheets (Taken 11/29/2024 2130)  Discharge to home or other facility with appropriate resources:   Identify barriers to discharge with patient and caregiver   Arrange for needed discharge resources and transportation as appropriate   Identify discharge learning needs (meds, wound care, etc)   Refer to discharge planning if patient needs post-hospital services based on physician order or complex needs related to functional status, cognitive ability or social support system     Problem: Skin/Tissue Integrity  Goal: Absence of new skin breakdown  Description: 1.  Monitor for areas of redness and/or skin breakdown  2.  Assess vascular access sites hourly  3.  Every 4-6 hours minimum:  Change oxygen saturation probe site  4.  Every 4-6 hours:  If on nasal continuous positive airway pressure, respiratory therapy assess nares and determine need for appliance change or resting period.  Outcome: Progressing  Note: Care plan reviewed with patient and family at bedside.  Patient and family verbalized understanding of the plan of care and contributed to goal setting.      Problem: Safety - Adult  Goal: Free

## 2024-12-01 LAB
ANION GAP SERPL CALC-SCNC: 9 MEQ/L (ref 8–16)
BUN SERPL-MCNC: 16 MG/DL (ref 7–22)
CALCIUM SERPL-MCNC: 8.7 MG/DL (ref 8.5–10.5)
CHLORIDE SERPL-SCNC: 108 MEQ/L (ref 98–111)
CO2 SERPL-SCNC: 26 MEQ/L (ref 23–33)
CREAT SERPL-MCNC: 1 MG/DL (ref 0.4–1.2)
DEPRECATED RDW RBC AUTO: 59.8 FL (ref 35–45)
ERYTHROCYTE [DISTWIDTH] IN BLOOD BY AUTOMATED COUNT: 15.1 % (ref 11.5–14.5)
GFR SERPL CREATININE-BSD FRML MDRD: 58 ML/MIN/1.73M2
GLUCOSE SERPL-MCNC: 119 MG/DL (ref 70–108)
HCT VFR BLD AUTO: 30.5 % (ref 37–47)
HGB BLD-MCNC: 9.5 GM/DL (ref 12–16)
MAGNESIUM SERPL-MCNC: 2 MG/DL (ref 1.6–2.4)
MCH RBC QN AUTO: 33.7 PG (ref 26–33)
MCHC RBC AUTO-ENTMCNC: 31.1 GM/DL (ref 32.2–35.5)
MCV RBC AUTO: 108.2 FL (ref 81–99)
PLATELET # BLD AUTO: 230 THOU/MM3 (ref 130–400)
PMV BLD AUTO: 9.2 FL (ref 9.4–12.4)
POTASSIUM SERPL-SCNC: 4.9 MEQ/L (ref 3.5–5.2)
RBC # BLD AUTO: 2.82 MILL/MM3 (ref 4.2–5.4)
SODIUM SERPL-SCNC: 143 MEQ/L (ref 135–145)
WBC # BLD AUTO: 11.6 THOU/MM3 (ref 4.8–10.8)

## 2024-12-01 PROCEDURE — 97110 THERAPEUTIC EXERCISES: CPT

## 2024-12-01 PROCEDURE — 6360000002 HC RX W HCPCS: Performed by: PHYSICIAN ASSISTANT

## 2024-12-01 PROCEDURE — 6360000002 HC RX W HCPCS

## 2024-12-01 PROCEDURE — 85027 COMPLETE CBC AUTOMATED: CPT

## 2024-12-01 PROCEDURE — 83735 ASSAY OF MAGNESIUM: CPT

## 2024-12-01 PROCEDURE — 1200000000 HC SEMI PRIVATE

## 2024-12-01 PROCEDURE — 2580000003 HC RX 258: Performed by: INTERNAL MEDICINE

## 2024-12-01 PROCEDURE — 6370000000 HC RX 637 (ALT 250 FOR IP)

## 2024-12-01 PROCEDURE — 97166 OT EVAL MOD COMPLEX 45 MIN: CPT

## 2024-12-01 PROCEDURE — 6370000000 HC RX 637 (ALT 250 FOR IP): Performed by: PHYSICIAN ASSISTANT

## 2024-12-01 PROCEDURE — 36415 COLL VENOUS BLD VENIPUNCTURE: CPT

## 2024-12-01 PROCEDURE — 80048 BASIC METABOLIC PNL TOTAL CA: CPT

## 2024-12-01 PROCEDURE — 97530 THERAPEUTIC ACTIVITIES: CPT

## 2024-12-01 PROCEDURE — 99232 SBSQ HOSP IP/OBS MODERATE 35: CPT | Performed by: PHYSICIAN ASSISTANT

## 2024-12-01 PROCEDURE — 97162 PT EVAL MOD COMPLEX 30 MIN: CPT

## 2024-12-01 PROCEDURE — 1200000003 HC TELEMETRY R&B

## 2024-12-01 RX ADMIN — Medication 1000 UNITS: at 08:43

## 2024-12-01 RX ADMIN — HYDROMORPHONE HYDROCHLORIDE 0.5 MG: 1 INJECTION, SOLUTION INTRAMUSCULAR; INTRAVENOUS; SUBCUTANEOUS at 14:49

## 2024-12-01 RX ADMIN — LEVOTHYROXINE SODIUM 50 MCG: 0.05 TABLET ORAL at 05:51

## 2024-12-01 RX ADMIN — HYDROMORPHONE HYDROCHLORIDE 0.5 MG: 1 INJECTION, SOLUTION INTRAMUSCULAR; INTRAVENOUS; SUBCUTANEOUS at 10:29

## 2024-12-01 RX ADMIN — ENOXAPARIN SODIUM 40 MG: 100 INJECTION SUBCUTANEOUS at 14:49

## 2024-12-01 RX ADMIN — SODIUM CHLORIDE, PRESERVATIVE FREE 10 ML: 5 INJECTION INTRAVENOUS at 10:29

## 2024-12-01 RX ADMIN — PAROXETINE HYDROCHLORIDE 20 MG: 20 TABLET, FILM COATED ORAL at 08:43

## 2024-12-01 RX ADMIN — PANTOPRAZOLE SODIUM 40 MG: 40 TABLET, DELAYED RELEASE ORAL at 05:51

## 2024-12-01 RX ADMIN — CYCLOBENZAPRINE 5 MG: 10 TABLET, FILM COATED ORAL at 08:41

## 2024-12-01 RX ADMIN — PANTOPRAZOLE SODIUM 40 MG: 40 TABLET, DELAYED RELEASE ORAL at 17:27

## 2024-12-01 RX ADMIN — ROPINIROLE HYDROCHLORIDE 2 MG: 1 TABLET, FILM COATED ORAL at 19:59

## 2024-12-01 RX ADMIN — ATORVASTATIN CALCIUM 80 MG: 80 TABLET, FILM COATED ORAL at 19:59

## 2024-12-01 RX ADMIN — CYCLOBENZAPRINE 5 MG: 10 TABLET, FILM COATED ORAL at 19:59

## 2024-12-01 RX ADMIN — ASPIRIN 81 MG: 81 TABLET, COATED ORAL at 08:42

## 2024-12-01 RX ADMIN — SODIUM CHLORIDE, PRESERVATIVE FREE 10 ML: 5 INJECTION INTRAVENOUS at 20:00

## 2024-12-01 RX ADMIN — Medication 1000 MG: at 08:43

## 2024-12-01 ASSESSMENT — PAIN DESCRIPTION - LOCATION
LOCATION: FOOT
LOCATION: LEG
LOCATION: LEG

## 2024-12-01 ASSESSMENT — PAIN DESCRIPTION - ORIENTATION
ORIENTATION: RIGHT

## 2024-12-01 ASSESSMENT — PAIN DESCRIPTION - DESCRIPTORS
DESCRIPTORS: ACHING
DESCRIPTORS: ACHING
DESCRIPTORS: ACHING;CRAMPING
DESCRIPTORS: ACHING

## 2024-12-01 ASSESSMENT — PAIN SCALES - GENERAL
PAINLEVEL_OUTOF10: 3
PAINLEVEL_OUTOF10: 8
PAINLEVEL_OUTOF10: 7
PAINLEVEL_OUTOF10: 3

## 2024-12-01 ASSESSMENT — PAIN DESCRIPTION - PAIN TYPE: TYPE: ACUTE PAIN

## 2024-12-01 ASSESSMENT — PAIN SCALES - WONG BAKER
WONGBAKER_NUMERICALRESPONSE: NO HURT
WONGBAKER_NUMERICALRESPONSE: NO HURT

## 2024-12-01 NOTE — PROGRESS NOTES
[facility] [place]  INPATIENT PHYSICAL THERAPY  EVALUATION  STR MED SURG 8AB - 8B-22/022-A    Discharge Recommendations: Continue to assess pending progress, Subacute/Skilled Nursing Facility, Therapy recommended at discharge  Equipment Recommendations:    monitor for needs             Time In: 1129  Time Out: 1215  Timed Code Treatment Minutes: 30 Minutes  Minutes: 46          Date: 2024  Patient Name: Vandana Monson,  Gender:  female        MRN: [id number]  : 1950  (74 y.o.)      Referring Practitioner: Randell Zamora PA  Diagnosis: Traumatic closed displaced fracture of distal fibula  Additional Pertinent Hx: Vandana Monson is a 74-year-old female with past medical history significant for nonobstructive CAD s/p CABG x 1, HFrEF, COPD, HLD, HTN who presented to [facility] on  after a fall.  Patient was attempting to plug in her [place] tree where she fell forward and landed on the left side of her ribs.  Patient ports increasing bilateral lower extremity weakness with increased frequency of falls.  Roughly 5 days prior to admission patient had a fall with onset of right ankle and foot swelling with ecchymosis.  Patient also medical attention at that time.  Following this most recent fall, family saw the swelling and encourage patient to come to ED for further evaluation.  X-ray of right foot showed mildly displaced oblique fracture of the distal fibula with mild soft tissue edema around lateral aspect of ankle.  Other trauma scans on arrival with CT head and CT C-spine were negative for acute process.  Patient was also noted to have elevated troponin and was started on heparin drip per ACS protocol for concern for NSTEMI.     Restrictions/Precautions:  Restrictions/Precautions: Fall Risk, Weight Bearing, General Precautions  Right Lower Extremity Weight Bearing: Non Weight Bearing                Subjective:  Chart Reviewed: Yes  Patient assessed for rehabilitation services?: Yes  Subjective: Pt resting  sets, Heelslides, Hip abduction/adduction, Seated marches, and Long arc quads.  Exercises were completed for increased independence with functional mobility.    Functional Outcome Measures:  AMPAC (6 CLICK) BASIC MOBILITY     AM-PAC Inpatient Mobility without Stair Climbing Raw Score : 14  AM-PAC Inpatient without Stair Climbing T-Scale Score : 40.85       Modified Adan:  Premorbid Functional Status: Not Applicable  Current Functional Status:  Not Applicable    ASSESSMENT:  Activity Tolerance:  Patient tolerance of treatment:Good.    Treatment Initiated: Treatment and education initiated within context of evaluation.  Evaluation time included review of current medical information, gathering information related to past medical, social and functional history, completion of standardized testing, formal and informal observation of tasks, assessment of data and development of plan of care and goals.  Treatment time included skilled education and facilitation of tasks to increase safety and independence with functional mobility for improved independence and quality of life.    Assessment:  Body Structures, Functions, Activity Limitations Requiring Skilled Therapeutic Intervention: Decreased functional mobility , Decreased strength, Decreased endurance, Decreased balance, Increased pain  Assessment: Pt is 73 yo female that is deconditioned and participated well. Pt was generally Independent for mobility in PLOF and is at CGA to Min A today. Pt is not safe for return to home at this time and would benefit from continued skilled PT to address strengthening, balance, bed mobility, endurance building, and functional mobility training.    Therapy Prognosis: Good    Requires PT Follow-Up: Yes    Patient Education:      .    Patient Education  Education Given To: Patient  Education Provided: Role of Therapy, Plan of Care, Home Exercise Program, Transfer Training, Precautions, Equipment  Education Method: Demonstration,

## 2024-12-01 NOTE — PROGRESS NOTES
Hospitalist Progress Note        Patient: Vandana Monson 74 y.o. female      Unit/Bed: 8B-22/022-A    Admit Date: 11/28/2024    ASSESSMENT AND PLAN  Active Problems    Takotsubo cardiomyopathy:   Concerning for NSTEMI on arrival, associated history of nonobstructive CAD.    Cardiology consulted-signed off with follow-up outpatient in 2 weeks  Stress test was positive.  [facility] 11/29/2024 which showed Takotsubo cardiomyopathy.    TTE showed EF 35 to 40%.   Continue ASA, statin, beta-blocker per cardiology recommendation   Will continue monitor strict I's and O's, daily standing weights  Lasix 40 mg, daily  On Aldactone 12.5 mg, daily    Hypotension  Hold diuretics.  Hold parameters on metoprolol   Given cardiomyopathy, will hold off on IV fluids and monitor overnight  Consider midodrine if blood pressure support needed in order to have max GDMT    Traumatic closed displaced fracture of distal fibula:   Ortho was halted continue splint and use short posterior leg splint and also as needed sugar tong/U-splint.  - Nonweightbearing at this time, continue ice elevate RLE  - Continue pain control  - PT/OT consulted  - Patient's to follow-up with [place] in 2 weeks, with Dr. Conway  -- Following PT/OT recommendations with patient being nonweightbearing for disposition as well as possible tests needs with assistance.  Patient is agreeable to SNF.  Social work notified    Leukocytosis:    Likely 2/2 reactive in the setting of tibia/fibula fracture. On arrival WBC 20.2.  Steadily downtrending.  Patient denies any shakes, fever, chills on arrival.  UA 11/28 bland.  No respiratory symptoms.  No documented fevers.     Constipation:   -Continue bowel regimen  -Will continue MiraLAX and add senna    Chronic Conditions (reviewed and stable unless otherwise stated)  CKD stage III: Noted.  Baseline creatinine 1.0.  Monitor BMP.  Chronic systolic and diastolic HFrEF: TTE 6/26/2019 showed EF of 30-35% with grade 1 diastolic dysfunction and

## 2024-12-01 NOTE — PLAN OF CARE
Problem: Chronic Conditions and Co-morbidities  Goal: Patient's chronic conditions and co-morbidity symptoms are monitored and maintained or improved  12/1/2024 1536 by Dread Lewis RN  Outcome: Progressing  12/1/2024 0536 by Chuy Hammond RN  Outcome: Progressing     Problem: Discharge Planning  Goal: Discharge to home or other facility with appropriate resources  12/1/2024 1536 by Dread Lewis RN  Outcome: Progressing  12/1/2024 0536 by Chuy Hammond RN  Outcome: Progressing  Note: Patient waiting on pt and ot to see to determine discharge plan.      Problem: Skin/Tissue Integrity  Goal: Absence of new skin breakdown  Description: 1.  Monitor for areas of redness and/or skin breakdown  2.  Assess vascular access sites hourly  3.  Every 4-6 hours minimum:  Change oxygen saturation probe site  4.  Every 4-6 hours:  If on nasal continuous positive airway pressure, respiratory therapy assess nares and determine need for appliance change or resting period.  12/1/2024 1536 by Dread Lewis RN  Outcome: Progressing  12/1/2024 0536 by Chuy Hammond RN  Outcome: Progressing  Note: No skin breakdown noted this shift. Patient encouraged to change position frequently.        Problem: Safety - Adult  Goal: Free from fall injury  12/1/2024 1536 by Dread Lewis RN  Outcome: Progressing  12/1/2024 0536 by Chuy Hammond RN  Note: Patient free from harm.       Problem: Pain  Goal: Verbalizes/displays adequate comfort level or baseline comfort level  12/1/2024 1536 by Dread Lewis RN  Outcome: Progressing  12/1/2024 0536 by Chuy Hammond RN  Outcome: Progressing  Note: Patient will have decrease pain. Patient will rate pain on a 0-10 scale. Non-pharmaceutical pain interventions will be used before medications.        Problem: ABCDS Injury Assessment  Goal: Absence of physical injury  12/1/2024 1536 by Dread Lewis RN  Outcome: Progressing  12/1/2024 0536 by Chuy Hammond RN  Note: Patient

## 2024-12-01 NOTE — PLAN OF CARE
Problem: Chronic Conditions and Co-morbidities  Goal: Patient's chronic conditions and co-morbidity symptoms are monitored and maintained or improved  12/1/2024 1547 by Dread Lewis RN  Outcome: Progressing  12/1/2024 1536 by Dread Lewis RN  Outcome: Progressing  12/1/2024 0536 by Chuy Hammond RN  Outcome: Progressing     Problem: Skin/Tissue Integrity  Goal: Absence of new skin breakdown  Description: 1.  Monitor for areas of redness and/or skin breakdown  2.  Assess vascular access sites hourly  3.  Every 4-6 hours minimum:  Change oxygen saturation probe site  4.  Every 4-6 hours:  If on nasal continuous positive airway pressure, respiratory therapy assess nares and determine need for appliance change or resting period.  12/1/2024 1547 by Dread Lewis RN  Outcome: Progressing  12/1/2024 1536 by Dread Lewis RN  Outcome: Progressing  12/1/2024 0536 by Chuy Hammond RN  Outcome: Progressing  Note: No skin breakdown noted this shift. Patient encouraged to change position frequently.        Problem: Safety - Adult  Goal: Free from fall injury  12/1/2024 1547 by Dread Lewis RN  Outcome: Progressing  12/1/2024 1536 by Dread Lewis RN  Outcome: Progressing  12/1/2024 0536 by Chuy Hammond RN  Note: Patient free from harm.

## 2024-12-01 NOTE — PROGRESS NOTES
[facility]  INPATIENT OCCUPATIONAL THERAPY  STRZ MED SURG 8AB  EVALUATION      Discharge Recommendations: Subacute/Skilled Nursing Facility, Patient would benefit from continued therapy after discharge  Equipment Recommendations:   Pt has a shower chair and elevated toilet.    Time In: 1451  Time Out: 1509  Timed Code Treatment Minutes: 0 Minutes  Minutes: 18       Date: 2024  Patient Name: Vandana Monson,   Gender: female      MRN: [id number]  : 1950  (74 y.o.)  Referring Practitioner: Randell Zamora PA  Diagnosis: Traumatic closed displaced fracture of distal fibula  Additional Pertinent Hx: Vandana Monson is a 74-year-old female with past medical history significant for nonobstructive CAD s/p CABG x 1, HFrEF, COPD, HLD, HTN who presented to [facility] on  after a fall.  Patient was attempting to plug in her [employer] tree where she fell forward and landed on the left side of her ribs.  Patient ports increasing bilateral lower extremity weakness with increased frequency of falls.  Roughly 5 days prior to admission patient had a fall with onset of right ankle and foot swelling with ecchymosis.  Patient also medical attention at that time.  Following this most recent fall, family saw the swelling and encourage patient to come to ED for further evaluation.  X-ray of right foot showed mildly displaced oblique fracture of the distal fibula with mild soft tissue edema around lateral aspect of ankle.  Other trauma scans on arrival with CT head and CT C-spine were negative for acute process.  Patient was also noted to have elevated troponin and was started on heparin drip per ACS protocol for concern for NSTEMI.    Restrictions/Precautions:  Restrictions/Precautions: Fall Risk, Weight Bearing  Right Lower Extremity Weight Bearing: Non Weight Bearing    Subjective  Chart Reviewed: Yes, Orders, Progress Notes, History and Physical  Patient assessed for rehabilitation services?: Yes    Subjective: Pt seated

## 2024-12-01 NOTE — PLAN OF CARE
Problem: Discharge Planning  Goal: Discharge to home or other facility with appropriate resources  Outcome: Progressing  Note: Patient waiting on pt and ot to see to determine discharge plan.      Problem: Skin/Tissue Integrity  Goal: Absence of new skin breakdown  Description: 1.  Monitor for areas of redness and/or skin breakdown  2.  Assess vascular access sites hourly  3.  Every 4-6 hours minimum:  Change oxygen saturation probe site  4.  Every 4-6 hours:  If on nasal continuous positive airway pressure, respiratory therapy assess nares and determine need for appliance change or resting period.  Outcome: Progressing  Note: No skin breakdown noted this shift. Patient encouraged to change position frequently.        Problem: Safety - Adult  Goal: Free from fall injury  Note: Patient free from harm.       Problem: Pain  Goal: Verbalizes/displays adequate comfort level or baseline comfort level  Outcome: Progressing  Note: Patient will have decrease pain. Patient will rate pain on a 0-10 scale. Non-pharmaceutical pain interventions will be used before medications.        Problem: ABCDS Injury Assessment  Goal: Absence of physical injury  Note: Patient free from physical injury. Bed in low locked position, call light and personal belongings within reach. Patient uses call light appropriately.

## 2024-12-02 LAB
ANION GAP SERPL CALC-SCNC: 9 MEQ/L (ref 8–16)
BUN SERPL-MCNC: 11 MG/DL (ref 7–22)
CALCIUM SERPL-MCNC: 8.5 MG/DL (ref 8.5–10.5)
CHLORIDE SERPL-SCNC: 108 MEQ/L (ref 98–111)
CO2 SERPL-SCNC: 23 MEQ/L (ref 23–33)
CREAT SERPL-MCNC: 0.8 MG/DL (ref 0.4–1.2)
DEPRECATED RDW RBC AUTO: 58.6 FL (ref 35–45)
ERYTHROCYTE [DISTWIDTH] IN BLOOD BY AUTOMATED COUNT: 14.9 % (ref 11.5–14.5)
GFR SERPL CREATININE-BSD FRML MDRD: 76 ML/MIN/1.73M2
GLUCOSE SERPL-MCNC: 122 MG/DL (ref 70–108)
HCT VFR BLD AUTO: 28.1 % (ref 37–47)
HGB BLD-MCNC: 9 GM/DL (ref 12–16)
MCH RBC QN AUTO: 34.6 PG (ref 26–33)
MCHC RBC AUTO-ENTMCNC: 32 GM/DL (ref 32.2–35.5)
MCV RBC AUTO: 108.1 FL (ref 81–99)
PLATELET # BLD AUTO: 233 THOU/MM3 (ref 130–400)
PMV BLD AUTO: 9.1 FL (ref 9.4–12.4)
POTASSIUM SERPL-SCNC: 4.3 MEQ/L (ref 3.5–5.2)
RBC # BLD AUTO: 2.6 MILL/MM3 (ref 4.2–5.4)
SODIUM SERPL-SCNC: 140 MEQ/L (ref 135–145)
WBC # BLD AUTO: 9.6 THOU/MM3 (ref 4.8–10.8)

## 2024-12-02 PROCEDURE — 6370000000 HC RX 637 (ALT 250 FOR IP)

## 2024-12-02 PROCEDURE — 97116 GAIT TRAINING THERAPY: CPT

## 2024-12-02 PROCEDURE — 99232 SBSQ HOSP IP/OBS MODERATE 35: CPT

## 2024-12-02 PROCEDURE — 1200000003 HC TELEMETRY R&B

## 2024-12-02 PROCEDURE — 80048 BASIC METABOLIC PNL TOTAL CA: CPT

## 2024-12-02 PROCEDURE — 97530 THERAPEUTIC ACTIVITIES: CPT

## 2024-12-02 PROCEDURE — 6360000002 HC RX W HCPCS

## 2024-12-02 PROCEDURE — 97110 THERAPEUTIC EXERCISES: CPT

## 2024-12-02 PROCEDURE — 36415 COLL VENOUS BLD VENIPUNCTURE: CPT

## 2024-12-02 PROCEDURE — 6370000000 HC RX 637 (ALT 250 FOR IP): Performed by: PHYSICIAN ASSISTANT

## 2024-12-02 PROCEDURE — 2580000003 HC RX 258: Performed by: INTERNAL MEDICINE

## 2024-12-02 PROCEDURE — 85027 COMPLETE CBC AUTOMATED: CPT

## 2024-12-02 PROCEDURE — 97535 SELF CARE MNGMENT TRAINING: CPT

## 2024-12-02 PROCEDURE — 6360000002 HC RX W HCPCS: Performed by: PHYSICIAN ASSISTANT

## 2024-12-02 PROCEDURE — 1200000000 HC SEMI PRIVATE

## 2024-12-02 RX ORDER — MIDODRINE HYDROCHLORIDE 2.5 MG/1
2.5 TABLET ORAL PRN
Status: DISCONTINUED | OUTPATIENT
Start: 2024-12-02 | End: 2024-12-06 | Stop reason: HOSPADM

## 2024-12-02 RX ORDER — SPIRONOLACTONE 25 MG/1
12.5 TABLET ORAL DAILY
Status: DISCONTINUED | OUTPATIENT
Start: 2024-12-02 | End: 2024-12-06 | Stop reason: HOSPADM

## 2024-12-02 RX ADMIN — PANTOPRAZOLE SODIUM 40 MG: 40 TABLET, DELAYED RELEASE ORAL at 16:43

## 2024-12-02 RX ADMIN — Medication 1000 MG: at 08:33

## 2024-12-02 RX ADMIN — PAROXETINE HYDROCHLORIDE 20 MG: 20 TABLET, FILM COATED ORAL at 08:33

## 2024-12-02 RX ADMIN — TRAZODONE HYDROCHLORIDE 200 MG: 100 TABLET ORAL at 00:25

## 2024-12-02 RX ADMIN — SENNOSIDES 17.2 MG: 8.6 TABLET, FILM COATED ORAL at 20:25

## 2024-12-02 RX ADMIN — CYCLOBENZAPRINE 5 MG: 10 TABLET, FILM COATED ORAL at 08:32

## 2024-12-02 RX ADMIN — ASPIRIN 81 MG: 81 TABLET, COATED ORAL at 08:32

## 2024-12-02 RX ADMIN — METOPROLOL SUCCINATE 25 MG: 25 TABLET, FILM COATED, EXTENDED RELEASE ORAL at 08:33

## 2024-12-02 RX ADMIN — ACETAMINOPHEN 650 MG: 325 TABLET ORAL at 16:22

## 2024-12-02 RX ADMIN — PANTOPRAZOLE SODIUM 40 MG: 40 TABLET, DELAYED RELEASE ORAL at 06:23

## 2024-12-02 RX ADMIN — SODIUM CHLORIDE, PRESERVATIVE FREE 10 ML: 5 INJECTION INTRAVENOUS at 20:27

## 2024-12-02 RX ADMIN — SPIRONOLACTONE 12.5 MG: 25 TABLET ORAL at 17:52

## 2024-12-02 RX ADMIN — ENOXAPARIN SODIUM 40 MG: 100 INJECTION SUBCUTANEOUS at 14:42

## 2024-12-02 RX ADMIN — SODIUM CHLORIDE, PRESERVATIVE FREE 10 ML: 5 INJECTION INTRAVENOUS at 08:34

## 2024-12-02 RX ADMIN — ROPINIROLE HYDROCHLORIDE 2 MG: 1 TABLET, FILM COATED ORAL at 20:25

## 2024-12-02 RX ADMIN — Medication 6 MG: at 20:25

## 2024-12-02 RX ADMIN — LEVOTHYROXINE SODIUM 50 MCG: 0.05 TABLET ORAL at 06:23

## 2024-12-02 RX ADMIN — HYDROMORPHONE HYDROCHLORIDE 0.5 MG: 1 INJECTION, SOLUTION INTRAMUSCULAR; INTRAVENOUS; SUBCUTANEOUS at 17:53

## 2024-12-02 RX ADMIN — Medication 1000 UNITS: at 08:33

## 2024-12-02 RX ADMIN — ACETAMINOPHEN 650 MG: 325 TABLET ORAL at 08:33

## 2024-12-02 RX ADMIN — HYDROMORPHONE HYDROCHLORIDE 0.5 MG: 1 INJECTION, SOLUTION INTRAMUSCULAR; INTRAVENOUS; SUBCUTANEOUS at 21:56

## 2024-12-02 RX ADMIN — HYDROMORPHONE HYDROCHLORIDE 0.5 MG: 1 INJECTION, SOLUTION INTRAMUSCULAR; INTRAVENOUS; SUBCUTANEOUS at 01:16

## 2024-12-02 RX ADMIN — TRAZODONE HYDROCHLORIDE 200 MG: 100 TABLET ORAL at 20:25

## 2024-12-02 RX ADMIN — ATORVASTATIN CALCIUM 80 MG: 80 TABLET, FILM COATED ORAL at 20:25

## 2024-12-02 RX ADMIN — Medication 6 MG: at 00:25

## 2024-12-02 RX ADMIN — CYCLOBENZAPRINE 5 MG: 10 TABLET, FILM COATED ORAL at 20:25

## 2024-12-02 ASSESSMENT — PAIN DESCRIPTION - LOCATION
LOCATION: RIB CAGE
LOCATION: RIB CAGE
LOCATION: FLANK

## 2024-12-02 ASSESSMENT — PAIN SCALES - GENERAL
PAINLEVEL_OUTOF10: 7
PAINLEVEL_OUTOF10: 3
PAINLEVEL_OUTOF10: 8
PAINLEVEL_OUTOF10: 6
PAINLEVEL_OUTOF10: 5
PAINLEVEL_OUTOF10: 3
PAINLEVEL_OUTOF10: 3
PAINLEVEL_OUTOF10: 8

## 2024-12-02 ASSESSMENT — PAIN DESCRIPTION - ORIENTATION
ORIENTATION: LEFT

## 2024-12-02 ASSESSMENT — LIFESTYLE VARIABLES
HOW OFTEN DO YOU HAVE A DRINK CONTAINING ALCOHOL: 4 OR MORE TIMES A WEEK
HOW MANY STANDARD DRINKS CONTAINING ALCOHOL DO YOU HAVE ON A TYPICAL DAY: 1 OR 2

## 2024-12-02 ASSESSMENT — PATIENT HEALTH QUESTIONNAIRE - PHQ9
SUM OF ALL RESPONSES TO PHQ QUESTIONS 1-9: 1
SUM OF ALL RESPONSES TO PHQ9 QUESTIONS 1 & 2: 1
1. LITTLE INTEREST OR PLEASURE IN DOING THINGS: NOT AT ALL
SUM OF ALL RESPONSES TO PHQ QUESTIONS 1-9: 1
SUM OF ALL RESPONSES TO PHQ QUESTIONS 1-9: 1
2. FEELING DOWN, DEPRESSED OR HOPELESS: SEVERAL DAYS
SUM OF ALL RESPONSES TO PHQ QUESTIONS 1-9: 1

## 2024-12-02 ASSESSMENT — PAIN DESCRIPTION - DESCRIPTORS
DESCRIPTORS: ACHING
DESCRIPTORS: ACHING

## 2024-12-02 NOTE — PROGRESS NOTES
Brief Intervention and Referral to Treatment Summary    Patient was provided PHQ-9, AUDIT-C and DAST Screening:      PHQ-9 Score: 1  AUDIT-C Score:  4  DAST Score:  0    Patient’s substance use is considered     Moderate Risk      Patient’s depression is considered:     Minimal    Brief Education Was Provided    N/A      Brief Intervention Is Provided (Only for AUDIT-C or DAST)     N/A      Injured Trauma Survivor Screening  1.  When you were injured or right afterward   Did you think you were going to die? RESPONSES; YES+1/NO: NO  Do you think this was done to you intentionally? NO    Since your injury  Have you felt more restless, tense or jumpy than usual? RESPONSES; YES+1/NO: NO  Have you found yourself unable to stop worrying? RESPONSES; YES+1/NO: NO  Do you find yourself thinking that the world is unsafe and that people are not to be trusted? RESPONSES; YES+1/NO: NO    TOTAL SCORE from ITSS Questions 1 and 2: 0  NOTE: A score of greater than or equal to 2 is considered positive for PTSD risk and is to receive a community resource packet to link with appropriate providers.    Recommendations/Referrals for Brief and/or Specialized Treatment Provided to Patient:  N/A

## 2024-12-02 NOTE — PLAN OF CARE

## 2024-12-02 NOTE — CARE COORDINATION
12/2/24, 3:27 PM EST    DISCHARGE ON GOING EVALUATION    [facility]       [facility] day: 4  Location: -22/022-A Reason for admit: General weakness [R53.1]  NSTEMI (non-ST elevated myocardial infarction) ([facility]) [I21.4]  ANI (acute kidney injury) ([facility]) [N17.9]  Traumatic closed displaced fracture of distal fibula [S82.835F]  Closed trimalleolar fracture of right ankle, initial encounter [S82.963K]     Procedures:   11/29 [facility]  11/29 Stress test    Imaging since last note: no new    Barriers to Discharge: Medically clear for discharge, hospitalist monitoring labs and vitals in the meantime. PT/OT. Await SNF acceptance.     PCP: Aurelio Worrell MD  Readmission Risk Score: 16    Patient Goals/Plan/Treatment Preferences: From home alone. Baseline O2 2L through [facility]. Plans to discharge to [facility]. Follow  notes for updates.

## 2024-12-02 NOTE — PROGRESS NOTES
[facility]  INPATIENT PHYSICAL THERAPY  DAILY NOTE  STRZ MED SURG 8AB - 8B-22/022-A      Discharge Recommendations: Continue to assess pending progress and Subacte/Skilled Nursing Facility  Equipment Recommendations:    monitor for needs           Time In: 1553  Time Out: 1619  Timed Code Treatment Minutes: 26 Minutes  Minutes: 26          Date: 2024  Patient Name: Vandana Monson,  Gender:  female        MRN: [id number]  : 1950  (74 y.o.)     Referring Practitioner: Randell Zamora PA  Diagnosis: Traumatic closed displaced fracture of distal fibula  Additional Pertinent Hx: Vandana Monson is a 74-year-old female with past medical history significant for nonobstructive CAD s/p CABG x 1, HFrEF, COPD, HLD, HTN who presented to [facility] on  after a fall.  Patient was attempting to plug in her [employer] tree where she fell forward and landed on the left side of her ribs.  Patient ports increasing bilateral lower extremity weakness with increased frequency of falls.  Roughly 5 days prior to admission patient had a fall with onset of right ankle and foot swelling with ecchymosis.  Patient also medical attention at that time.  Following this most recent fall, family saw the swelling and encourage patient to come to ED for further evaluation.  X-ray of right foot showed mildly displaced oblique fracture of the distal fibula with mild soft tissue edema around lateral aspect of ankle.  Other trauma scans on arrival with CT head and CT C-spine were negative for acute process.  Patient was also noted to have elevated troponin and was started on heparin drip per ACS protocol for concern for NSTEMI.     Prior Level of Function:  Lives With: Alone  Type of Home: House  Home Layout: One level  Home Access: Ramped entrance  Home Equipment: Cane, Rollator   Bathroom Shower/Tub: Tub/Shower unit  Bathroom Toilet: Handicap height  Bathroom Equipment: Toilet raiser, Grab bars around toilet, Grab bars in shower, Shower

## 2024-12-02 NOTE — CARE COORDINATION
12/2/24, 8:46 AM EST    DISCHARGE PLANNING EVALUATION    SW consult received for \"placement to [facility] in [place]\".    SW left message with admissions to call DANILO regarding referral.  Pt will require pre-cert.     9:58 AM update:  DANILO received call from Aileen with [facility], they are in network with [employer]. Clinicals faxed for consideration.    DANILO requested pre-cert be started if able to accept.  Await decision.     2:45 PM update:  DANILO contacted Aileen with [facility], she stated that the DON still has to review to make a decision.

## 2024-12-02 NOTE — PROGRESS NOTES
Hospitalist Progress Note      Patient:  Vandana Monson 74 y.o. female     : 1950  Unit/Bed:[facility]22/022-A  Date of Admission: 2024    ASSESSMENT AND PLAN    Active Problems  Takotsubo Cardiomyopathy:  New problem, found during admission.  Concern for NSTEMI given history of nonobstructive CAD.  Cardiology was consulted.  Stress test with positive, [facility]  showed positive for Takotsubo cardiomyopathy.  TTE showed reduced EF of 35 to 40%.  Cardiology signed off with outpatient follow-up 2 weeks after discharge.  Per cardiology recommendation, continue ASA, statin, beta-blocker, strict I's and O's, daily standing weights, and daily Aldactone with hold parameters.  Hypotension:  New problem, possibly due to cardiomyopathy.  Due to cardiomyopathy, hold IVF  Hold Lasix  Continue metoprolol based on parameters and start aldactone based on parameters  If blood pressure support is needed start midodrine PRN.  Traumatic Closed Displaced Fracture of Distal Fibula:  New problem following fall.  PT OT consulted.  Ortho was consulted and recommends splint and use short posterior leg splint and also as needed sugar tong/U-splint.   Ortho signed off with patient follow-up at [facility] in 2 weeks  Continue nonweightbearing, ice, elevation, pain control.  Pt agrees to go to SNF. Social work consulted for placement  Constipation:  Chronic problem   Continue bowel regimen  Continue MiraLAX and senna    Resolved Conditions  Leukocytosis:  Likely due to fracture, resolved now.   WBC 20.2 at admission. Now 9.6  Pt denies symptoms.  No documented fevers  UA  negative    Chronic Conditions (reviewed and stable unless otherwise stated)  CKD III: Baseline creatinine 1.0. Monitor BMP.   HFrEF: TTE 2019 showed EF of 30-35% with grade 1 diastolic dysfunction and severe global hypokinesis of left ventricle seen. Home medication: Toprol, Aldactone, Lasix.  TTE 2024 showed EF of 35 to 40% with abnormal diastolic function.

## 2024-12-03 PROCEDURE — 6370000000 HC RX 637 (ALT 250 FOR IP)

## 2024-12-03 PROCEDURE — 97110 THERAPEUTIC EXERCISES: CPT

## 2024-12-03 PROCEDURE — 97530 THERAPEUTIC ACTIVITIES: CPT

## 2024-12-03 PROCEDURE — 1200000003 HC TELEMETRY R&B

## 2024-12-03 PROCEDURE — 6360000002 HC RX W HCPCS: Performed by: PHYSICIAN ASSISTANT

## 2024-12-03 PROCEDURE — 6360000002 HC RX W HCPCS

## 2024-12-03 PROCEDURE — 99232 SBSQ HOSP IP/OBS MODERATE 35: CPT

## 2024-12-03 PROCEDURE — 1200000000 HC SEMI PRIVATE

## 2024-12-03 PROCEDURE — 2580000003 HC RX 258: Performed by: INTERNAL MEDICINE

## 2024-12-03 PROCEDURE — 6370000000 HC RX 637 (ALT 250 FOR IP): Performed by: PHYSICIAN ASSISTANT

## 2024-12-03 RX ORDER — FUROSEMIDE 20 MG/1
20 TABLET ORAL DAILY
Status: DISCONTINUED | OUTPATIENT
Start: 2024-12-03 | End: 2024-12-06 | Stop reason: HOSPADM

## 2024-12-03 RX ORDER — HYDROCODONE BITARTRATE AND ACETAMINOPHEN 5; 325 MG/1; MG/1
1 TABLET ORAL EVERY 4 HOURS PRN
Status: DISCONTINUED | OUTPATIENT
Start: 2024-12-03 | End: 2024-12-06 | Stop reason: HOSPADM

## 2024-12-03 RX ORDER — FUROSEMIDE 40 MG/1
40 TABLET ORAL DAILY
Status: CANCELLED | OUTPATIENT
Start: 2024-12-04

## 2024-12-03 RX ORDER — HYDROCODONE BITARTRATE AND ACETAMINOPHEN 5; 325 MG/1; MG/1
2 TABLET ORAL EVERY 4 HOURS PRN
Status: DISCONTINUED | OUTPATIENT
Start: 2024-12-03 | End: 2024-12-06 | Stop reason: HOSPADM

## 2024-12-03 RX ADMIN — TRAZODONE HYDROCHLORIDE 200 MG: 100 TABLET ORAL at 20:48

## 2024-12-03 RX ADMIN — HYDROCODONE BITARTRATE AND ACETAMINOPHEN 2 TABLET: 5; 325 TABLET ORAL at 22:24

## 2024-12-03 RX ADMIN — Medication 1000 MG: at 08:23

## 2024-12-03 RX ADMIN — FUROSEMIDE 20 MG: 20 TABLET ORAL at 13:18

## 2024-12-03 RX ADMIN — SODIUM CHLORIDE, PRESERVATIVE FREE 10 ML: 5 INJECTION INTRAVENOUS at 08:24

## 2024-12-03 RX ADMIN — PANTOPRAZOLE SODIUM 40 MG: 40 TABLET, DELAYED RELEASE ORAL at 16:47

## 2024-12-03 RX ADMIN — PAROXETINE HYDROCHLORIDE 20 MG: 20 TABLET, FILM COATED ORAL at 08:23

## 2024-12-03 RX ADMIN — ROPINIROLE HYDROCHLORIDE 2 MG: 1 TABLET, FILM COATED ORAL at 20:47

## 2024-12-03 RX ADMIN — SODIUM CHLORIDE, PRESERVATIVE FREE 10 ML: 5 INJECTION INTRAVENOUS at 20:48

## 2024-12-03 RX ADMIN — ENOXAPARIN SODIUM 40 MG: 100 INJECTION SUBCUTANEOUS at 13:19

## 2024-12-03 RX ADMIN — LEVOTHYROXINE SODIUM 50 MCG: 0.05 TABLET ORAL at 06:01

## 2024-12-03 RX ADMIN — ATORVASTATIN CALCIUM 80 MG: 80 TABLET, FILM COATED ORAL at 20:47

## 2024-12-03 RX ADMIN — PANTOPRAZOLE SODIUM 40 MG: 40 TABLET, DELAYED RELEASE ORAL at 06:01

## 2024-12-03 RX ADMIN — ASPIRIN 81 MG: 81 TABLET, COATED ORAL at 08:22

## 2024-12-03 RX ADMIN — HYDROMORPHONE HYDROCHLORIDE 0.5 MG: 1 INJECTION, SOLUTION INTRAMUSCULAR; INTRAVENOUS; SUBCUTANEOUS at 11:40

## 2024-12-03 RX ADMIN — Medication 6 MG: at 20:47

## 2024-12-03 RX ADMIN — SENNOSIDES 17.2 MG: 8.6 TABLET, FILM COATED ORAL at 20:47

## 2024-12-03 RX ADMIN — CYCLOBENZAPRINE 5 MG: 10 TABLET, FILM COATED ORAL at 20:47

## 2024-12-03 RX ADMIN — SPIRONOLACTONE 12.5 MG: 25 TABLET ORAL at 08:23

## 2024-12-03 RX ADMIN — CYCLOBENZAPRINE 5 MG: 10 TABLET, FILM COATED ORAL at 08:22

## 2024-12-03 RX ADMIN — HYDROCODONE BITARTRATE AND ACETAMINOPHEN 2 TABLET: 5; 325 TABLET ORAL at 13:18

## 2024-12-03 RX ADMIN — Medication 1000 UNITS: at 08:23

## 2024-12-03 RX ADMIN — METOPROLOL SUCCINATE 25 MG: 25 TABLET, FILM COATED, EXTENDED RELEASE ORAL at 08:23

## 2024-12-03 ASSESSMENT — PAIN SCALES - GENERAL
PAINLEVEL_OUTOF10: 7
PAINLEVEL_OUTOF10: 7
PAINLEVEL_OUTOF10: 4
PAINLEVEL_OUTOF10: 3
PAINLEVEL_OUTOF10: 7

## 2024-12-03 ASSESSMENT — PAIN DESCRIPTION - LOCATION: LOCATION: ABDOMEN

## 2024-12-03 ASSESSMENT — PAIN DESCRIPTION - ORIENTATION
ORIENTATION: RIGHT
ORIENTATION: RIGHT

## 2024-12-03 ASSESSMENT — PAIN DESCRIPTION - DESCRIPTORS: DESCRIPTORS: ACHING

## 2024-12-03 NOTE — CARE COORDINATION
12/3/24, 1:40 PM EST    DISCHARGE ON GOING EVALUATION    [facility]       [facility] day: 5  Location: -22/022-A Reason for admit: General weakness [R53.1]  NSTEMI (non-ST elevated myocardial infarction) ([facility]) [I21.4]  ANI (acute kidney injury) ([facility]) [N17.9]  Traumatic closed displaced fracture of distal fibula [S82.830D]  Closed trimalleolar fracture of right ankle, initial encounter [S82.911R]     Procedures:   11/29 Echo: EF 35-40%.   11/29 Stress Test: The study is positive for myocardial ischemia and infarction. Findings suggest a high risk of cardiac events.   11/29 LHC: Takotsubo cardiomyopathy     Imaging since last note: none     Barriers to Discharge: Hospitalist following. PT/OT. Medically stable for discharge. Precert for facility.     PCP: Aurelio Worrell MD  Readmission Risk Score: 15    Patient Goals/Plan/Treatment Preferences: Accepted to [facility]. Facility starting precert today. SW following.

## 2024-12-03 NOTE — CARE COORDINATION
12/3/24, 4:46 PM EST    DISCHARGE PLANNING EVALUATION    Spoke with Aileen from [facility], they have accepted and will start precert.  Faxed updated therapy notes.    Spoke with patient, [facility] has accepted and will start precert.  She will talk with daughter about transportation.

## 2024-12-03 NOTE — PROGRESS NOTES
[facility]  INPATIENT PHYSICAL THERAPY  DAILY NOTE  STRZ MED SURG 8AB - 8B-22/022-A      Discharge Recommendations: Subacte/Skilled Nursing Facility and Patient would benefit from continued PT at discharge  Equipment Recommendations:    monitor for needs            Time In: 1321  Time Out: 1347  Timed Code Treatment Minutes: 26 Minutes  Minutes: 26          Date: 12/3/2024  Patient Name: Vandana Monson,  Gender:  female        MRN: [id number]  : 1950  (74 y.o.)     Referring Practitioner: Randell Zamora PA  Diagnosis: Traumatic closed displaced fracture of distal fibula  Additional Pertinent Hx: Vandana Monson is a 74-year-old female with past medical history significant for nonobstructive CAD s/p CABG x 1, HFrEF, COPD, HLD, HTN who presented to [facility] on  after a fall.  Patient was attempting to plug in her [employer] tree where she fell forward and landed on the left side of her ribs.  Patient ports increasing bilateral lower extremity weakness with increased frequency of falls.  Roughly 5 days prior to admission patient had a fall with onset of right ankle and foot swelling with ecchymosis.  Patient also medical attention at that time.  Following this most recent fall, family saw the swelling and encourage patient to come to ED for further evaluation.  X-ray of right foot showed mildly displaced oblique fracture of the distal fibula with mild soft tissue edema around lateral aspect of ankle.  Other trauma scans on arrival with CT head and CT C-spine were negative for acute process.  Patient was also noted to have elevated troponin and was started on heparin drip per ACS protocol for concern for NSTEMI.     Prior Level of Function:  Lives With: Alone  Type of Home: House  Home Layout: One level  Home Access: Ramped entrance  Home Equipment: Cane, Rollator   Bathroom Shower/Tub: Tub/Shower unit  Bathroom Toilet: Handicap height  Bathroom Equipment: Toilet raiser, Grab bars around toilet, Grab bars

## 2024-12-03 NOTE — PROGRESS NOTES
Hospitalist Progress Note      Patient:  Vandana Monson 74 y.o. female     : 1950  Unit/Bed:[facility]22/022-A  Date of Admission: 2024    ASSESSMENT AND PLAN    Active Problems  Takotsubo Cardiomyopathy:  New problem, found during admission.  Concern for NSTEMI given history of nonobstructive CAD.  Cardiology was consulted.  Stress test with positive, [facility]  showed positive for Takotsubo cardiomyopathy.  TTE showed reduced EF of 35 to 40%.  Cardiology signed off with outpatient follow-up 2 weeks after discharge.  Per cardiology recommendation, continue ASA, statin, beta-blocker, strict I's and O's, daily standing weights, and daily Lasix and Aldactone with hold parameters.  Hypotension:  New problem, possibly due to cardiomyopathy.  Due to cardiomyopathy, hold IVF  BP continuing to stabilize. Continue metoprolol and aldactone based on parameters. Start Lasix 20 mg  with hold parameters.   If blood pressure support is needed, start midodrine PRN.  Traumatic Closed Displaced Fracture of Distal Fibula:  New problem following fall.  PT OT consulted.  Ortho was consulted and recommends splint and use short posterior leg splint and also as needed sugar tong/U-splint.   Ortho signed off with patient follow-up at [facility] in 2 weeks  Continue nonweightbearing, ice, elevation.   Transition from IV to PO medication for pain control for longer duration of relief.   Social work to work on SNF placement  Constipation:  Chronic problem   Continue bowel regimen  Continue MiraLAX and senna     Resolved Conditions  Leukocytosis:  Likely due to fracture, resolved now.   WBC 20.2 at admission. Now 9.6  Pt denies symptoms.  No documented fevers  UA  negative     Chronic Conditions (reviewed and stable unless otherwise stated)  CKD III: Baseline creatinine 1.0. Monitor BMP.   HFrEF: TTE 2019 showed EF of 30-35% with grade 1 diastolic dysfunction and severe global hypokinesis of left ventricle seen. Home medication:

## 2024-12-04 PROCEDURE — 99231 SBSQ HOSP IP/OBS SF/LOW 25: CPT | Performed by: PHYSICIAN ASSISTANT

## 2024-12-04 PROCEDURE — 6370000000 HC RX 637 (ALT 250 FOR IP)

## 2024-12-04 PROCEDURE — 6360000002 HC RX W HCPCS

## 2024-12-04 PROCEDURE — 97530 THERAPEUTIC ACTIVITIES: CPT

## 2024-12-04 PROCEDURE — 6370000000 HC RX 637 (ALT 250 FOR IP): Performed by: PHYSICIAN ASSISTANT

## 2024-12-04 PROCEDURE — 1200000000 HC SEMI PRIVATE

## 2024-12-04 PROCEDURE — 97110 THERAPEUTIC EXERCISES: CPT

## 2024-12-04 PROCEDURE — 2580000003 HC RX 258: Performed by: INTERNAL MEDICINE

## 2024-12-04 RX ADMIN — ATORVASTATIN CALCIUM 80 MG: 80 TABLET, FILM COATED ORAL at 20:26

## 2024-12-04 RX ADMIN — Medication 1000 MG: at 09:54

## 2024-12-04 RX ADMIN — HYDROCODONE BITARTRATE AND ACETAMINOPHEN 2 TABLET: 5; 325 TABLET ORAL at 20:26

## 2024-12-04 RX ADMIN — PANTOPRAZOLE SODIUM 40 MG: 40 TABLET, DELAYED RELEASE ORAL at 15:53

## 2024-12-04 RX ADMIN — SPIRONOLACTONE 12.5 MG: 25 TABLET ORAL at 09:54

## 2024-12-04 RX ADMIN — CYCLOBENZAPRINE 5 MG: 10 TABLET, FILM COATED ORAL at 20:27

## 2024-12-04 RX ADMIN — SODIUM CHLORIDE, PRESERVATIVE FREE 10 ML: 5 INJECTION INTRAVENOUS at 09:53

## 2024-12-04 RX ADMIN — Medication 1000 UNITS: at 09:53

## 2024-12-04 RX ADMIN — SODIUM CHLORIDE, PRESERVATIVE FREE 10 ML: 5 INJECTION INTRAVENOUS at 22:33

## 2024-12-04 RX ADMIN — ROPINIROLE HYDROCHLORIDE 2 MG: 1 TABLET, FILM COATED ORAL at 20:26

## 2024-12-04 RX ADMIN — ASPIRIN 81 MG: 81 TABLET, COATED ORAL at 09:53

## 2024-12-04 RX ADMIN — HYDROCODONE BITARTRATE AND ACETAMINOPHEN 2 TABLET: 5; 325 TABLET ORAL at 09:54

## 2024-12-04 RX ADMIN — ENOXAPARIN SODIUM 40 MG: 100 INJECTION SUBCUTANEOUS at 15:52

## 2024-12-04 RX ADMIN — PAROXETINE HYDROCHLORIDE 20 MG: 20 TABLET, FILM COATED ORAL at 09:54

## 2024-12-04 RX ADMIN — Medication 6 MG: at 20:26

## 2024-12-04 RX ADMIN — TRAZODONE HYDROCHLORIDE 200 MG: 100 TABLET ORAL at 20:27

## 2024-12-04 RX ADMIN — LEVOTHYROXINE SODIUM 50 MCG: 0.05 TABLET ORAL at 04:39

## 2024-12-04 RX ADMIN — FUROSEMIDE 20 MG: 20 TABLET ORAL at 09:53

## 2024-12-04 RX ADMIN — CYCLOBENZAPRINE 5 MG: 10 TABLET, FILM COATED ORAL at 09:53

## 2024-12-04 RX ADMIN — SENNOSIDES 17.2 MG: 8.6 TABLET, FILM COATED ORAL at 20:26

## 2024-12-04 RX ADMIN — METOPROLOL SUCCINATE 25 MG: 25 TABLET, FILM COATED, EXTENDED RELEASE ORAL at 09:54

## 2024-12-04 RX ADMIN — PANTOPRAZOLE SODIUM 40 MG: 40 TABLET, DELAYED RELEASE ORAL at 04:39

## 2024-12-04 ASSESSMENT — PAIN DESCRIPTION - LOCATION: LOCATION: RIB CAGE

## 2024-12-04 ASSESSMENT — PAIN SCALES - GENERAL: PAINLEVEL_OUTOF10: 7

## 2024-12-04 ASSESSMENT — PAIN DESCRIPTION - DESCRIPTORS: DESCRIPTORS: ACHING

## 2024-12-04 ASSESSMENT — PAIN DESCRIPTION - ORIENTATION: ORIENTATION: LEFT

## 2024-12-04 NOTE — PROGRESS NOTES
Comprehensive Nutrition Assessment    Type and Reason for Visit: Initial, LOS    Nutrition Recommendations/Plan:   Continue diet as ordered.   Initiate Ensure Plus with lunch per pt preference.      Malnutrition Assessment:  Malnutrition Status: At risk for malnutrition  Context: Acute Illness       Findings of the 6 clinical characteristics of malnutrition:  Energy Intake:  Mild decrease in energy intake  Weight Loss:  No weight loss     Body Fat Loss:   (age related)     Muscle Mass Loss:   (age related)    Fluid Accumulation:  Mild Extremities   Strength:  Not Performed    Nutrition Assessment:    Pt. At low nutrition risk per RD evaluation. PMHx CAD, COPD, HLD, HTN, IBS, thyroid disease. Admitted s/p fall- went to plug in her [employer] tree and had a spill. Precert pending for discharge.     Nutrition Related Findings:    Pt. Report/Treatments/Miscellaneous: Patient seen, sitting up in bed. Per patient her appetite has been good. She usually eats 3 small meals per day- coffee and cookies for breakfast, frozen meal for lunch, and bowl of cereal for dinner. She usually drinks a Boost some time during the day as well. Patient would like to continue to receive 1 ONS per day.   GI Status: Last BM 11/30  Wound: None   Edema: generalized non-pitting edema, per flowsheet   Pertinent Labs:   No results found for: \"GLUF\", \"LABA1C\"  Recent Labs     12/02/24  0535      K 4.3      GLUCOSE 122*   BUN 11   CREATININE 0.8     Pertinent Medications: furosemide, senna, metoprolol, statin, levothyroxine, fish oil, protonix, vitamin D    Current Nutrition Intake & Therapies:    Recent PO intake: 51-75%  Recent Supplement Intake: None Ordered    - Current intake likely meets estimated needs.   ADULT DIET; Regular; Low Sodium (2 gm)  ADULT ORAL NUTRITION SUPPLEMENT; Lunch; Standard High Calorie/High Protein Oral Supplement    Anthropometric Measures:  Height: 154.9 cm (5' 1\")  Ideal Body Weight (IBW): 105

## 2024-12-04 NOTE — DISCHARGE INSTR - COC
History of Falls (last 30 days) and At Risk for Falls    Impairments/Disabilities:      None    Nutrition Therapy:  Current Nutrition Therapy:   - Oral Diet:  General    Routes of Feeding: Oral  Liquids: Thin Liquids  Daily Fluid Restriction: no  Last Modified Barium Swallow with Video (Video Swallowing Test): not done    Treatments at the Time of Hospital Discharge:   Respiratory Treatments: na  Oxygen Therapy:  is not on home oxygen therapy.  Ventilator:    - No ventilator support    Rehab Therapies: Physical Therapy and Occupational Therapy  Weight Bearing Status/Restrictions: No weight bearing restrictions  Other Medical Equipment (for information only, NOT a DME order):  walker  Other Treatments: na    Patient's personal belongings (please select all that are sent with patient):  None    RN SIGNATURE:  Electronically signed by Kylah Marin RN on 12/6/24 at 4:36 PM EST    CASE MANAGEMENT/SOCIAL WORK SECTION    Inpatient Status Date: 11/28/2024    Readmission Risk Assessment Score:  Readmission Risk              Risk of Unplanned Readmission:  16           Discharging to Facility/ Agency   Name: [facility]  Address: [address] [place] [address]  Phone: [phone]  Fax: [phone]    Dialysis Facility (if applicable)   Name:  Address:  Dialysis Schedule:  Phone:  Fax:    / signature: Electronically signed by PETAR Morrow on 12/4/24 at 2:13 PM EST    PHYSICIAN SECTION    Prognosis: Fair    Condition at Discharge: Stable    Rehab Potential (if transferring to Rehab): Fair    Recommended Labs or Other Treatments After Discharge: Ortho follow up. PT & OT     Physician Certification: I certify the above information and transfer of Vandana Monson  is necessary for the continuing treatment of the diagnosis listed and that she requires Skilled Nursing Facility for less 30 days.     Update Admission H&P: No change in H&P    PHYSICIAN SIGNATURE:   Electronically signed by ORVILLE Conklin on 12/6/24 at 3:16 PM EST

## 2024-12-04 NOTE — PROGRESS NOTES
Hospitalist Progress Note      Patient:  Vandana Monson 74 y.o. female     : 1950  Unit/Bed:[facility]22/022-A  Date of Admission: 2024    ASSESSMENT AND PLAN    Active Problems  Takotsubo Cardiomyopathy:  New problem, found during admission.  Concern for NSTEMI given history of nonobstructive CAD.  Cardiology was consulted.  Stress test with positive, [facility]  showed positive for Takotsubo cardiomyopathy.  TTE showed reduced EF of 35 to 40%.  Cardiology signed off with outpatient follow-up 2 weeks after discharge.  Per cardiology recommendation, continue ASA, statin, beta-blocker, strict I's and O's, daily standing weights, and daily Lasix and Aldactone with hold parameters.  Hypotension- resolved  New problem, possibly due to cardiomyopathy.  BP continuing to stabilize. Continue metoprolol, aldactone, Lasix 20 mg  with hold parameters.   Midodrine PRN to allow for GDMT   Traumatic Closed Displaced Fracture of Distal Fibula:  New problem following fall.  PT OT consulted.  Ortho was consulted and recommends splint and use short posterior leg splint and also as needed sugar tong/U-splint.   Ortho signed off with patient follow-up at [facility] in 2 weeks  Continue nonweightbearing, ice, elevation.   Social work to work on SNF placement  Constipation:  Chronic problem   Continue bowel regimen  Continue MiraLAX and senna     Resolved Conditions  Leukocytosis:  Likely due to fracture, resolved now.   Pt denies symptoms.  No documented fevers  UA  negative     Chronic Conditions (reviewed and stable unless otherwise stated)  CKD III: Baseline creatinine 1.0. Monitor BMP.   HFrEF: TTE 2019 showed EF of 30-35% with grade 1 diastolic dysfunction and severe global hypokinesis of left ventricle seen. Home medication: Toprol, Aldactone, Lasix.  TTE 2024 showed EF of 35 to 40% with abnormal diastolic function. Akinesis of apex. Mild MR. Mild TR.   Nonobstructive CAD: Continue ASA 81 mg daily, Lipitor 80 mg  nightly   Microcytic Anemia: Chronic problem. Baseline hemoglobin 9. Hemoglobin on arrival 11.3. 11/29/2024 ferritin 109, iron 81, TIBC 203, iron sat 40, folate greater than 20, vitamin B12 800. Monitor CBC   Hypothyroidism: Continue Synthroid 50 mcg daily   HTN: Continue Toprol 25 mg daily, Aldactone 12.5 mg daily   RLS: Continue Requip 2 mg nightly   Anxiety/Depression: Continue Paxil 20 mg daily       LDA: []CVC / []PICC / []Midline / []Nelson / []Drains / []Mediport / [x]None  Antibiotics: None  Steroids: None  Labs (still needed?): []Yes / [x]No  IVF (still needed?): []Yes / [x]No    Level of care: []Step Down / [x]Med-Surg  Bed Status: [x]Inpatient / []Observation  Telemetry: [x]Yes / []No  PT/OT: [x]Yes / []No    DVT Prophylaxis: [x] Lovenox / [] Heparin / [] SCDs / [] Already on Systemic Anticoagulation / [] None     Expected discharge date:  12/04/2024  Disposition: SNF     Code status: Full Code     ===================================================================    Chief Complaint:  jorge Monson is a 74-year-old female with past medical history significant for nonobstructive CAD s/p CABG x 1, HFrEF, COPD, HLD, HTN who presented to [facility] on 11/28 after a fall.  Patient was attempting to plug in her [place] tree where she fell forward and landed on the left side of her ribs.  Patient ports increasing bilateral lower extremity weakness with increased frequency of falls.  Roughly 5 days prior to admission patient had a fall with onset of right ankle and foot swelling with ecchymosis.  Patient also medical attention at that time.  Following this most recent fall, family saw the swelling and encourage patient to come to ED for further evaluation.  X-ray of right foot showed mildly displaced oblique fracture of the distal fibula with mild soft tissue edema around lateral aspect of ankle.  Other trauma scans on arrival with CT head and CT C-spine were negative for acute process.  Patient was also noted  plavix ordered  Troponin trending down  Midodrine ordered  Currently requiring Levophed, wean as tolerated  Maintain MAP >65    Pulmonary: History of smoking  Maintain oxygen sats >92%  Chest x-ray negative for any acute process.    GI/Nutrition: Patient has had chronic diarrhea for approximately 2 months, patient has 1-2 bouts per day.  No immediate need for GI consultation at this time.  Ulcer Prophylaxis: Not Indicated  Diet: Renal and cardiac diet.  Last BM: Today    Renal/Fluid/Electrolyte: Patient likely had HILARIO leading to metabolic acidosis secondary to dehydration and decreased dietary intake, medication complications due to lisinopril and oral potassium outpatient.  Currently on bicarb drip  ABG last night with PH 7.337, PCO2 29.7, PO2 92.5, HCO3 15.9  Follow-up nephrology  Monitor electrolytes, replace PRN   Avoid nephrotoxic medications  Renal US unremarkable     ID  Afebrile  Monitor for fever and leucocytosis  Antimicrobials: None at this time    Hematology:  HBG of 8 trending down    Endocrine:   Monitor blood glucose, no history of diabetes.    Musculoskeletal/Skin:  No wounds noted    DVT Prophylaxis  EPC Cuffs, heparin     WEAN PER PROTOCOL:  [] No   [x] Yes  [] N/A    ICU PROPHYLAXIS:  Stress ulcer:  [] PPI Agent  [] [id number] [] Sucralfate  [x] Other:  VTE:   [x] Enoxaparin  [] Unfract. Heparin Subcut  [x] EPC Cuffs    NUTRITION:  [] NPO [] Tube Feeding (Specify: ) [] TPN  [x] PO    HOME MEDS RECONCILED: [] No  [x] Yes    CONSULTATION NEEDED:  [] No  [x] Yes    FAMILY UPDATED:    [x] No  [] Yes    TRANSFER OUT OF ICU:   [x] No  [] Yes      Karen Ortiz M.D.  Emergency Medicine Resident, PGY-2  3/30/2024 9:23 AM  Attending Physician Statement  I have discussed the care of Casper Cummins, including pertinent history and exam findings,  with the resident. I have seen and examined the patient and the key elements of all parts of the encounter have been performed by me.  I agree with the  assessment, plan and orders as documented by the resident with additions .  Off pressors   K normal   Renal fx better   Ok to med surg with telemetry   Cc sign off once transferred          Total critical care time caring for this patient with life threatening, unstable organ failure, including direct patient contact, management of life support systems, review of data including imaging and labs, discussions with other team members and physicians at least 30   Min so far today, excluding procedures.            Electronically signed by ABIGAIL SANTOS MD on   3/30/24 at 7:31 PM EDT    Please note that this chart was generated using voice recognition Dragon dictation software.  Although every effort was made to ensure the accuracy of this automated transcription, some errors in transcription may have occurred.

## 2024-12-04 NOTE — PROGRESS NOTES
[facility]  INPATIENT PHYSICAL THERAPY  DAILY NOTE  [facility] MED SURG 8AB - 8B-22/022-A      Discharge Recommendations: Subacte/Skilled Nursing Facility  Equipment Recommendations:    monitor for needs             Time In: 1549  Time Out: 1609  Timed Code Treatment Minutes: 20 Minutes  Minutes: 20          Date: 2024  Patient Name: Vandana Monson,  Gender:  female        MRN: [id number]  : 1950  (74 y.o.)     Referring Practitioner: Randell Zamora PA  Diagnosis: Traumatic closed displaced fracture of distal fibula  Additional Pertinent Hx: Vandana Monson is a 74-year-old female with past medical history significant for nonobstructive CAD s/p CABG x 1, HFrEF, COPD, HLD, HTN who presented to [facility] on  after a fall.  Patient was attempting to plug in her [place] tree where she fell forward and landed on the left side of her ribs.  Patient ports increasing bilateral lower extremity weakness with increased frequency of falls.  Roughly 5 days prior to admission patient had a fall with onset of right ankle and foot swelling with ecchymosis.  Patient also medical attention at that time.  Following this most recent fall, family saw the swelling and encourage patient to come to ED for further evaluation.  X-ray of right foot showed mildly displaced oblique fracture of the distal fibula with mild soft tissue edema around lateral aspect of ankle.  Other trauma scans on arrival with CT head and CT C-spine were negative for acute process.  Patient was also noted to have elevated troponin and was started on heparin drip per ACS protocol for concern for NSTEMI.     Prior Level of Function:  Lives With: Alone  Type of Home: House  Home Layout: One level  Home Access: Ramped entrance  Home Equipment: Cane, Rollator   Bathroom Shower/Tub: Tub/Shower unit  Bathroom Toilet: Handicap height  Bathroom Equipment: Toilet raiser, Grab bars around toilet, Grab bars in shower, Shower chair    Prior Level of Assist for

## 2024-12-04 NOTE — CARE COORDINATION
12/4/24, 2:18 PM EST    DISCHARGE ON GOING EVALUATION    [facility] day: 6  Location: -22/022-A Reason for admit: General weakness [R53.1]  NSTEMI (non-ST elevated myocardial infarction) ([facility]) [I21.4]  ANI (acute kidney injury) ([facility]) [N17.9]  Traumatic closed displaced fracture of distal fibula [S82.835X]  Closed trimalleolar fracture of right ankle, initial encounter [P92.453O]     Procedures: n/a    Imaging since last note: no new     Barriers to Discharge: Medically clear for discharge per hospitalist. Await precert.     PCP: Aurelio Worrell MD  Readmission Risk Score: 15    Patient Goals/Plan/Treatment Preferences: Precert pending to [facility].

## 2024-12-04 NOTE — PROGRESS NOTES
[facility]  STRZ MED SURG 8AB  Occupational Therapy  Daily Note    Discharge Recommendations: Continue to assess pending progress, Subacute/skilled nursing facility, and Patient would benefit from continued OT at discharge  Equipment Recommendations:   Pt has a shower chair and elevated toilet.      Time In: 1110  Time Out: 1140  Timed Code Treatment Minutes: 30 Minutes  Minutes: 30          Date: 2024  Patient Name: Vandana Monson,   Gender: female      Room: [address] [place] [address]  MRN: [id number]  : 1950  (74 y.o.)  Referring Practitioner: Randell Zamora PA  Diagnosis: Traumatic closed displaced fracture of distal fibula  Additional Pertinent Hx: Vandana Monson is a 74-year-old female with past medical history significant for nonobstructive CAD s/p CABG x 1, HFrEF, COPD, HLD, HTN who presented to [facility] on  after a fall.  Patient was attempting to plug in her [employer] tree where she fell forward and landed on the left side of her ribs.  Patient ports increasing bilateral lower extremity weakness with increased frequency of falls.  Roughly 5 days prior to admission patient had a fall with onset of right ankle and foot swelling with ecchymosis.  Patient also medical attention at that time.  Following this most recent fall, family saw the swelling and encourage patient to come to ED for further evaluation.  X-ray of right foot showed mildly displaced oblique fracture of the distal fibula with mild soft tissue edema around lateral aspect of ankle.  Other trauma scans on arrival with CT head and CT C-spine were negative for acute process.  Patient was also noted to have elevated troponin and was started on heparin drip per ACS protocol for concern for NSTEMI.    Restrictions/Precautions:  Restrictions/Precautions: Fall Risk, Weight Bearing, General Precautions  Right Lower Extremity Weight Bearing: Non Weight Bearing     Social/Functional History:  Lives With: Alone  Type of Home: House  Home Layout: One

## 2024-12-05 PROCEDURE — 6370000000 HC RX 637 (ALT 250 FOR IP)

## 2024-12-05 PROCEDURE — 2580000003 HC RX 258: Performed by: INTERNAL MEDICINE

## 2024-12-05 PROCEDURE — 1200000000 HC SEMI PRIVATE

## 2024-12-05 PROCEDURE — 97535 SELF CARE MNGMENT TRAINING: CPT

## 2024-12-05 PROCEDURE — 6360000002 HC RX W HCPCS

## 2024-12-05 PROCEDURE — 6370000000 HC RX 637 (ALT 250 FOR IP): Performed by: PHYSICIAN ASSISTANT

## 2024-12-05 PROCEDURE — 97110 THERAPEUTIC EXERCISES: CPT

## 2024-12-05 PROCEDURE — 99232 SBSQ HOSP IP/OBS MODERATE 35: CPT | Performed by: PHYSICIAN ASSISTANT

## 2024-12-05 RX ADMIN — PAROXETINE HYDROCHLORIDE 20 MG: 20 TABLET, FILM COATED ORAL at 09:14

## 2024-12-05 RX ADMIN — Medication 1000 UNITS: at 09:14

## 2024-12-05 RX ADMIN — ENOXAPARIN SODIUM 40 MG: 100 INJECTION SUBCUTANEOUS at 14:09

## 2024-12-05 RX ADMIN — CYCLOBENZAPRINE 5 MG: 10 TABLET, FILM COATED ORAL at 19:29

## 2024-12-05 RX ADMIN — FUROSEMIDE 20 MG: 20 TABLET ORAL at 09:14

## 2024-12-05 RX ADMIN — LEVOTHYROXINE SODIUM 50 MCG: 0.05 TABLET ORAL at 05:28

## 2024-12-05 RX ADMIN — HYDROCODONE BITARTRATE AND ACETAMINOPHEN 2 TABLET: 5; 325 TABLET ORAL at 19:29

## 2024-12-05 RX ADMIN — Medication 6 MG: at 20:56

## 2024-12-05 RX ADMIN — SODIUM CHLORIDE, PRESERVATIVE FREE 10 ML: 5 INJECTION INTRAVENOUS at 09:16

## 2024-12-05 RX ADMIN — PANTOPRAZOLE SODIUM 40 MG: 40 TABLET, DELAYED RELEASE ORAL at 05:28

## 2024-12-05 RX ADMIN — Medication 1000 MG: at 09:14

## 2024-12-05 RX ADMIN — SPIRONOLACTONE 12.5 MG: 25 TABLET ORAL at 09:16

## 2024-12-05 RX ADMIN — ATORVASTATIN CALCIUM 80 MG: 80 TABLET, FILM COATED ORAL at 19:29

## 2024-12-05 RX ADMIN — SODIUM CHLORIDE, PRESERVATIVE FREE 10 ML: 5 INJECTION INTRAVENOUS at 19:29

## 2024-12-05 RX ADMIN — LOPERAMIDE HYDROCHLORIDE 2 MG: 2 CAPSULE ORAL at 20:56

## 2024-12-05 RX ADMIN — PANTOPRAZOLE SODIUM 40 MG: 40 TABLET, DELAYED RELEASE ORAL at 16:00

## 2024-12-05 RX ADMIN — ROPINIROLE HYDROCHLORIDE 2 MG: 1 TABLET, FILM COATED ORAL at 19:29

## 2024-12-05 RX ADMIN — CYCLOBENZAPRINE 5 MG: 10 TABLET, FILM COATED ORAL at 09:14

## 2024-12-05 RX ADMIN — METOPROLOL SUCCINATE 25 MG: 25 TABLET, FILM COATED, EXTENDED RELEASE ORAL at 09:14

## 2024-12-05 RX ADMIN — ASPIRIN 81 MG: 81 TABLET, COATED ORAL at 09:14

## 2024-12-05 RX ADMIN — HYDROCODONE BITARTRATE AND ACETAMINOPHEN 2 TABLET: 5; 325 TABLET ORAL at 09:21

## 2024-12-05 RX ADMIN — TRAZODONE HYDROCHLORIDE 200 MG: 100 TABLET ORAL at 20:56

## 2024-12-05 ASSESSMENT — PAIN SCALES - GENERAL
PAINLEVEL_OUTOF10: 6
PAINLEVEL_OUTOF10: 7
PAINLEVEL_OUTOF10: 5

## 2024-12-05 ASSESSMENT — PAIN DESCRIPTION - FREQUENCY: FREQUENCY: INTERMITTENT

## 2024-12-05 ASSESSMENT — PAIN DESCRIPTION - LOCATION
LOCATION: RIB CAGE
LOCATION: GENERALIZED

## 2024-12-05 ASSESSMENT — PAIN DESCRIPTION - DESCRIPTORS: DESCRIPTORS: ACHING

## 2024-12-05 ASSESSMENT — PAIN DESCRIPTION - ORIENTATION
ORIENTATION: LEFT
ORIENTATION: ANTERIOR

## 2024-12-05 ASSESSMENT — PAIN DESCRIPTION - PAIN TYPE: TYPE: ACUTE PAIN

## 2024-12-05 NOTE — PROGRESS NOTES
Occupational Therapy  [facility]  STRZ MED SURG 8AB  Occupational Therapy  Daily Note    Discharge Recommendations: Subacute/skilled nursing facility  Equipment Recommendations:   Pt has a shower chair and elevated toilet.    Time In: 1410  Time Out: 1433  Timed Code Treatment Minutes: 23 Minutes  Minutes: 23          Date: 2024  Patient Name: Vandana Monson,   Gender: female      Room: [address] [place] [address]  MRN: [id number]  : 1950  (74 y.o.)  Referring Practitioner: Randell Zamora PA  Diagnosis: Traumatic closed displaced fracture of distal fibula  Additional Pertinent Hx: Vandana Monson is a 74-year-old female with past medical history significant for nonobstructive CAD s/p CABG x 1, HFrEF, COPD, HLD, HTN who presented to [facility] on  after a fall.  Patient was attempting to plug in her [employer] tree where she fell forward and landed on the left side of her ribs.  Patient ports increasing bilateral lower extremity weakness with increased frequency of falls.  Roughly 5 days prior to admission patient had a fall with onset of right ankle and foot swelling with ecchymosis.  Patient also medical attention at that time.  Following this most recent fall, family saw the swelling and encourage patient to come to ED for further evaluation.  X-ray of right foot showed mildly displaced oblique fracture of the distal fibula with mild soft tissue edema around lateral aspect of ankle.  Other trauma scans on arrival with CT head and CT C-spine were negative for acute process.  Patient was also noted to have elevated troponin and was started on heparin drip per ACS protocol for concern for NSTEMI.    Restrictions/Precautions:  Restrictions/Precautions: Fall Risk, Weight Bearing, General Precautions  Right Lower Extremity Weight Bearing: Non Weight Bearing     Social/Functional History:  Lives With: Alone  Type of Home: House  Home Layout: One level  Home Access: Ramped entrance  Home Equipment: Cane, Rollator

## 2024-12-05 NOTE — PLAN OF CARE
Problem: Chronic Conditions and Co-morbidities  Goal: Patient's chronic conditions and co-morbidity symptoms are monitored and maintained or improved  12/5/2024 1013 by Kylah Marin RN  Outcome: Progressing  Flowsheets (Taken 12/5/2024 0833)  Care Plan - Patient's Chronic Conditions and Co-Morbidity Symptoms are Monitored and Maintained or Improved: Monitor and assess patient's chronic conditions and comorbid symptoms for stability, deterioration, or improvement  12/4/2024 2127 by Donald Simon RN  Outcome: Progressing  Flowsheets  Taken 12/4/2024 2000 by Donald Simon RN  Care Plan - Patient's Chronic Conditions and Co-Morbidity Symptoms are Monitored and Maintained or Improved: Monitor and assess patient's chronic conditions and comorbid symptoms for stability, deterioration, or improvement  Taken 12/4/2024 0928 by Lisa Mondragon, RN  Care Plan - Patient's Chronic Conditions and Co-Morbidity Symptoms are Monitored and Maintained or Improved: Monitor and assess patient's chronic conditions and comorbid symptoms for stability, deterioration, or improvement     Problem: Discharge Planning  Goal: Discharge to home or other facility with appropriate resources  12/5/2024 1013 by Kylah Marin RN  Outcome: Progressing  Flowsheets (Taken 12/5/2024 0833)  Discharge to home or other facility with appropriate resources: Identify barriers to discharge with patient and caregiver  12/4/2024 2127 by Donald Simon RN  Outcome: Progressing  Flowsheets  Taken 12/4/2024 2000 by Donald Simon RN  Discharge to home or other facility with appropriate resources: Identify barriers to discharge with patient and caregiver  Taken 12/4/2024 0928 by Lisa Mondragon RN  Discharge to home or other facility with appropriate resources: Identify barriers to discharge with patient and caregiver     Problem: Skin/Tissue Integrity  Goal: Absence of new skin breakdown  Description: 1.  Monitor for areas of redness and/or skin

## 2024-12-05 NOTE — PROGRESS NOTES
Hospitalist Progress Note      Patient:  Vandana Monson 74 y.o. female     : 1950  Unit/Bed:[facility]22/022-A  Date of Admission: 2024    ASSESSMENT AND PLAN    Active Problems  Takotsubo Cardiomyopathy:  New problem, found during admission.  Concern for NSTEMI given history of nonobstructive CAD.  Cardiology was consulted.  Stress test with positive, [facility]  showed positive for Takotsubo cardiomyopathy.  TTE showed reduced EF of 35 to 40%.  Cardiology signed off with outpatient follow-up 2 weeks after discharge.  Per cardiology recommendation, continue ASA, statin, beta-blocker, strict I's and O's, daily standing weights, and daily Lasix and Aldactone with hold parameters.  Hypotension- resolved  New problem, possibly due to cardiomyopathy.  BP continuing to stabilize. Continue metoprolol, aldactone, Lasix 20 mg  with hold parameters.   Midodrine PRN to allow for GDMT   Traumatic Closed Displaced Fracture of Distal Fibula:  New problem following fall.  PT OT consulted.  Ortho was consulted and recommends splint and use short posterior leg splint and also as needed sugar tong/U-splint.   Ortho signed off with patient follow-up at [facility] in 2 weeks  Continue nonweightbearing, ice, elevation.   Social work to work on SNF placement  Constipation:  Chronic problem   Continue bowel regimen  Continue MiraLAX and senna     Resolved Conditions  Leukocytosis:  Likely due to fracture, resolved now.   Pt denies symptoms.  No documented fevers  UA  negative     Chronic Conditions (reviewed and stable unless otherwise stated)  CKD III: Baseline creatinine 1.0. Monitor BMP.   HFrEF: TTE 2019 showed EF of 30-35% with grade 1 diastolic dysfunction and severe global hypokinesis of left ventricle seen. Home medication: Toprol, Aldactone, Lasix.  TTE 2024 showed EF of 35 to 40% with abnormal diastolic function. Akinesis of apex. Mild MR. Mild TR.   Nonobstructive CAD: Continue ASA 81 mg daily, Lipitor 80 mg     PARoxetine  20 mg Oral QAM    rOPINIRole  2 mg Oral Nightly    Vitamin D  1,000 Units Oral Daily    PRN Meds: HYDROcodone 5 mg - acetaminophen **OR** HYDROcodone 5 mg - acetaminophen, midodrine, polyethylene glycol, loperamide, perflutren lipid microspheres, sodium chloride flush, sodium chloride, ondansetron **OR** ondansetron, acetaminophen **OR** acetaminophen, melatonin, fluticasone, nitroGLYCERIN, traZODone    Exam:  /61   Pulse 74   Temp 98.3 °F (36.8 °C) (Oral)   Resp 16   Ht 1.549 m (5' 1\")   Wt 54.2 kg (119 lb 8 oz)   SpO2 96%   BMI 22.58 kg/m²   General: No distress, appears stated age. Nontoxic/non-ill-appearing female  Eyes:  PERRL. Conjunctivae/corneas clear.  HENT: Head normal appearing. Nares normal. Oral mucosa moist.  Hearing intact.   Neck: Supple, with full range of motion. Trachea midline.  No gross JVD appreciated.  Respiratory:  Normal effort. Clear to auscultation, without rales or wheezes or rhonchi.  Cardiovascular: Normal rate, regular rhythm with normal S1/S2 without murmurs. No lower extremity edema.   Abdomen: Soft, non-tender, non-distended with normal bowel sounds.  Musculoskeletal: No joint swelling or tenderness. Normal tone. No abnormal movements.  Fracture of right lower extremity, wrapped in brace at this time.   Bruising to right toes    Skin: Warm and dry. No rashes or lesions.   Neurologic:  No focal sensory/motor deficits in the upper or lower extremities. Cranial nerves:  grossly non-focal 2-12.     Psychiatric: Alert and oriented, normal insight and thought content.   Capillary Refill: Brisk,< 3 seconds.  Peripheral Pulses: +2 palpable, equal bilaterally    Labs/Radiology: See chart or assessment above.     Electronically signed by ORVILLE Conklin on 12/5/2024 at 5:32 PM

## 2024-12-05 NOTE — PLAN OF CARE
Problem: Chronic Conditions and Co-morbidities  Goal: Patient's chronic conditions and co-morbidity symptoms are monitored and maintained or improved  Outcome: Progressing  Flowsheets  Taken 12/4/2024 2000 by Donald Simon RN  Care Plan - Patient's Chronic Conditions and Co-Morbidity Symptoms are Monitored and Maintained or Improved: Monitor and assess patient's chronic conditions and comorbid symptoms for stability, deterioration, or improvement  Taken 12/4/2024 0928 by Lisa Mondragon RN  Care Plan - Patient's Chronic Conditions and Co-Morbidity Symptoms are Monitored and Maintained or Improved: Monitor and assess patient's chronic conditions and comorbid symptoms for stability, deterioration, or improvement     Problem: Discharge Planning  Goal: Discharge to home or other facility with appropriate resources  Outcome: Progressing  Flowsheets  Taken 12/4/2024 2000 by Donald Simon RN  Discharge to home or other facility with appropriate resources: Identify barriers to discharge with patient and caregiver  Taken 12/4/2024 0928 by Lisa Mondragon RN  Discharge to home or other facility with appropriate resources: Identify barriers to discharge with patient and caregiver     Problem: Skin/Tissue Integrity  Goal: Absence of new skin breakdown  Description: 1.  Monitor for areas of redness and/or skin breakdown  2.  Assess vascular access sites hourly  3.  Every 4-6 hours minimum:  Change oxygen saturation probe site  4.  Every 4-6 hours:  If on nasal continuous positive airway pressure, respiratory therapy assess nares and determine need for appliance change or resting period.  Outcome: Progressing     Problem: Safety - Adult  Goal: Free from fall injury  Outcome: Progressing     Problem: Pain  Goal: Verbalizes/displays adequate comfort level or baseline comfort level  Outcome: Progressing     Problem: ABCDS Injury Assessment  Goal: Absence of physical injury  Outcome: Progressing

## 2024-12-06 VITALS
SYSTOLIC BLOOD PRESSURE: 125 MMHG | BODY MASS INDEX: 22.19 KG/M2 | OXYGEN SATURATION: 96 % | HEART RATE: 77 BPM | DIASTOLIC BLOOD PRESSURE: 71 MMHG | WEIGHT: 117.5 LBS | RESPIRATION RATE: 20 BRPM | TEMPERATURE: 98.4 F | HEIGHT: 61 IN

## 2024-12-06 LAB
FLUAV RNA RESP QL NAA+PROBE: NOT DETECTED
FLUBV RNA RESP QL NAA+PROBE: NOT DETECTED
SARS-COV-2 RNA RESP QL NAA+PROBE: NOT DETECTED

## 2024-12-06 PROCEDURE — 6370000000 HC RX 637 (ALT 250 FOR IP): Performed by: PHYSICIAN ASSISTANT

## 2024-12-06 PROCEDURE — 97535 SELF CARE MNGMENT TRAINING: CPT

## 2024-12-06 PROCEDURE — 87636 SARSCOV2 & INF A&B AMP PRB: CPT

## 2024-12-06 PROCEDURE — 6370000000 HC RX 637 (ALT 250 FOR IP)

## 2024-12-06 PROCEDURE — 6360000002 HC RX W HCPCS

## 2024-12-06 PROCEDURE — 97110 THERAPEUTIC EXERCISES: CPT

## 2024-12-06 PROCEDURE — 2580000003 HC RX 258: Performed by: INTERNAL MEDICINE

## 2024-12-06 RX ORDER — LOPERAMIDE HYDROCHLORIDE 2 MG/1
2 CAPSULE ORAL 4 TIMES DAILY PRN
DISCHARGE
Start: 2024-12-06 | End: 2024-12-16

## 2024-12-06 RX ORDER — MIDODRINE HYDROCHLORIDE 2.5 MG/1
2.5 TABLET ORAL PRN
DISCHARGE
Start: 2024-12-06

## 2024-12-06 RX ORDER — FUROSEMIDE 20 MG/1
20 TABLET ORAL DAILY
DISCHARGE
Start: 2024-12-07

## 2024-12-06 RX ORDER — HYDROCODONE BITARTRATE AND ACETAMINOPHEN 5; 325 MG/1; MG/1
1 TABLET ORAL EVERY 4 HOURS PRN
Status: SHIPPED | DISCHARGE
Start: 2024-12-06 | End: 2024-12-07

## 2024-12-06 RX ADMIN — ASPIRIN 81 MG: 81 TABLET, COATED ORAL at 09:12

## 2024-12-06 RX ADMIN — ENOXAPARIN SODIUM 40 MG: 100 INJECTION SUBCUTANEOUS at 14:03

## 2024-12-06 RX ADMIN — SPIRONOLACTONE 12.5 MG: 25 TABLET ORAL at 09:13

## 2024-12-06 RX ADMIN — LEVOTHYROXINE SODIUM 50 MCG: 0.05 TABLET ORAL at 06:15

## 2024-12-06 RX ADMIN — CYCLOBENZAPRINE 5 MG: 10 TABLET, FILM COATED ORAL at 09:12

## 2024-12-06 RX ADMIN — SODIUM CHLORIDE, PRESERVATIVE FREE 10 ML: 5 INJECTION INTRAVENOUS at 09:13

## 2024-12-06 RX ADMIN — FUROSEMIDE 20 MG: 20 TABLET ORAL at 09:14

## 2024-12-06 RX ADMIN — Medication 1000 MG: at 09:12

## 2024-12-06 RX ADMIN — PAROXETINE HYDROCHLORIDE 20 MG: 20 TABLET, FILM COATED ORAL at 09:13

## 2024-12-06 RX ADMIN — HYDROCODONE BITARTRATE AND ACETAMINOPHEN 2 TABLET: 5; 325 TABLET ORAL at 09:12

## 2024-12-06 RX ADMIN — PANTOPRAZOLE SODIUM 40 MG: 40 TABLET, DELAYED RELEASE ORAL at 15:39

## 2024-12-06 RX ADMIN — HYDROCODONE BITARTRATE AND ACETAMINOPHEN 2 TABLET: 5; 325 TABLET ORAL at 17:58

## 2024-12-06 RX ADMIN — PANTOPRAZOLE SODIUM 40 MG: 40 TABLET, DELAYED RELEASE ORAL at 06:15

## 2024-12-06 RX ADMIN — Medication 1000 UNITS: at 09:13

## 2024-12-06 ASSESSMENT — PAIN - FUNCTIONAL ASSESSMENT: PAIN_FUNCTIONAL_ASSESSMENT: ACTIVITIES ARE NOT PREVENTED

## 2024-12-06 ASSESSMENT — PAIN DESCRIPTION - DESCRIPTORS: DESCRIPTORS: ACHING

## 2024-12-06 ASSESSMENT — PAIN SCALES - GENERAL
PAINLEVEL_OUTOF10: 7
PAINLEVEL_OUTOF10: 7
PAINLEVEL_OUTOF10: 4
PAINLEVEL_OUTOF10: 5

## 2024-12-06 ASSESSMENT — PAIN DESCRIPTION - LOCATION: LOCATION: GENERALIZED

## 2024-12-06 ASSESSMENT — PAIN DESCRIPTION - ORIENTATION: ORIENTATION: ANTERIOR

## 2024-12-06 ASSESSMENT — PAIN DESCRIPTION - PAIN TYPE: TYPE: ACUTE PAIN

## 2024-12-06 ASSESSMENT — PAIN SCALES - WONG BAKER
WONGBAKER_NUMERICALRESPONSE: NO HURT
WONGBAKER_NUMERICALRESPONSE: NO HURT

## 2024-12-06 NOTE — CARE COORDINATION
12/6/24, 3:00 PM EST    DISCHARGE PLANNING EVALUATION    Left message for Nellie at [facility] requested updated on precert.    3:01 PM  Nellie from [facility] left message, insurance is requesting update. Faxed updates.     3:07 PM  Nellie called, insurance approved precert. Need COVID test and result Will update with discharge date and transport.  Most likely will be tomorrow.     3:24 PM  Spoke with patient and daughter Hilda, informed of precert approval.  Hilda will transport patient at discharge. Hilda would like call when patient is ready, advised probably around 6:00pm.    12/6/24, 3:26 PM EST    Patient goals/plan/ treatment preferences discussed by  and .  Patient goals/plan/ treatment preferences reviewed with patient/ family.  Patient/ family verbalize understanding of discharge plan and are in agreement with goal/plan/treatment preferences.  Understanding was demonstrated using the teach back method.  AVS provided by RN at time of discharge, which includes all necessary medical information pertaining to the patients current course of illness, treatment, post-discharge goals of care, and treatment preferences.     Services At/After Discharge: Skilled Nursing Facility (SNF), Aide services, Nursing service, OT, and PT       Patient discharged today to [facility] skilled medicare bed.  Spoke with Nellie, aware of discharge and daughter transporting around 6:00pm.  Will fax AVS, MAR and COVID results when completed, RN aware to call report.  Daughter Hilda will transport.

## 2024-12-06 NOTE — FLOWSHEET NOTE
12/06/24 1704   AVS Reviewed   AVS & discharge instructions reviewed with patient and/or representative? Yes  (Olivia jj [facility])   Level of Understanding Questions answered;Verbalized understanding

## 2024-12-06 NOTE — PLAN OF CARE
Problem: Chronic Conditions and Co-morbidities  Goal: Patient's chronic conditions and co-morbidity symptoms are monitored and maintained or improved  12/6/2024 1031 by Kylah Marin RN  Outcome: Progressing  Flowsheets (Taken 12/6/2024 0817)  Care Plan - Patient's Chronic Conditions and Co-Morbidity Symptoms are Monitored and Maintained or Improved: Monitor and assess patient's chronic conditions and comorbid symptoms for stability, deterioration, or improvement  12/5/2024 2134 by Donald Simon RN  Outcome: Progressing  Flowsheets (Taken 12/5/2024 2000)  Care Plan - Patient's Chronic Conditions and Co-Morbidity Symptoms are Monitored and Maintained or Improved: Monitor and assess patient's chronic conditions and comorbid symptoms for stability, deterioration, or improvement     Problem: Discharge Planning  Goal: Discharge to home or other facility with appropriate resources  12/6/2024 1031 by Kylah Marin RN  Outcome: Progressing  Flowsheets (Taken 12/6/2024 0817)  Discharge to home or other facility with appropriate resources: Identify barriers to discharge with patient and caregiver  12/5/2024 2134 by Donald Simon RN  Outcome: Progressing  Flowsheets (Taken 12/5/2024 2000)  Discharge to home or other facility with appropriate resources: Identify barriers to discharge with patient and caregiver     Problem: Skin/Tissue Integrity  Goal: Absence of new skin breakdown  Description: 1.  Monitor for areas of redness and/or skin breakdown  2.  Assess vascular access sites hourly  3.  Every 4-6 hours minimum:  Change oxygen saturation probe site  4.  Every 4-6 hours:  If on nasal continuous positive airway pressure, respiratory therapy assess nares and determine need for appliance change or resting period.  12/6/2024 1031 by Kylah Marin RN  Outcome: Progressing  12/5/2024 2134 by Donald Simon RN  Outcome: Progressing     Problem: Safety - Adult  Goal: Free from fall injury  12/6/2024 1031 by  Kylah Marin RN  Outcome: Progressing  12/5/2024 2134 by Donald Simon RN  Outcome: Progressing     Problem: Pain  Goal: Verbalizes/displays adequate comfort level or baseline comfort level  12/6/2024 1031 by Kylah Marin RN  Outcome: Progressing  12/5/2024 2134 by Donald Simon RN  Outcome: Progressing     Problem: ABCDS Injury Assessment  Goal: Absence of physical injury  12/6/2024 1031 by Kylah Marin RN  Outcome: Progressing  12/5/2024 2134 by Donald Simon RN  Outcome: Progressing

## 2024-12-06 NOTE — PLAN OF CARE
Problem: Chronic Conditions and Co-morbidities  Goal: Patient's chronic conditions and co-morbidity symptoms are monitored and maintained or improved  12/5/2024 2134 by Donald Simon RN  Outcome: Progressing  Flowsheets (Taken 12/5/2024 2000)  Care Plan - Patient's Chronic Conditions and Co-Morbidity Symptoms are Monitored and Maintained or Improved: Monitor and assess patient's chronic conditions and comorbid symptoms for stability, deterioration, or improvement  12/5/2024 1013 by Kylah Marin RN  Outcome: Progressing  Flowsheets (Taken 12/5/2024 0833)  Care Plan - Patient's Chronic Conditions and Co-Morbidity Symptoms are Monitored and Maintained or Improved: Monitor and assess patient's chronic conditions and comorbid symptoms for stability, deterioration, or improvement     Problem: Discharge Planning  Goal: Discharge to home or other facility with appropriate resources  12/5/2024 2134 by Donald Simon RN  Outcome: Progressing  Flowsheets (Taken 12/5/2024 2000)  Discharge to home or other facility with appropriate resources: Identify barriers to discharge with patient and caregiver  12/5/2024 1013 by Kylah Marin RN  Outcome: Progressing  Flowsheets (Taken 12/5/2024 0833)  Discharge to home or other facility with appropriate resources: Identify barriers to discharge with patient and caregiver     Problem: Skin/Tissue Integrity  Goal: Absence of new skin breakdown  Description: 1.  Monitor for areas of redness and/or skin breakdown  2.  Assess vascular access sites hourly  3.  Every 4-6 hours minimum:  Change oxygen saturation probe site  4.  Every 4-6 hours:  If on nasal continuous positive airway pressure, respiratory therapy assess nares and determine need for appliance change or resting period.  12/5/2024 2134 by Donald Simon RN  Outcome: Progressing  12/5/2024 1013 by Kylah Marin RN  Outcome: Progressing     Problem: Safety - Adult  Goal: Free from fall injury  12/5/2024 2134 by

## 2024-12-06 NOTE — CARE COORDINATION
12/6/24, 12:38 PM EST    DISCHARGE ON GOING EVALUATION    [facility] day: 8  Location: -22/022-A Reason for admit: General weakness [R53.1]  NSTEMI (non-ST elevated myocardial infarction) ([facility]) [I21.4]  ANI (acute kidney injury) ([facility]) [N17.9]  Traumatic closed displaced fracture of distal fibula [S82.836G]  Closed trimalleolar fracture of right ankle, initial encounter [V02.140G]     Procedures: No    Imaging since last note: No    Barriers to Discharge: Medically ready for discharge. Awaiting return of precert.    PCP: Aurelio Worrell MD  Readmission Risk Score: 10.6    Patient Goals/Plan/Treatment Preferences: SW following for placement. Precert in progress for [place].

## 2024-12-06 NOTE — PROGRESS NOTES
[facility]  [facility] 8AB  Occupational Therapy  Daily Note    Discharge Recommendations: ECF with OT, 24 hour assistance or supervision, and Not safe to return home at this time  Equipment Recommendations:   Pt has a shower chair and elevated toilet.      Time In: 1418  Time Out: 1441  Timed Code Treatment Minutes: 23 Minutes  Minutes: 23          Date: 2024  Patient Name: Vandana Monson,   Gender: female      Room: [address] [place] [address]  MRN: [id number]  : 1950  (74 y.o.)  Referring Practitioner: Randell Zamora PA  Diagnosis: Traumatic closed displaced fracture of distal fibula  Additional Pertinent Hx: Vandana Monson is a 74-year-old female with past medical history significant for nonobstructive CAD s/p CABG x 1, HFrEF, COPD, HLD, HTN who presented to [facility] on  after a fall.  Patient was attempting to plug in her [place] tree where she fell forward and landed on the left side of her ribs.  Patient ports increasing bilateral lower extremity weakness with increased frequency of falls.  Roughly 5 days prior to admission patient had a fall with onset of right ankle and foot swelling with ecchymosis.  Patient also medical attention at that time.  Following this most recent fall, family saw the swelling and encourage patient to come to ED for further evaluation.  X-ray of right foot showed mildly displaced oblique fracture of the distal fibula with mild soft tissue edema around lateral aspect of ankle.  Other trauma scans on arrival with CT head and CT C-spine were negative for acute process.  Patient was also noted to have elevated troponin and was started on heparin drip per [facility] protocol for concern for NSTEMI.    Restrictions/Precautions:  Restrictions/Precautions: Fall Risk, Weight Bearing, General Precautions  Right Lower Extremity Weight Bearing: Non Weight Bearing     Social/Functional History:  Lives With: Alone  Type of Home: House  Home Layout: One level  Home Access: Ramped entrance  Home

## 2024-12-07 RX ORDER — HYDROCODONE BITARTRATE AND ACETAMINOPHEN 5; 325 MG/1; MG/1
1 TABLET ORAL EVERY 4 HOURS PRN
Qty: 6 TABLET | Refills: 0 | Status: SHIPPED | OUTPATIENT
Start: 2024-12-07 | End: 2024-12-10
